# Patient Record
Sex: FEMALE | Race: WHITE | Employment: OTHER | URBAN - METROPOLITAN AREA
[De-identification: names, ages, dates, MRNs, and addresses within clinical notes are randomized per-mention and may not be internally consistent; named-entity substitution may affect disease eponyms.]

---

## 2019-02-12 ENCOUNTER — OFFICE VISIT (OUTPATIENT)
Dept: PRIMARY CARE CLINIC | Age: 49
End: 2019-02-12
Payer: OTHER GOVERNMENT

## 2019-02-12 VITALS
BODY MASS INDEX: 44.96 KG/M2 | WEIGHT: 223 LBS | TEMPERATURE: 97.1 F | OXYGEN SATURATION: 98 % | DIASTOLIC BLOOD PRESSURE: 60 MMHG | HEIGHT: 59 IN | HEART RATE: 68 BPM | SYSTOLIC BLOOD PRESSURE: 102 MMHG

## 2019-02-12 DIAGNOSIS — F41.9 ANXIETY: ICD-10-CM

## 2019-02-12 DIAGNOSIS — E66.01 CLASS 3 SEVERE OBESITY DUE TO EXCESS CALORIES WITH SERIOUS COMORBIDITY AND BODY MASS INDEX (BMI) OF 45.0 TO 49.9 IN ADULT (HCC): ICD-10-CM

## 2019-02-12 DIAGNOSIS — Z79.4 TYPE 2 DIABETES MELLITUS WITHOUT COMPLICATION, WITH LONG-TERM CURRENT USE OF INSULIN (HCC): ICD-10-CM

## 2019-02-12 DIAGNOSIS — F33.40 RECURRENT MAJOR DEPRESSIVE DISORDER, IN REMISSION (HCC): ICD-10-CM

## 2019-02-12 DIAGNOSIS — I10 ESSENTIAL HYPERTENSION: Primary | ICD-10-CM

## 2019-02-12 DIAGNOSIS — M51.26 HERNIATED LUMBAR INTERVERTEBRAL DISC: ICD-10-CM

## 2019-02-12 DIAGNOSIS — G60.9 IDIOPATHIC PERIPHERAL NEUROPATHY: ICD-10-CM

## 2019-02-12 DIAGNOSIS — E11.9 TYPE 2 DIABETES MELLITUS WITHOUT COMPLICATION, WITH LONG-TERM CURRENT USE OF INSULIN (HCC): ICD-10-CM

## 2019-02-12 DIAGNOSIS — G47.09 OTHER INSOMNIA: ICD-10-CM

## 2019-02-12 DIAGNOSIS — Z13.220 SCREENING CHOLESTEROL LEVEL: ICD-10-CM

## 2019-02-12 PROBLEM — E66.813 CLASS 3 SEVERE OBESITY DUE TO EXCESS CALORIES WITH SERIOUS COMORBIDITY AND BODY MASS INDEX (BMI) OF 45.0 TO 49.9 IN ADULT: Status: ACTIVE | Noted: 2019-02-12

## 2019-02-12 PROCEDURE — 99203 OFFICE O/P NEW LOW 30 MIN: CPT | Performed by: INTERNAL MEDICINE

## 2019-02-12 RX ORDER — GLIMEPIRIDE 4 MG/1
4 TABLET ORAL 2 TIMES DAILY
COMMUNITY
End: 2019-09-06 | Stop reason: ALTCHOICE

## 2019-02-12 RX ORDER — ALPRAZOLAM 0.5 MG/1
0.5 TABLET ORAL NIGHTLY
COMMUNITY

## 2019-02-12 RX ORDER — DULOXETIN HYDROCHLORIDE 60 MG/1
120 CAPSULE, DELAYED RELEASE ORAL DAILY
COMMUNITY

## 2019-02-12 RX ORDER — LORATADINE 10 MG/1
10 CAPSULE, LIQUID FILLED ORAL DAILY
COMMUNITY

## 2019-02-12 RX ORDER — CETIRIZINE HYDROCHLORIDE 10 MG/1
10 TABLET ORAL DAILY
COMMUNITY
End: 2022-04-04 | Stop reason: SDUPTHER

## 2019-02-12 RX ORDER — LISINOPRIL 10 MG/1
10 TABLET ORAL DAILY
COMMUNITY
End: 2019-09-06 | Stop reason: SDUPTHER

## 2019-02-12 RX ORDER — DICYCLOMINE HCL 20 MG
20 TABLET ORAL PRN
COMMUNITY

## 2019-02-12 RX ORDER — PANTOPRAZOLE SODIUM 40 MG/1
40 TABLET, DELAYED RELEASE ORAL 2 TIMES DAILY
COMMUNITY
End: 2019-09-06 | Stop reason: SDUPTHER

## 2019-02-12 RX ORDER — ATORVASTATIN CALCIUM 10 MG/1
10 TABLET, FILM COATED ORAL DAILY
COMMUNITY
End: 2019-09-06 | Stop reason: SDUPTHER

## 2019-02-12 RX ORDER — INSULIN GLARGINE 100 [IU]/ML
56 INJECTION, SOLUTION SUBCUTANEOUS NIGHTLY
COMMUNITY
End: 2019-09-06

## 2019-02-12 RX ORDER — METOPROLOL TARTRATE 100 MG/1
100 TABLET ORAL DAILY
COMMUNITY
End: 2019-02-12 | Stop reason: SDUPTHER

## 2019-02-12 RX ORDER — METOPROLOL TARTRATE 100 MG/1
100 TABLET ORAL DAILY
Qty: 60 TABLET | Refills: 0 | Status: SHIPPED | OUTPATIENT
Start: 2019-02-12 | End: 2019-04-16 | Stop reason: ALTCHOICE

## 2019-02-12 RX ORDER — TEMAZEPAM 30 MG/1
15 CAPSULE ORAL NIGHTLY PRN
COMMUNITY
End: 2019-11-13

## 2019-02-12 RX ORDER — BUSPIRONE HYDROCHLORIDE 15 MG/1
30 TABLET ORAL 2 TIMES DAILY
COMMUNITY
End: 2022-05-05

## 2019-02-12 RX ORDER — PREGABALIN 100 MG/1
200 CAPSULE ORAL 2 TIMES DAILY
COMMUNITY

## 2019-02-12 RX ORDER — BREXPIPRAZOLE 1 MG/1
1 TABLET ORAL DAILY
COMMUNITY

## 2019-02-12 ASSESSMENT — PATIENT HEALTH QUESTIONNAIRE - PHQ9
SUM OF ALL RESPONSES TO PHQ9 QUESTIONS 1 & 2: 0
SUM OF ALL RESPONSES TO PHQ QUESTIONS 1-9: 0
SUM OF ALL RESPONSES TO PHQ QUESTIONS 1-9: 0
2. FEELING DOWN, DEPRESSED OR HOPELESS: 0
1. LITTLE INTEREST OR PLEASURE IN DOING THINGS: 0

## 2019-02-15 RX ORDER — METOPROLOL SUCCINATE 100 MG/1
100 TABLET, EXTENDED RELEASE ORAL DAILY
Qty: 90 TABLET | Refills: 1 | Status: SHIPPED | OUTPATIENT
Start: 2019-02-15 | End: 2019-09-06 | Stop reason: SDUPTHER

## 2019-03-21 RX ORDER — ONDANSETRON 4 MG/1
4 TABLET, FILM COATED ORAL EVERY 8 HOURS PRN
Qty: 15 TABLET | Refills: 0 | Status: SHIPPED | OUTPATIENT
Start: 2019-03-21 | End: 2019-04-16 | Stop reason: ALTCHOICE

## 2019-04-01 ENCOUNTER — TELEPHONE (OUTPATIENT)
Dept: PRIMARY CARE CLINIC | Age: 49
End: 2019-04-01

## 2019-04-01 DIAGNOSIS — Z79.4 TYPE 2 DIABETES MELLITUS WITHOUT COMPLICATION, WITH LONG-TERM CURRENT USE OF INSULIN (HCC): ICD-10-CM

## 2019-04-01 DIAGNOSIS — J45.909 ASTHMA, UNSPECIFIED ASTHMA SEVERITY, UNSPECIFIED WHETHER COMPLICATED, UNSPECIFIED WHETHER PERSISTENT: Primary | ICD-10-CM

## 2019-04-01 DIAGNOSIS — E11.9 TYPE 2 DIABETES MELLITUS WITHOUT COMPLICATION, WITH LONG-TERM CURRENT USE OF INSULIN (HCC): ICD-10-CM

## 2019-04-01 LAB
ALBUMIN SERPL-MCNC: 3.4 G/DL
ALP BLD-CCNC: 88 U/L
ALT SERPL-CCNC: 17 U/L
ANION GAP SERPL CALCULATED.3IONS-SCNC: ABNORMAL MMOL/L
AST SERPL-CCNC: 26 U/L
BASOPHILS ABSOLUTE: 0 /ΜL
BASOPHILS RELATIVE PERCENT: 0 %
BILIRUB SERPL-MCNC: 1.1 MG/DL (ref 0.1–1.4)
BUN BLDV-MCNC: 8 MG/DL
CALCIUM SERPL-MCNC: 8.6 MG/DL
CHLORIDE BLD-SCNC: 102 MMOL/L
CO2: 28 MMOL/L
CREAT SERPL-MCNC: 0.4 MG/DL
EOSINOPHILS ABSOLUTE: 0 /ΜL
EOSINOPHILS RELATIVE PERCENT: 0.8 %
GFR CALCULATED: 169
GLUCOSE BLD-MCNC: 138 MG/DL
HCT VFR BLD CALC: 34.1 % (ref 36–46)
HEMOGLOBIN: 11.5 G/DL (ref 12–16)
LYMPHOCYTES ABSOLUTE: 2.1 /ΜL
LYMPHOCYTES RELATIVE PERCENT: 34.5 %
MCH RBC QN AUTO: 27.3 PG
MCHC RBC AUTO-ENTMCNC: 33.6 G/DL
MCV RBC AUTO: 81.2 FL
MONOCYTES ABSOLUTE: 0.4 /ΜL
MONOCYTES RELATIVE PERCENT: 6.8 %
NEUTROPHILS ABSOLUTE: 3.5 /ΜL
NEUTROPHILS RELATIVE PERCENT: 57.9 %
PLATELET # BLD: 230 K/ΜL
PMV BLD AUTO: 7.2 FL
POTASSIUM SERPL-SCNC: 3.7 MMOL/L
RBC # BLD: 4.2 10^6/ΜL
SODIUM BLD-SCNC: 138 MMOL/L
TOTAL PROTEIN: 7.1
WBC # BLD: 6.1 10^3/ML

## 2019-04-01 RX ORDER — ALBUTEROL SULFATE 90 UG/1
2 AEROSOL, METERED RESPIRATORY (INHALATION) EVERY 6 HOURS PRN
Qty: 1 INHALER | Refills: 3 | Status: SHIPPED | OUTPATIENT
Start: 2019-04-01 | End: 2019-04-16 | Stop reason: ALTCHOICE

## 2019-04-01 NOTE — TELEPHONE ENCOUNTER
Patient was cleaning yesterday and says the dust triggered her asthma. Says her allergy pills are not helping. She is requesting a rescue inhaler be sent to [facility]

## 2019-04-09 ENCOUNTER — TELEPHONE (OUTPATIENT)
Dept: ADMINISTRATIVE | Age: 49
End: 2019-04-09

## 2019-04-09 NOTE — TELEPHONE ENCOUNTER
Pt just restarted Lyrica, and it is making her dizzy and drousy. She does not want to drive today, rescheduled appt for 4/16/19.

## 2019-04-16 ENCOUNTER — OFFICE VISIT (OUTPATIENT)
Dept: PRIMARY CARE CLINIC | Age: 49
End: 2019-04-16
Payer: OTHER GOVERNMENT

## 2019-04-16 VITALS
SYSTOLIC BLOOD PRESSURE: 138 MMHG | HEART RATE: 72 BPM | TEMPERATURE: 98 F | WEIGHT: 217 LBS | HEIGHT: 59 IN | OXYGEN SATURATION: 98 % | BODY MASS INDEX: 43.75 KG/M2 | DIASTOLIC BLOOD PRESSURE: 86 MMHG

## 2019-04-16 DIAGNOSIS — I10 ESSENTIAL HYPERTENSION: ICD-10-CM

## 2019-04-16 DIAGNOSIS — F41.9 ANXIETY: Primary | ICD-10-CM

## 2019-04-16 DIAGNOSIS — J45.909 ASTHMA, UNSPECIFIED ASTHMA SEVERITY, UNSPECIFIED WHETHER COMPLICATED, UNSPECIFIED WHETHER PERSISTENT: ICD-10-CM

## 2019-04-16 DIAGNOSIS — D64.9 NORMOCYTIC ANEMIA: ICD-10-CM

## 2019-04-16 PROCEDURE — 99214 OFFICE O/P EST MOD 30 MIN: CPT | Performed by: INTERNAL MEDICINE

## 2019-04-16 RX ORDER — CYANOCOBALAMIN 1000 UG/ML
1000 INJECTION INTRAMUSCULAR; SUBCUTANEOUS ONCE
COMMUNITY
End: 2022-05-05

## 2019-04-16 RX ORDER — HYDROXYZINE PAMOATE 25 MG/1
25 CAPSULE ORAL 3 TIMES DAILY PRN
Qty: 90 CAPSULE | Refills: 0 | Status: SHIPPED | OUTPATIENT
Start: 2019-04-16 | End: 2019-05-16

## 2019-04-16 NOTE — PATIENT INSTRUCTIONS
Patient Education        Advance Directives: Care Instructions  Your Care Instructions  An advance directive is a legal way to state your wishes at the end of your life. It tells your family and your doctor what to do if you can no longer say what you want. There are two main types of advance directives. You can change them any time that your wishes change. · A living will tells your family and your doctor your wishes about life support and other treatment. · A durable power of  for health care lets you name a person to make treatment decisions for you when you can't speak for yourself. This person is called a health care agent. If you do not have an advance directive, decisions about your medical care may be made by a doctor or a  who doesn't know you. It may help to think of an advance directive as a gift to the people who care for you. If you have one, they won't have to make tough decisions by themselves. Follow-up care is a key part of your treatment and safety. Be sure to make and go to all appointments, and call your doctor if you are having problems. It's also a good idea to know your test results and keep a list of the medicines you take. How can you care for yourself at home? · Discuss your wishes with your loved ones and your doctor. This way, there are no surprises. · Many states have a unique form. Or you might use a universal form that has been approved by many states. This kind of form can sometimes be completed and stored online. Your electronic copy will then be available wherever you have a connection to the Internet. In most cases, doctors will respect your wishes even if you have a form from a different state. · You don't need a  to do an advance directive. But you may want to get legal advice. · Think about these questions when you prepare an advance directive:  ? Who do you want to make decisions about your medical care if you are not able to?  Many people choose a family member or close friend. ? Do you know enough about life support methods that might be used? If not, talk to your doctor so you understand. ? What are you most afraid of that might happen? You might be afraid of having pain, losing your independence, or being kept alive by machines. ? Where would you prefer to die? Choices include your home, a hospital, or a nursing home. ? Would you like to have information about hospice care to support you and your family? ? Do you want to donate organs when you die? ? Do you want certain Tenriism practices performed before you die? If so, put your wishes in the advance directive. · Read your advance directive every year, and make changes as needed. When should you call for help? Be sure to contact your doctor if you have any questions. Where can you learn more? Go to [email or web address] and sign in to your [email or web address] account. Enter R264 in the Whatâ€™s More Alive Than You box to learn more about \"Advance Directives: Care Instructions. \"     If you do not have an account, please click on the \"Sign Up Now\" link. Current as of: April 18, 2018  Content Version: 11.9  © 6595-9798 [employer]. Care instructions adapted under license by [facility] ([facility]). If you have questions about a medical condition or this instruction, always ask your healthcare professional. [place] [address] any warranty or liability for your use of this information. Patient Education        Advance Directives: Care Instructions  Your Care Instructions  An advance directive is a legal way to state your wishes at the end of your life. It tells your family and your doctor what to do if you can no longer say what you want. There are two main types of advance directives. You can change them any time that your wishes change. · A living will tells your family and your doctor your wishes about life support and other treatment.   · A durable power of  for health care lets you name a person to make treatment decisions for you when you can't speak for yourself. This person is called a health care agent. If you do not have an advance directive, decisions about your medical care may be made by a doctor or a  who doesn't know you. It may help to think of an advance directive as a gift to the people who care for you. If you have one, they won't have to make tough decisions by themselves. Follow-up care is a key part of your treatment and safety. Be sure to make and go to all appointments, and call your doctor if you are having problems. It's also a good idea to know your test results and keep a list of the medicines you take. How can you care for yourself at home? · Discuss your wishes with your loved ones and your doctor. This way, there are no surprises. · Many states have a unique form. Or you might use a universal form that has been approved by many states. This kind of form can sometimes be completed and stored online. Your electronic copy will then be available wherever you have a connection to the Internet. In most cases, doctors will respect your wishes even if you have a form from a different state. · You don't need a  to do an advance directive. But you may want to get legal advice. · Think about these questions when you prepare an advance directive:  ? Who do you want to make decisions about your medical care if you are not able to? Many people choose a family member or close friend. ? Do you know enough about life support methods that might be used? If not, talk to your doctor so you understand. ? What are you most afraid of that might happen? You might be afraid of having pain, losing your independence, or being kept alive by machines. ? Where would you prefer to die? Choices include your home, a hospital, or a nursing home. ? Would you like to have information about hospice care to support you and your family?   ? Do you your treatment and safety. Be sure to make and go to all appointments, and call your doctor if you are having problems. It's also a good idea to know your test results and keep a list of the medicines you take. How can you care for yourself at home? · Discuss your wishes with your loved ones and your doctor. This way, there are no surprises. · Many states have a unique form. Or you might use a universal form that has been approved by many states. This kind of form can sometimes be completed and stored online. Your electronic copy will then be available wherever you have a connection to the Internet. In most cases, doctors will respect your wishes even if you have a form from a different state. · You don't need a  to do an advance directive. But you may want to get legal advice. · Think about these questions when you prepare an advance directive:  ? Who do you want to make decisions about your medical care if you are not able to? Many people choose a family member or close friend. ? Do you know enough about life support methods that might be used? If not, talk to your doctor so you understand. ? What are you most afraid of that might happen? You might be afraid of having pain, losing your independence, or being kept alive by machines. ? Where would you prefer to die? Choices include your home, a hospital, or a nursing home. ? Would you like to have information about hospice care to support you and your family? ? Do you want to donate organs when you die? ? Do you want certain Yazidi practices performed before you die? If so, put your wishes in the advance directive. · Read your advance directive every year, and make changes as needed. When should you call for help? Be sure to contact your doctor if you have any questions. Where can you learn more? Go to [email or web address] and sign in to your SplashCast account.  Enter R264 in the [employer] box to learn more about \"Advance Directives: Care Instructions. \"     If you do not have an account, please click on the \"Sign Up Now\" link. Current as of: April 18, 2018  Content Version: 11.9  © 0459-9907 [employer]. Care instructions adapted under license by [facility] ([facility]). If you have questions about a medical condition or this instruction, always ask your healthcare professional. [address] any warranty or liability for your use of this information.

## 2019-04-16 NOTE — PROGRESS NOTES
4/16/19    Gil Baum, a female of 46 y.o. came to the office     Gil Baum presents today for 8 week follow-up. She has a history of diabetes, peripheral neuropathy, anxiety, depression, or needed lumbar disc, asthma, autoimmune atrophic gastritis, insomnia and gastric tumor. She feels that her anxiety is getting worse. She does follow with psychiatry. She had some breathing problems - she initially thought that it was due to her asthma. She later thought that it was due to anxiety. She was admitted to [facility].  She takes cymbalta, rexulti, buspar and xanax as needed. She has not been taking xanax because it makes her sleepy because she has to take care of a young child. She would like to go to counseling but has been unable to meet with a provider. She saw her psychiatrist a few weeks ago - he started back on rexulti. She is following at [facility].  Her asthma is exacerbated by dust and other environmental exposures. She has a history of gastric cancer and follows with a surgeon at [facility]. She denies any GI bleeding or dark and tarry stools. She last had a colonoscopy 2 years ago.        Patient Active Problem List   Diagnosis    Essential hypertension    Type 2 diabetes mellitus without complication, with long-term current use of insulin ([facility])    Class 3 severe obesity due to excess calories with serious comorbidity and body mass index (BMI) of 45.0 to 49.9 in adult [facility])    Other insomnia    Anxiety    Recurrent major depressive disorder, in remission ([facility])    Idiopathic peripheral neuropathy    Herniated lumbar intervertebral disc      Allergies   Allergen Reactions    Neurontin [Gabapentin]     Theophyllines     Vicodin [Hydrocodone-Acetaminophen]     Wellbutrin [Bupropion]        Current Outpatient Medications on File Prior to Visit   Medication Sig Dispense Refill    cyanocobalamin 1000 MCG/ML injection Inject 1,000 mcg into the muscle once      Omega-3 Fatty Acids (OMEGA-3 PO) Take by mouth      metoprolol succinate (TOPROL XL) 100 MG extended release tablet Take 1 tablet by mouth daily 90 tablet 1    pantoprazole (PROTONIX) 40 MG tablet Take 40 mg by mouth 2 times daily      temazepam (RESTORIL) 30 MG capsule Take 30 mg by mouth nightly as needed for Sleep. .      glimepiride (AMARYL) 4 MG tablet Take 4 mg by mouth 2 times daily      lisinopril (PRINIVIL;ZESTRIL) 10 MG tablet Take 10 mg by mouth daily      loratadine (CLARITIN) 10 MG capsule Take 10 mg by mouth daily      cetirizine (ZYRTEC) 10 MG tablet Take 10 mg by mouth daily      atorvastatin (LIPITOR) 10 MG tablet Take 10 mg by mouth daily      pregabalin (LYRICA) 100 MG capsule Take 100 mg by mouth 3 times daily. Myra Monas insulin glargine (LANTUS) 100 UNIT/ML injection vial Inject 56 Units into the skin nightly      dicyclomine (BENTYL) 20 MG tablet Take 20 mg by mouth as needed (stomach pain)      ALPRAZolam (XANAX) 0.5 MG tablet Take 0.5 mg by mouth nightly as needed for Anxiety. Myra Monas busPIRone (BUSPAR) 15 MG tablet Take 30 mg by mouth 2 times daily      DULoxetine (CYMBALTA) 60 MG extended release capsule Take 120 mg by mouth daily      aspirin 81 MG tablet Take 81 mg by mouth daily      brexpiprazole (REXULTI) 0.5 MG TABS tablet Take 0.5 mg by mouth daily       No current facility-administered medications on file prior to visit. Review of Systems  Constitutional:Negative for activity change, appetite change, chills, fatigue and fever. Respiratory: Negative for choking, chest tightness, shortness of breath and wheezing. Cardiovascular: Negative for chest pain, palpitations and leg swelling. Gastrointestinal: Negative for abdominal distention, constipation, diarrhea, nausea and vomiting. Musculoskeletal: Negative for arthralgias, back pain, gait problem and joint swelling. Neurological: Negative for dizziness, weakness,numbness and headaches.      OBJECTIVE:  /86   Pulse 72 Temp 98 °F (36.7 °C)   Ht 4' 11\" (1.499 m)   Wt 217 lb (98.4 kg)   SpO2 98%   BMI 43.83 kg/m²      Physical Exam   Constitutional:  oriented to person, place, and time. appears well-developed and well-nourished. No distress. HENT: No sinustenderness or lymphadenopathy  Head: Normocephalic and atraumatic. Eyes: Left eye exhibits no discharge. No scleral icterus. Neck: No tracheal deviation present. No thyromegalypresent. Cardiovascular: Normal rate, regular rhythm, normal heart sounds and intact distal pulses. Exam reveals no gallop and no friction rub. No murmur heard. Pulmonary/Chest: Effort normal and breath sounds normal. No respiratory distress. She has no wheezes. no rales. no tenderness. Musculoskeletal:  no tenderness. Neurological:alert and oriented to person, place, and time. Skin: No diaphoresis. Psychiatric: Normal mood and affect. Behavior isnormal.     ASSESSMENT AND PLAN:    Dimple was seen today for diabetes, hypertension and follow-up. Diagnoses and all orders for this visit:    Anxiety  -     hydrOXYzine (VISTARIL) 25 MG capsule; Take 1 capsule by mouth 3 times daily as needed for Anxiety  -     [address], [place], PhD, Psychology,  [place] [address]    Asthma, unspecified asthma severity, unspecified whether complicated, unspecified whether persistent    Essential hypertension    Normocytic anemia        Saúl Agee presents today for two-month follow-up. Today she states that she has had multiple episodes of panic attacks. She has an extensive psychiatric history consisting of both anxiety and depression. She does follow up with a psychiatrist. She most recently saw them 3 weeks ago. Despite their changes, she continues to have acute symptoms. 2 weeks ago she was hospitalized for what she suspected was asthma. She has come to the realization that it was likely her anxiety. Today in addition to her current medications I will add on Vistaril.  At her request, I will also refer her to a psychologist for further management. We did discuss her asthma today and both her lung fields are clear. I will defer any changes to her inhaler therapy. Her blood pressure is well-controlled. We also reviewed her blood work which shows normocytic anemia. She states that previously she was diagnosed with an autoimmune gastritis and subsequent vitamin B12 deficiency. She does receive B12 injections from her neurologist. I will continue to monitor this phenomenon with blood work    Please note that the above documentation was prepared using voice recognition software. Every attempt was made to ensure accuracy but there may be spelling, grammatical, and contextual errors. Return in about 6 weeks (around 5/28/2019).     Good Crowe, DO

## 2019-04-29 ENCOUNTER — TELEPHONE (OUTPATIENT)
Dept: PRIMARY CARE CLINIC | Age: 49
End: 2019-04-29

## 2019-04-29 DIAGNOSIS — F41.9 ANXIETY: Primary | ICD-10-CM

## 2019-04-29 RX ORDER — HYDROXYZINE HYDROCHLORIDE 25 MG/1
25 TABLET, FILM COATED ORAL EVERY 6 HOURS PRN
Qty: 90 TABLET | Refills: 0 | Status: SHIPPED
Start: 2019-04-29 | End: 2020-02-17

## 2019-04-29 NOTE — TELEPHONE ENCOUNTER
Dimple has tried to  Get the vistaril 25 mg filled at a bunch of different pharmacies they are all on back order.  Do you want to call something else in

## 2019-09-06 ENCOUNTER — OFFICE VISIT (OUTPATIENT)
Dept: PRIMARY CARE CLINIC | Age: 49
End: 2019-09-06
Payer: OTHER GOVERNMENT

## 2019-09-06 VITALS
HEIGHT: 60 IN | TEMPERATURE: 98 F | HEART RATE: 87 BPM | OXYGEN SATURATION: 98 % | BODY MASS INDEX: 43.78 KG/M2 | DIASTOLIC BLOOD PRESSURE: 78 MMHG | WEIGHT: 223 LBS | SYSTOLIC BLOOD PRESSURE: 120 MMHG

## 2019-09-06 DIAGNOSIS — M54.31 BILATERAL SCIATICA: Primary | ICD-10-CM

## 2019-09-06 DIAGNOSIS — M54.32 BILATERAL SCIATICA: Primary | ICD-10-CM

## 2019-09-06 DIAGNOSIS — F33.40 RECURRENT MAJOR DEPRESSIVE DISORDER, IN REMISSION (HCC): ICD-10-CM

## 2019-09-06 DIAGNOSIS — F41.9 ANXIETY: ICD-10-CM

## 2019-09-06 DIAGNOSIS — K29.70 GASTRITIS WITHOUT BLEEDING, UNSPECIFIED CHRONICITY, UNSPECIFIED GASTRITIS TYPE: ICD-10-CM

## 2019-09-06 DIAGNOSIS — I10 ESSENTIAL HYPERTENSION: ICD-10-CM

## 2019-09-06 PROCEDURE — 99214 OFFICE O/P EST MOD 30 MIN: CPT | Performed by: INTERNAL MEDICINE

## 2019-09-06 PROCEDURE — 96372 THER/PROPH/DIAG INJ SC/IM: CPT | Performed by: INTERNAL MEDICINE

## 2019-09-06 RX ORDER — ATORVASTATIN CALCIUM 10 MG/1
10 TABLET, FILM COATED ORAL DAILY
Qty: 90 TABLET | Refills: 1 | Status: SHIPPED
Start: 2019-09-06 | End: 2020-04-29

## 2019-09-06 RX ORDER — METOPROLOL SUCCINATE 100 MG/1
100 TABLET, EXTENDED RELEASE ORAL DAILY
Qty: 90 TABLET | Refills: 1 | Status: SHIPPED
Start: 2019-09-06 | End: 2020-04-29

## 2019-09-06 RX ORDER — PANTOPRAZOLE SODIUM 40 MG/1
40 TABLET, DELAYED RELEASE ORAL 2 TIMES DAILY
Qty: 30 TABLET | Refills: 1 | Status: SHIPPED
Start: 2019-09-06 | End: 2022-04-04 | Stop reason: SDUPTHER

## 2019-09-06 RX ORDER — KETOROLAC TROMETHAMINE 30 MG/ML
30 INJECTION, SOLUTION INTRAMUSCULAR; INTRAVENOUS ONCE
Status: COMPLETED | OUTPATIENT
Start: 2019-09-06 | End: 2019-09-06

## 2019-09-06 RX ORDER — LISINOPRIL 10 MG/1
10 TABLET ORAL DAILY
Qty: 30 TABLET | Refills: 3 | Status: SHIPPED | OUTPATIENT
Start: 2019-09-06 | End: 2019-09-09 | Stop reason: SDUPTHER

## 2019-09-06 RX ORDER — NAPROXEN 500 MG/1
500 TABLET ORAL 2 TIMES DAILY WITH MEALS
Qty: 14 TABLET | Refills: 1 | Status: SHIPPED
Start: 2019-09-06 | End: 2020-03-04 | Stop reason: ALTCHOICE

## 2019-09-06 RX ADMIN — KETOROLAC TROMETHAMINE 30 MG: 30 INJECTION, SOLUTION INTRAMUSCULAR; INTRAVENOUS at 12:00

## 2019-09-06 NOTE — PROGRESS NOTES
Neurological:alert and oriented to person, place, and time. Skin: No diaphoresis. Psychiatric: Normal mood and affect. Behavior isnormal.     ASSESSMENT AND PLAN:    Dimple was seen today for lower back pain and depression. Diagnoses and all orders for this visit:    Bilateral sciatica  -     naproxen (NAPROSYN) 500 MG tablet; Take 1 tablet by mouth 2 times daily (with meals) for 14 doses  -     ketorolac (TORADOL) injection 30 mg  -     MRI LUMBAR SPINE WO CONTRAST; Future  -     Cancel: External Referral To Pain [address], Pain Medicine, [place]    Anxiety    Essential hypertension    Recurrent major depressive disorder, in remission ([facility] [address]    Gastritis without bleeding, unspecified chronicity, unspecified gastritis type    Other orders  -     metoprolol succinate (TOPROL XL) 100 MG extended release tablet; Take 1 tablet by mouth daily  -     atorvastatin (LIPITOR) 10 MG tablet; Take 1 tablet by mouth daily  -     pantoprazole (PROTONIX) 40 MG tablet; Take 1 tablet by mouth 2 times daily  -     lisinopril (PRINIVIL;ZESTRIL) 10 MG tablet; Take 1 tablet by mouth daily        Alexa Valencia presents today for routine follow-up and evaluation of her back pain. Today I will obtain an MRI. I will also give her a short course of anti-inflammatories along with a short of steroid Toradol. This is not ideal considering her history of gastritis but I am limited in therapeutic capacity given her history of diabetes as well. I will also refer her to pain management for further evaluation and more permanent treatment. Her other chronic issues are stable with the exception of her anxiety. I do feel that this is worsened given her pain symptoms.   She does follow-up with psychiatrist regularly but may benefit from titration of medication if treatment of her pain fails to improve her depression and anxiety    Please note that the above documentation was prepared using voice recognition

## 2019-09-09 RX ORDER — LISINOPRIL 10 MG/1
10 TABLET ORAL DAILY
Qty: 90 TABLET | Refills: 1 | Status: SHIPPED
Start: 2019-09-09 | End: 2022-04-05 | Stop reason: SDUPTHER

## 2019-09-12 ENCOUNTER — HOSPITAL ENCOUNTER (OUTPATIENT)
Dept: MRI IMAGING | Age: 49
Discharge: HOME | End: 2019-09-14
Payer: OTHER GOVERNMENT

## 2019-09-12 DIAGNOSIS — M54.32 BILATERAL SCIATICA: ICD-10-CM

## 2019-09-12 DIAGNOSIS — M54.31 BILATERAL SCIATICA: ICD-10-CM

## 2019-09-12 PROCEDURE — 72148 MRI LUMBAR SPINE W/O DYE: CPT

## 2019-09-16 ENCOUNTER — PREP FOR PROCEDURE (OUTPATIENT)
Dept: PAIN MANAGEMENT | Age: 49
End: 2019-09-16

## 2019-09-16 ENCOUNTER — OFFICE VISIT (OUTPATIENT)
Dept: PAIN MANAGEMENT | Age: 49
End: 2019-09-16
Payer: OTHER GOVERNMENT

## 2019-09-16 VITALS
HEART RATE: 77 BPM | TEMPERATURE: 98.1 F | SYSTOLIC BLOOD PRESSURE: 110 MMHG | HEIGHT: 60 IN | RESPIRATION RATE: 18 BRPM | BODY MASS INDEX: 43.39 KG/M2 | OXYGEN SATURATION: 97 % | DIASTOLIC BLOOD PRESSURE: 60 MMHG | WEIGHT: 221 LBS

## 2019-09-16 DIAGNOSIS — M51.9 LUMBAR DISC DISORDER: ICD-10-CM

## 2019-09-16 DIAGNOSIS — G89.4 CHRONIC PAIN SYNDROME: ICD-10-CM

## 2019-09-16 DIAGNOSIS — M16.12 PRIMARY OSTEOARTHRITIS OF LEFT HIP: ICD-10-CM

## 2019-09-16 DIAGNOSIS — M47.816 LUMBAR SPONDYLOSIS: ICD-10-CM

## 2019-09-16 DIAGNOSIS — M48.061 SPINAL STENOSIS OF LUMBAR REGION WITHOUT NEUROGENIC CLAUDICATION: ICD-10-CM

## 2019-09-16 DIAGNOSIS — M54.16 LUMBAR RADICULOPATHY: Primary | ICD-10-CM

## 2019-09-16 DIAGNOSIS — M51.26 HERNIATED LUMBAR INTERVERTEBRAL DISC: Primary | ICD-10-CM

## 2019-09-16 PROCEDURE — 99204 OFFICE O/P NEW MOD 45 MIN: CPT | Performed by: PAIN MEDICINE

## 2019-09-16 NOTE — PROGRESS NOTES
Dina Kinsey presents to the [address] [place] [address] on 9/16/2019. Dimple is complaining of pain in her low back and into her buttocks and down her legs. The pain is constant. The pain is described as aching, shooting, dull, sharp, burning, numb and unbearable. Pain is rated on her best day at a 3, on her worst day at a 10, and on average at a 5 on the VAS scale. She took her last dose of Lyrica for the neuropathy and Naproxen this morning for back pain. Any procedures since your last visit: No, with  % relief. She has been on anticoagulation medications to include ASA and is managed by PCP. Pacemaker or defibrilator: No Physician managing device is . /60   Pulse 77   Temp 98.1 °F (36.7 °C)   Resp 18   Ht 5' (1.524 m)   Wt 221 lb (100.2 kg)   LMP 01/16/2019   SpO2 97%   BMI 43.16 kg/m²      Patient's last menstrual period was 01/16/2019.
benzodiazepines, alcohol, illicit drugs or sleep aids increases the risk of respiratory depression including death. We discussed that these medications may cause drowsiness, sedation or dizziness and have counseled the patient not to drive or operate machinery. We have discussed that these medications will be prescribed only by one provider. We have discussed with the patient about age related risk factors and have thoroughly discussed the importance of taking these medications as prescribed. The patient verbalizes understanding. nikki Reina M.D.

## 2019-09-18 ENCOUNTER — APPOINTMENT (OUTPATIENT)
Dept: GENERAL RADIOLOGY | Age: 49
End: 2019-09-18
Payer: OTHER GOVERNMENT

## 2019-09-18 ENCOUNTER — HOSPITAL ENCOUNTER (EMERGENCY)
Age: 49
Discharge: HOME | End: 2019-09-18
Attending: EMERGENCY MEDICINE
Payer: OTHER GOVERNMENT

## 2019-09-18 ENCOUNTER — TELEPHONE (OUTPATIENT)
Dept: PAIN MANAGEMENT | Age: 49
End: 2019-09-18

## 2019-09-18 ENCOUNTER — APPOINTMENT (OUTPATIENT)
Dept: CT IMAGING | Age: 49
End: 2019-09-18
Payer: OTHER GOVERNMENT

## 2019-09-18 VITALS
HEIGHT: 60 IN | HEART RATE: 93 BPM | TEMPERATURE: 97.8 F | SYSTOLIC BLOOD PRESSURE: 125 MMHG | OXYGEN SATURATION: 92 % | BODY MASS INDEX: 42.21 KG/M2 | RESPIRATION RATE: 17 BRPM | WEIGHT: 215 LBS | DIASTOLIC BLOOD PRESSURE: 58 MMHG

## 2019-09-18 DIAGNOSIS — R06.00 DYSPNEA, UNSPECIFIED TYPE: Primary | ICD-10-CM

## 2019-09-18 LAB
ALBUMIN SERPL-MCNC: 3.7 G/DL (ref 3.5–5.2)
ALP BLD-CCNC: 133 U/L (ref 35–104)
ALT SERPL-CCNC: 16 U/L (ref 0–32)
ANION GAP SERPL CALCULATED.3IONS-SCNC: 11 MMOL/L (ref 7–16)
AST SERPL-CCNC: 18 U/L (ref 0–31)
BILIRUB SERPL-MCNC: 0.6 MG/DL (ref 0–1.2)
BILIRUBIN DIRECT: <0.2 MG/DL (ref 0–0.3)
BILIRUBIN, INDIRECT: ABNORMAL MG/DL (ref 0–1)
BUN BLDV-MCNC: 8 MG/DL (ref 6–20)
CALCIUM SERPL-MCNC: 8.8 MG/DL (ref 8.6–10.2)
CHLORIDE BLD-SCNC: 101 MMOL/L (ref 98–107)
CO2: 26 MMOL/L (ref 22–29)
CREAT SERPL-MCNC: 0.5 MG/DL (ref 0.5–1)
D DIMER: 368 NG/ML DDU
EKG ATRIAL RATE: 75 BPM
EKG P AXIS: 56 DEGREES
EKG P-R INTERVAL: 166 MS
EKG Q-T INTERVAL: 408 MS
EKG QRS DURATION: 80 MS
EKG QTC CALCULATION (BAZETT): 455 MS
EKG R AXIS: 17 DEGREES
EKG T AXIS: 35 DEGREES
EKG VENTRICULAR RATE: 75 BPM
GFR AFRICAN AMERICAN: >60
GFR NON-AFRICAN AMERICAN: >60 ML/MIN/1.73
GLUCOSE BLD-MCNC: 391 MG/DL (ref 74–99)
LIPASE: 22 U/L (ref 13–60)
POTASSIUM REFLEX MAGNESIUM: 4 MMOL/L (ref 3.5–5)
PRO-BNP: 310 PG/ML (ref 0–125)
SODIUM BLD-SCNC: 138 MMOL/L (ref 132–146)
TOTAL PROTEIN: 6.8 G/DL (ref 6.4–8.3)
TROPONIN: <0.01 NG/ML (ref 0–0.03)

## 2019-09-18 PROCEDURE — 94664 DEMO&/EVAL PT USE INHALER: CPT

## 2019-09-18 PROCEDURE — 71275 CT ANGIOGRAPHY CHEST: CPT

## 2019-09-18 PROCEDURE — 83690 ASSAY OF LIPASE: CPT

## 2019-09-18 PROCEDURE — 83880 ASSAY OF NATRIURETIC PEPTIDE: CPT

## 2019-09-18 PROCEDURE — 93010 ELECTROCARDIOGRAM REPORT: CPT | Performed by: INTERNAL MEDICINE

## 2019-09-18 PROCEDURE — 85378 FIBRIN DEGRADE SEMIQUANT: CPT

## 2019-09-18 PROCEDURE — 71045 X-RAY EXAM CHEST 1 VIEW: CPT

## 2019-09-18 PROCEDURE — 96375 TX/PRO/DX INJ NEW DRUG ADDON: CPT

## 2019-09-18 PROCEDURE — 99285 EMERGENCY DEPT VISIT HI MDM: CPT

## 2019-09-18 PROCEDURE — 80048 BASIC METABOLIC PNL TOTAL CA: CPT

## 2019-09-18 PROCEDURE — 6360000002 HC RX W HCPCS: Performed by: STUDENT IN AN ORGANIZED HEALTH CARE EDUCATION/TRAINING PROGRAM

## 2019-09-18 PROCEDURE — 80076 HEPATIC FUNCTION PANEL: CPT

## 2019-09-18 PROCEDURE — 2580000003 HC RX 258: Performed by: RADIOLOGY

## 2019-09-18 PROCEDURE — 73030 X-RAY EXAM OF SHOULDER: CPT

## 2019-09-18 PROCEDURE — 96374 THER/PROPH/DIAG INJ IV PUSH: CPT

## 2019-09-18 PROCEDURE — 94640 AIRWAY INHALATION TREATMENT: CPT

## 2019-09-18 PROCEDURE — 6370000000 HC RX 637 (ALT 250 FOR IP): Performed by: EMERGENCY MEDICINE

## 2019-09-18 PROCEDURE — 84484 ASSAY OF TROPONIN QUANT: CPT

## 2019-09-18 PROCEDURE — 93005 ELECTROCARDIOGRAM TRACING: CPT | Performed by: STUDENT IN AN ORGANIZED HEALTH CARE EDUCATION/TRAINING PROGRAM

## 2019-09-18 PROCEDURE — 6360000004 HC RX CONTRAST MEDICATION: Performed by: RADIOLOGY

## 2019-09-18 PROCEDURE — 36415 COLL VENOUS BLD VENIPUNCTURE: CPT

## 2019-09-18 RX ORDER — SODIUM CHLORIDE 0.9 % (FLUSH) 0.9 %
10 SYRINGE (ML) INJECTION PRN
Status: COMPLETED | OUTPATIENT
Start: 2019-09-18 | End: 2019-09-18

## 2019-09-18 RX ORDER — FENTANYL CITRATE 50 UG/ML
50 INJECTION, SOLUTION INTRAMUSCULAR; INTRAVENOUS ONCE
Status: COMPLETED | OUTPATIENT
Start: 2019-09-18 | End: 2019-09-18

## 2019-09-18 RX ORDER — KETOROLAC TROMETHAMINE 30 MG/ML
15 INJECTION, SOLUTION INTRAMUSCULAR; INTRAVENOUS ONCE
Status: COMPLETED | OUTPATIENT
Start: 2019-09-18 | End: 2019-09-18

## 2019-09-18 RX ORDER — IPRATROPIUM BROMIDE AND ALBUTEROL SULFATE 2.5; .5 MG/3ML; MG/3ML
1 SOLUTION RESPIRATORY (INHALATION)
Status: COMPLETED | OUTPATIENT
Start: 2019-09-18 | End: 2019-09-18

## 2019-09-18 RX ORDER — ACETAMINOPHEN 325 MG/1
650 TABLET ORAL EVERY 6 HOURS PRN
COMMUNITY
End: 2020-03-04 | Stop reason: ALTCHOICE

## 2019-09-18 RX ADMIN — IPRATROPIUM BROMIDE AND ALBUTEROL SULFATE 1 AMPULE: .5; 3 SOLUTION RESPIRATORY (INHALATION) at 14:51

## 2019-09-18 RX ADMIN — KETOROLAC TROMETHAMINE 15 MG: 30 INJECTION, SOLUTION INTRAMUSCULAR; INTRAVENOUS at 12:48

## 2019-09-18 RX ADMIN — Medication 10 ML: at 15:57

## 2019-09-18 RX ADMIN — FENTANYL CITRATE 50 MCG: 50 INJECTION INTRAMUSCULAR; INTRAVENOUS at 14:43

## 2019-09-18 RX ADMIN — IPRATROPIUM BROMIDE AND ALBUTEROL SULFATE 1 AMPULE: .5; 3 SOLUTION RESPIRATORY (INHALATION) at 14:55

## 2019-09-18 RX ADMIN — IOPAMIDOL 80 ML: 755 INJECTION, SOLUTION INTRAVENOUS at 16:00

## 2019-09-18 RX ADMIN — IPRATROPIUM BROMIDE AND ALBUTEROL SULFATE 1 AMPULE: .5; 3 SOLUTION RESPIRATORY (INHALATION) at 14:50

## 2019-09-18 ASSESSMENT — ENCOUNTER SYMPTOMS
GASTROINTESTINAL NEGATIVE: 1
SORE THROAT: 0
CHEST TIGHTNESS: 1
WHEEZING: 0
EYES NEGATIVE: 1
STRIDOR: 1
SHORTNESS OF BREATH: 1
COUGH: 0
HEMOPTYSIS: 0

## 2019-09-18 ASSESSMENT — PAIN SCALES - GENERAL
PAINLEVEL_OUTOF10: 8
PAINLEVEL_OUTOF10: 8
PAINLEVEL_OUTOF10: 10

## 2019-09-18 NOTE — ED PROVIDER NOTES
Normal range of motion. Neck supple. Cardiovascular: Normal rate, regular rhythm and normal heart sounds. No murmurs rubs gallops appreciated. Chest pain is reproducible with palpation. Pulmonary/Chest: Effort normal and breath sounds normal.   No rales rhonchi's or wheezes appreciated. No rashes. Abdominal: Soft. Bowel sounds are normal.   Musculoskeletal: Normal range of motion. Neurological: She is alert and oriented to person, place, and time. Skin: Skin is warm and dry. Procedures         --------------------------------------------- PAST HISTORY ---------------------------------------------  Past Medical History:  has a past medical history of Asthma, [facility] mellitus[facility] GERD (gastroesophageal reflux disease), Hypertension, IBS (irritable bowel syndrome), OCD (obsessive compulsive disorder), Sleep apnea, and Stomach cancer ([facility] Past Surgical History:  has a past surgical history that includes Upper gastrointestinal endoscopy and Colonoscopy. Social History:  reports that she has never smoked. She has never used smokeless tobacco. She reports that she does not drink alcohol or use drugs. Family History: family history includes Cancer in an other family member; Depression in an other family member; Diabetes in an other family member; Heart Disease in an other family member; Mental Illness in an other family member; Stroke in an other family member. The patients home medications have been reviewed. Allergies: Neurontin [gabapentin];  Theophyllines; Vicodin [hydrocodone-acetaminophen]; and Wellbutrin [bupropion]    -------------------------------------------------- RESULTS -------------------------------------------------    Lab  Results for orders placed or performed during the hospital encounter of 84/77/63   Basic Metabolic Panel w/ Reflex to MG   Result Value Ref Range    Sodium 138 132 - 146 mmol/L    Potassium reflex Magnesium 4.0 3.5 - 5.0 mmol/L    Chloride family member; Depression in an other family member; Diabetes in an other family member; Heart Disease in an other family member; Mental Illness in an other family member; Stroke in an other family member. The patients home medications have been reviewed. Allergies: Neurontin [gabapentin];  Theophyllines; Vicodin [hydrocodone-acetaminophen]; and Wellbutrin [bupropion]    -------------------------------------------------- RESULTS -------------------------------------------------  Labs:  Results for orders placed or performed during the hospital encounter of 32/77/16   Basic Metabolic Panel w/ Reflex to MG   Result Value Ref Range    Sodium 138 132 - 146 mmol/L    Potassium reflex Magnesium 4.0 3.5 - 5.0 mmol/L    Chloride 101 98 - 107 mmol/L    CO2 26 22 - 29 mmol/L    Anion Gap 11 7 - 16 mmol/L    Glucose 391 (H) 74 - 99 mg/dL    BUN 8 6 - 20 mg/dL    CREATININE 0.5 0.5 - 1.0 mg/dL    GFR Non-African American >60 >=60 mL/min/1.73    GFR African American >60     Calcium 8.8 8.6 - 10.2 mg/dL   Brain Natriuretic Peptide   Result Value Ref Range    Pro- (H) 0 - 125 pg/mL   Troponin   Result Value Ref Range    Troponin <0.01 0.00 - 0.03 ng/mL   D-Dimer, Quantitative   Result Value Ref Range    D-Dimer, Quant 368 ng/mL DDU   Hepatic Function Panel   Result Value Ref Range    Total Protein 6.8 6.4 - 8.3 g/dL    Alb 3.7 3.5 - 5.2 g/dL    Alkaline Phosphatase 133 (H) 35 - 104 U/L    ALT 16 0 - 32 U/L    AST 18 0 - 31 U/L    Total Bilirubin 0.6 0.0 - 1.2 mg/dL    Bilirubin, Direct <0.2 0.0 - 0.3 mg/dL    Bilirubin, Indirect see below 0.0 - 1.0 mg/dL   Lipase   Result Value Ref Range    Lipase 22 13 - 60 U/L   EKG 12 Lead   Result Value Ref Range    Ventricular Rate 75 BPM    Atrial Rate 75 BPM    P-R Interval 166 ms    QRS Duration 80 ms    Q-T Interval 408 ms    QTc Calculation (Bazett) 455 ms    P Axis 56 degrees    R Axis 17 degrees    T Axis 35 degrees       Radiology:  CTA CHEST W CONTRAST   Final Result Diagnosis:  1. Dyspnea, unspecified type        Disposition:  Patient's disposition: Discharge to home  Patient's condition is stable.        Gracie Esparza MD  Resident  09/18/19 [id number]

## 2019-09-20 ENCOUNTER — OFFICE VISIT (OUTPATIENT)
Dept: PRIMARY CARE CLINIC | Age: 49
End: 2019-09-20
Payer: OTHER GOVERNMENT

## 2019-09-20 VITALS
SYSTOLIC BLOOD PRESSURE: 98 MMHG | OXYGEN SATURATION: 96 % | BODY MASS INDEX: 46.43 KG/M2 | HEIGHT: 60 IN | TEMPERATURE: 97.8 F | HEART RATE: 74 BPM | WEIGHT: 236.5 LBS | DIASTOLIC BLOOD PRESSURE: 64 MMHG

## 2019-09-20 DIAGNOSIS — I10 ESSENTIAL HYPERTENSION: ICD-10-CM

## 2019-09-20 DIAGNOSIS — F41.9 ANXIETY: Primary | ICD-10-CM

## 2019-09-20 DIAGNOSIS — K76.0 FATTY LIVER DISEASE, NONALCOHOLIC: ICD-10-CM

## 2019-09-20 DIAGNOSIS — R06.09 DYSPNEA ON EXERTION: ICD-10-CM

## 2019-09-20 PROCEDURE — 99213 OFFICE O/P EST LOW 20 MIN: CPT | Performed by: INTERNAL MEDICINE

## 2019-09-20 NOTE — PROGRESS NOTES
9/20/19    Agustin Stafford, a female of 44 y.o. came to the office     Agustin Stafford presents today for ER follow up. She has chest pressure and shortness of breath. She was told that she has an enlarged heart, spleen and liver. Her chest issues has gotten better. She has been taking her anxiety medication for the past 2 days. This has been helping her breathing. She also has been using her CPAP that has been helping. She has been to pain management--- they recommended epidural injections. They discussed the possibility of doing surgery if the injections don't help.      Patient Active Problem List   Diagnosis    Essential hypertension    Type 2 diabetes mellitus without complication, with long-term current use of insulin ([facility]    Class 3 severe obesity due to excess calories with serious comorbidity and body mass index (BMI) of 45.0 to 49.9 in [facility])    Other insomnia    Anxiety    Recurrent major depressive disorder, in remission ([facility]    Idiopathic peripheral neuropathy    Herniated lumbar intervertebral disc    Lumbar spondylosis    Lumbar disc disorder    Lumbar radiculopathy    Chronic pain syndrome    Spinal stenosis of lumbar region without neurogenic claudication    Primary osteoarthritis of left hip      Allergies   Allergen Reactions    Neurontin [Gabapentin]     Theophyllines     Vicodin [Hydrocodone-Acetaminophen]     Wellbutrin [Bupropion]      Current Outpatient Medications on File Prior to Visit   Medication Sig Dispense Refill    acetaminophen (TYLENOL) 325 MG tablet Take 650 mg by mouth every 6 hours as needed for Pain      lisinopril (PRINIVIL;ZESTRIL) 10 MG tablet Take 1 tablet by mouth daily 90 tablet 1    insulin regular human (HUMULIN R U-500 KWIKPEN) 500 UNIT/ML SOPN concentrated injection pen Inject into the skin      metoprolol succinate (TOPROL XL) 100 MG extended release tablet Take 1 tablet by mouth daily 90 tablet 1    atorvastatin (LIPITOR) 10 MG tablet and well-nourished. No distress. HENT: No sinustenderness or lymphadenopathy  Head: Normocephalic and atraumatic. Eyes: Left eye exhibits no discharge. No scleral icterus. Neck: No tracheal deviation present. No thyromegalypresent. Cardiovascular: Normal rate, regular rhythm, normal heart sounds and intact distal pulses. Exam reveals no gallop and no friction rub. No murmur heard./Chest: Effort normal and breath sounds normal. No respiratory distress. She has no wheezes. no rales. no tenderness. Musculoskeletal:  no tenderness. Neurological:alert and oriented to person, place, and time. Skin: No diaphoresis. Psychiatric: Normal mood and affect. Behavior isnormal.     ASSESSMENT AND PLAN:    Dimple was seen today for follow-up from hospital.    Diagnoses and all orders for this visit:    Anxiety    Essential hypertension    Dyspnea on exertion  -     ECHO Complete 2D W Doppler W Color; Future    Fatty liver disease, nonalcoholic  -     US LIVER; Future        Deana Kidd presents today for ER follow up    Echocardiogram to evaluate for cardiomegaly, swelling and dyspnea on exertion   CTA of the chest revealed cirrhosis  Will obtain US to characterize the liver better  Hx of alcohol use and fatty liver   Following with pain management   Going to have epidural  Consider referral to neurosurgery   Anxiety well controlled     Please note that the above documentation was prepared using voice recognition software. Every attempt was made to ensure accuracy but there may be spelling, grammatical, and contextual errors. Electronically signed by Katie Huizar DO on 9/20/2019 at 10:44 AM        No follow-ups on file.     Katie Huizar DO

## 2019-09-30 ENCOUNTER — TELEPHONE (OUTPATIENT)
Dept: PRIMARY CARE CLINIC | Age: 49
End: 2019-09-30

## 2019-09-30 DIAGNOSIS — M54.31 BILATERAL SCIATICA: Primary | ICD-10-CM

## 2019-09-30 DIAGNOSIS — M54.32 BILATERAL SCIATICA: Primary | ICD-10-CM

## 2019-10-02 ENCOUNTER — HOSPITAL ENCOUNTER (OUTPATIENT)
Dept: ULTRASOUND IMAGING | Age: 49
Discharge: HOME | End: 2019-10-04
Payer: OTHER GOVERNMENT

## 2019-10-02 ENCOUNTER — TELEPHONE (OUTPATIENT)
Dept: PRIMARY CARE CLINIC | Age: 49
End: 2019-10-02

## 2019-10-02 ENCOUNTER — ANESTHESIA EVENT (OUTPATIENT)
Dept: OPERATING ROOM | Age: 49
End: 2019-10-02
Payer: OTHER GOVERNMENT

## 2019-10-02 DIAGNOSIS — K76.0 FATTY LIVER DISEASE, NONALCOHOLIC: ICD-10-CM

## 2019-10-02 PROCEDURE — 76705 ECHO EXAM OF ABDOMEN: CPT

## 2019-10-03 ENCOUNTER — HOSPITAL ENCOUNTER (OUTPATIENT)
Age: 49
Setting detail: OUTPATIENT SURGERY
Discharge: HOME | End: 2019-10-03
Attending: PAIN MEDICINE | Admitting: PAIN MEDICINE
Payer: OTHER GOVERNMENT

## 2019-10-03 ENCOUNTER — ANESTHESIA (OUTPATIENT)
Dept: OPERATING ROOM | Age: 49
End: 2019-10-03
Payer: OTHER GOVERNMENT

## 2019-10-03 VITALS
DIASTOLIC BLOOD PRESSURE: 89 MMHG | OXYGEN SATURATION: 99 % | SYSTOLIC BLOOD PRESSURE: 129 MMHG | RESPIRATION RATE: 16 BRPM

## 2019-10-03 VITALS
RESPIRATION RATE: 14 BRPM | HEART RATE: 89 BPM | HEIGHT: 60 IN | SYSTOLIC BLOOD PRESSURE: 173 MMHG | TEMPERATURE: 98 F | OXYGEN SATURATION: 95 % | BODY MASS INDEX: 42.8 KG/M2 | DIASTOLIC BLOOD PRESSURE: 91 MMHG | WEIGHT: 218 LBS

## 2019-10-03 DIAGNOSIS — M51.9 LUMBAR DISC DISEASE: ICD-10-CM

## 2019-10-03 DIAGNOSIS — M54.32 BILATERAL SCIATICA: Primary | ICD-10-CM

## 2019-10-03 DIAGNOSIS — M54.31 BILATERAL SCIATICA: Primary | ICD-10-CM

## 2019-10-03 LAB — METER GLUCOSE: 161 MG/DL (ref 74–99)

## 2019-10-03 PROCEDURE — 3700000001 HC ADD 15 MINUTES (ANESTHESIA): Performed by: PAIN MEDICINE

## 2019-10-03 PROCEDURE — 82962 GLUCOSE BLOOD TEST: CPT

## 2019-10-03 PROCEDURE — 6360000004 HC RX CONTRAST MEDICATION: Performed by: PAIN MEDICINE

## 2019-10-03 PROCEDURE — 2500000003 HC RX 250 WO HCPCS: Performed by: PAIN MEDICINE

## 2019-10-03 PROCEDURE — 3600000005 HC SURGERY LEVEL 5 BASE: Performed by: PAIN MEDICINE

## 2019-10-03 PROCEDURE — 2580000003 HC RX 258: Performed by: ANESTHESIOLOGY

## 2019-10-03 PROCEDURE — 2500000003 HC RX 250 WO HCPCS: Performed by: NURSE ANESTHETIST, CERTIFIED REGISTERED

## 2019-10-03 PROCEDURE — 3700000000 HC ANESTHESIA ATTENDED CARE: Performed by: PAIN MEDICINE

## 2019-10-03 PROCEDURE — 6360000002 HC RX W HCPCS: Performed by: PAIN MEDICINE

## 2019-10-03 PROCEDURE — 7100000011 HC PHASE II RECOVERY - ADDTL 15 MIN: Performed by: PAIN MEDICINE

## 2019-10-03 PROCEDURE — 3600000015 HC SURGERY LEVEL 5 ADDTL 15MIN: Performed by: PAIN MEDICINE

## 2019-10-03 PROCEDURE — 7100000010 HC PHASE II RECOVERY - FIRST 15 MIN: Performed by: PAIN MEDICINE

## 2019-10-03 PROCEDURE — 6360000002 HC RX W HCPCS: Performed by: NURSE ANESTHETIST, CERTIFIED REGISTERED

## 2019-10-03 PROCEDURE — 2709999900 HC NON-CHARGEABLE SUPPLY: Performed by: PAIN MEDICINE

## 2019-10-03 PROCEDURE — 64483 NJX AA&/STRD TFRM EPI L/S 1: CPT | Performed by: PAIN MEDICINE

## 2019-10-03 RX ORDER — LIDOCAINE HYDROCHLORIDE 5 MG/ML
INJECTION, SOLUTION INFILTRATION; INTRAVENOUS PRN
Status: DISCONTINUED | OUTPATIENT
Start: 2019-10-03 | End: 2019-10-03 | Stop reason: ALTCHOICE

## 2019-10-03 RX ORDER — MIDAZOLAM HYDROCHLORIDE 1 MG/ML
INJECTION INTRAMUSCULAR; INTRAVENOUS PRN
Status: DISCONTINUED | OUTPATIENT
Start: 2019-10-03 | End: 2019-10-03 | Stop reason: SDUPTHER

## 2019-10-03 RX ORDER — PROPOFOL 10 MG/ML
INJECTION, EMULSION INTRAVENOUS PRN
Status: DISCONTINUED | OUTPATIENT
Start: 2019-10-03 | End: 2019-10-03 | Stop reason: SDUPTHER

## 2019-10-03 RX ORDER — HYDRALAZINE HYDROCHLORIDE 20 MG/ML
5 INJECTION INTRAMUSCULAR; INTRAVENOUS EVERY 10 MIN PRN
Status: DISCONTINUED | OUTPATIENT
Start: 2019-10-03 | End: 2019-10-03 | Stop reason: HOSPADM

## 2019-10-03 RX ORDER — MEPERIDINE HYDROCHLORIDE 50 MG/ML
12.5 INJECTION INTRAMUSCULAR; INTRAVENOUS; SUBCUTANEOUS EVERY 5 MIN PRN
Status: DISCONTINUED | OUTPATIENT
Start: 2019-10-03 | End: 2019-10-03 | Stop reason: HOSPADM

## 2019-10-03 RX ORDER — PROMETHAZINE HYDROCHLORIDE 25 MG/ML
25 INJECTION, SOLUTION INTRAMUSCULAR; INTRAVENOUS
Status: DISCONTINUED | OUTPATIENT
Start: 2019-10-03 | End: 2019-10-03 | Stop reason: HOSPADM

## 2019-10-03 RX ORDER — LABETALOL 20 MG/4 ML (5 MG/ML) INTRAVENOUS SYRINGE
5 EVERY 10 MIN PRN
Status: DISCONTINUED | OUTPATIENT
Start: 2019-10-03 | End: 2019-10-03 | Stop reason: HOSPADM

## 2019-10-03 RX ORDER — DIPHENHYDRAMINE HYDROCHLORIDE 50 MG/ML
12.5 INJECTION INTRAMUSCULAR; INTRAVENOUS
Status: DISCONTINUED | OUTPATIENT
Start: 2019-10-03 | End: 2019-10-03 | Stop reason: HOSPADM

## 2019-10-03 RX ORDER — SODIUM CHLORIDE, SODIUM LACTATE, POTASSIUM CHLORIDE, CALCIUM CHLORIDE 600; 310; 30; 20 MG/100ML; MG/100ML; MG/100ML; MG/100ML
INJECTION, SOLUTION INTRAVENOUS CONTINUOUS
Status: DISCONTINUED | OUTPATIENT
Start: 2019-10-03 | End: 2019-10-03 | Stop reason: HOSPADM

## 2019-10-03 RX ORDER — ALFENTANIL HYDROCHLORIDE 500 UG/ML
INJECTION INTRAVENOUS PRN
Status: DISCONTINUED | OUTPATIENT
Start: 2019-10-03 | End: 2019-10-03 | Stop reason: SDUPTHER

## 2019-10-03 RX ADMIN — ALFENTANIL HYDROCHLORIDE 250 MCG: 500 INJECTION INTRAVENOUS at 14:27

## 2019-10-03 RX ADMIN — PROPOFOL 30 MG: 10 INJECTION, EMULSION INTRAVENOUS at 14:20

## 2019-10-03 RX ADMIN — ALFENTANIL HYDROCHLORIDE 250 MCG: 500 INJECTION INTRAVENOUS at 14:22

## 2019-10-03 RX ADMIN — SODIUM CHLORIDE, POTASSIUM CHLORIDE, SODIUM LACTATE AND CALCIUM CHLORIDE: 600; 310; 30; 20 INJECTION, SOLUTION INTRAVENOUS at 13:11

## 2019-10-03 RX ADMIN — ALFENTANIL HYDROCHLORIDE 250 MCG: 500 INJECTION INTRAVENOUS at 14:20

## 2019-10-03 RX ADMIN — MIDAZOLAM 2 MG: 1 INJECTION INTRAMUSCULAR; INTRAVENOUS at 14:18

## 2019-10-03 RX ADMIN — PROPOFOL 10 MG: 10 INJECTION, EMULSION INTRAVENOUS at 14:22

## 2019-10-03 RX ADMIN — ALFENTANIL HYDROCHLORIDE 250 MCG: 500 INJECTION INTRAVENOUS at 14:31

## 2019-10-03 ASSESSMENT — PAIN - FUNCTIONAL ASSESSMENT: PAIN_FUNCTIONAL_ASSESSMENT: 0-10

## 2019-10-03 ASSESSMENT — PAIN SCALES - GENERAL
PAINLEVEL_OUTOF10: 0

## 2019-10-03 ASSESSMENT — PAIN DESCRIPTION - DESCRIPTORS: DESCRIPTORS: ACHING;DISCOMFORT

## 2019-10-11 ENCOUNTER — TELEPHONE (OUTPATIENT)
Dept: CARDIOLOGY | Age: 49
End: 2019-10-11

## 2019-10-16 ENCOUNTER — TELEPHONE (OUTPATIENT)
Dept: ADMINISTRATIVE | Age: 49
End: 2019-10-16

## 2019-10-18 ENCOUNTER — OFFICE VISIT (OUTPATIENT)
Dept: PAIN MANAGEMENT | Age: 49
End: 2019-10-18
Payer: OTHER GOVERNMENT

## 2019-10-18 ENCOUNTER — PREP FOR PROCEDURE (OUTPATIENT)
Dept: PAIN MANAGEMENT | Age: 49
End: 2019-10-18

## 2019-10-18 VITALS
WEIGHT: 215 LBS | HEIGHT: 60 IN | RESPIRATION RATE: 16 BRPM | TEMPERATURE: 98.7 F | OXYGEN SATURATION: 95 % | DIASTOLIC BLOOD PRESSURE: 72 MMHG | BODY MASS INDEX: 42.21 KG/M2 | SYSTOLIC BLOOD PRESSURE: 118 MMHG | HEART RATE: 85 BPM

## 2019-10-18 DIAGNOSIS — M51.9 LUMBAR DISC DISORDER: ICD-10-CM

## 2019-10-18 DIAGNOSIS — M48.061 SPINAL STENOSIS OF LUMBAR REGION WITHOUT NEUROGENIC CLAUDICATION: ICD-10-CM

## 2019-10-18 DIAGNOSIS — M47.816 LUMBAR SPONDYLOSIS: ICD-10-CM

## 2019-10-18 DIAGNOSIS — M51.26 HERNIATED LUMBAR INTERVERTEBRAL DISC: Primary | ICD-10-CM

## 2019-10-18 DIAGNOSIS — M54.16 LUMBAR RADICULOPATHY: Primary | ICD-10-CM

## 2019-10-18 DIAGNOSIS — G89.4 CHRONIC PAIN SYNDROME: ICD-10-CM

## 2019-10-18 DIAGNOSIS — M54.16 LUMBAR RADICULOPATHY: ICD-10-CM

## 2019-10-18 PROCEDURE — 99214 OFFICE O/P EST MOD 30 MIN: CPT | Performed by: PAIN MEDICINE

## 2019-10-18 PROCEDURE — 99213 OFFICE O/P EST LOW 20 MIN: CPT | Performed by: PAIN MEDICINE

## 2019-11-01 ENCOUNTER — TELEPHONE (OUTPATIENT)
Dept: CARDIOLOGY | Age: 49
End: 2019-11-01

## 2019-11-06 ENCOUNTER — HOSPITAL ENCOUNTER (OUTPATIENT)
Dept: CARDIOLOGY | Age: 49
Discharge: HOME | End: 2019-11-06
Payer: OTHER GOVERNMENT

## 2019-11-06 DIAGNOSIS — R06.09 DYSPNEA ON EXERTION: ICD-10-CM

## 2019-11-06 LAB
LV EF: 55 %
LVEF MODALITY: NORMAL

## 2019-11-06 PROCEDURE — 2580000003 HC RX 258: Performed by: INTERNAL MEDICINE

## 2019-11-06 PROCEDURE — 93306 TTE W/DOPPLER COMPLETE: CPT

## 2019-11-06 PROCEDURE — 6360000004 HC RX CONTRAST MEDICATION: Performed by: INTERNAL MEDICINE

## 2019-11-06 RX ORDER — SODIUM CHLORIDE 0.9 % (FLUSH) 0.9 %
10 SYRINGE (ML) INJECTION PRN
Status: DISCONTINUED | OUTPATIENT
Start: 2019-11-06 | End: 2019-11-07 | Stop reason: HOSPADM

## 2019-11-06 RX ADMIN — Medication 10 ML: at 14:05

## 2019-11-06 RX ADMIN — Medication 10 ML: at 14:10

## 2019-11-06 RX ADMIN — PERFLUTREN 1.1 MG: 6.52 INJECTION, SUSPENSION INTRAVENOUS at 14:05

## 2019-11-13 ENCOUNTER — OFFICE VISIT (OUTPATIENT)
Dept: PRIMARY CARE CLINIC | Age: 49
End: 2019-11-13
Payer: OTHER GOVERNMENT

## 2019-11-13 VITALS
SYSTOLIC BLOOD PRESSURE: 100 MMHG | TEMPERATURE: 97.7 F | HEIGHT: 60 IN | DIASTOLIC BLOOD PRESSURE: 66 MMHG | OXYGEN SATURATION: 97 % | WEIGHT: 227 LBS | HEART RATE: 81 BPM | BODY MASS INDEX: 44.57 KG/M2

## 2019-11-13 DIAGNOSIS — I10 ESSENTIAL HYPERTENSION: ICD-10-CM

## 2019-11-13 DIAGNOSIS — R07.89 CHEST PAIN, ATYPICAL: ICD-10-CM

## 2019-11-13 DIAGNOSIS — M54.31 BILATERAL SCIATICA: ICD-10-CM

## 2019-11-13 DIAGNOSIS — M54.32 BILATERAL SCIATICA: ICD-10-CM

## 2019-11-13 DIAGNOSIS — F41.9 ANXIETY: Primary | ICD-10-CM

## 2019-11-13 DIAGNOSIS — E66.01 CLASS 3 SEVERE OBESITY DUE TO EXCESS CALORIES WITH SERIOUS COMORBIDITY AND BODY MASS INDEX (BMI) OF 45.0 TO 49.9 IN ADULT (HCC): ICD-10-CM

## 2019-11-13 PROCEDURE — 99213 OFFICE O/P EST LOW 20 MIN: CPT | Performed by: INTERNAL MEDICINE

## 2019-11-13 RX ORDER — TEMAZEPAM 15 MG/1
CAPSULE ORAL
Refills: 0 | COMMUNITY
Start: 2019-11-11 | End: 2022-05-05

## 2019-12-11 ENCOUNTER — OFFICE VISIT (OUTPATIENT)
Dept: PRIMARY CARE CLINIC | Age: 49
End: 2019-12-11
Payer: OTHER GOVERNMENT

## 2019-12-11 VITALS
WEIGHT: 225 LBS | HEART RATE: 83 BPM | SYSTOLIC BLOOD PRESSURE: 100 MMHG | DIASTOLIC BLOOD PRESSURE: 62 MMHG | OXYGEN SATURATION: 97 % | BODY MASS INDEX: 44.17 KG/M2 | TEMPERATURE: 97.1 F | HEIGHT: 60 IN

## 2019-12-11 DIAGNOSIS — Z01.810 PREOPERATIVE CARDIOVASCULAR EXAMINATION: Primary | ICD-10-CM

## 2019-12-11 DIAGNOSIS — M54.32 BILATERAL SCIATICA: ICD-10-CM

## 2019-12-11 DIAGNOSIS — M54.31 BILATERAL SCIATICA: ICD-10-CM

## 2019-12-11 DIAGNOSIS — D68.51 FACTOR 5 LEIDEN MUTATION, HETEROZYGOUS (HCC): ICD-10-CM

## 2019-12-11 PROCEDURE — 99213 OFFICE O/P EST LOW 20 MIN: CPT | Performed by: INTERNAL MEDICINE

## 2019-12-16 ENCOUNTER — TELEPHONE (OUTPATIENT)
Dept: ADMINISTRATIVE | Age: 49
End: 2019-12-16

## 2019-12-26 ENCOUNTER — HOSPITAL ENCOUNTER (OUTPATIENT)
Dept: GENERAL RADIOLOGY | Age: 49
Discharge: HOME | End: 2019-12-28
Payer: OTHER GOVERNMENT

## 2019-12-26 ENCOUNTER — HOSPITAL ENCOUNTER (OUTPATIENT)
Age: 49
Discharge: HOME | End: 2019-12-28
Payer: OTHER GOVERNMENT

## 2019-12-26 ENCOUNTER — INITIAL CONSULT (OUTPATIENT)
Dept: NEUROSURGERY | Age: 49
End: 2019-12-26
Payer: OTHER GOVERNMENT

## 2019-12-26 VITALS
HEART RATE: 97 BPM | BODY MASS INDEX: 43.39 KG/M2 | SYSTOLIC BLOOD PRESSURE: 113 MMHG | WEIGHT: 221 LBS | DIASTOLIC BLOOD PRESSURE: 70 MMHG | HEIGHT: 60 IN

## 2019-12-26 DIAGNOSIS — M51.26 LUMBAR DISC HERNIATION: ICD-10-CM

## 2019-12-26 DIAGNOSIS — M51.26 LUMBAR DISC HERNIATION: Primary | ICD-10-CM

## 2019-12-26 PROCEDURE — 99203 OFFICE O/P NEW LOW 30 MIN: CPT | Performed by: PHYSICIAN ASSISTANT

## 2019-12-26 PROCEDURE — 72120 X-RAY BEND ONLY L-S SPINE: CPT

## 2019-12-26 ASSESSMENT — ENCOUNTER SYMPTOMS
EYES NEGATIVE: 1
GASTROINTESTINAL NEGATIVE: 1
BACK PAIN: 1
ALLERGIC/IMMUNOLOGIC NEGATIVE: 1
RESPIRATORY NEGATIVE: 1

## 2020-02-17 RX ORDER — HYDROXYZINE HYDROCHLORIDE 25 MG/1
TABLET, FILM COATED ORAL
Qty: 90 TABLET | Refills: 1 | Status: SHIPPED
Start: 2020-02-17 | End: 2022-05-05

## 2020-02-18 ENCOUNTER — TELEPHONE (OUTPATIENT)
Dept: PRIMARY CARE CLINIC | Age: 50
End: 2020-02-18

## 2020-02-18 RX ORDER — FAMOTIDINE 20 MG/1
20 TABLET, FILM COATED ORAL 2 TIMES DAILY
Qty: 180 TABLET | Refills: 1 | Status: SHIPPED | OUTPATIENT
Start: 2020-02-18

## 2020-02-21 ENCOUNTER — APPOINTMENT (OUTPATIENT)
Dept: GENERAL RADIOLOGY | Age: 50
End: 2020-02-21
Payer: OTHER GOVERNMENT

## 2020-02-21 ENCOUNTER — HOSPITAL ENCOUNTER (EMERGENCY)
Age: 50
Discharge: HOME | End: 2020-02-22
Payer: OTHER GOVERNMENT

## 2020-02-21 LAB
CHP ED QC CHECK: NORMAL
GLUCOSE BLD-MCNC: 199 MG/DL
HCT VFR BLD CALC: 40.2 % (ref 34–48)
HEMOGLOBIN: 13.2 G/DL (ref 11.5–15.5)
INFLUENZA A BY PCR: DETECTED
INFLUENZA B BY PCR: NOT DETECTED
MCH RBC QN AUTO: 26.1 PG (ref 26–35)
MCHC RBC AUTO-ENTMCNC: 32.8 % (ref 32–34.5)
MCV RBC AUTO: 79.6 FL (ref 80–99.9)
METER GLUCOSE: 199 MG/DL (ref 74–99)
PDW BLD-RTO: 12.8 FL (ref 11.5–15)
PLATELET # BLD: 212 E9/L (ref 130–450)
PMV BLD AUTO: 10.1 FL (ref 7–12)
RBC # BLD: 5.05 E12/L (ref 3.5–5.5)
REASON FOR REJECTION: NORMAL
REJECTED TEST: NORMAL
WBC # BLD: 6.8 E9/L (ref 4.5–11.5)

## 2020-02-21 PROCEDURE — 2580000003 HC RX 258: Performed by: PHYSICIAN ASSISTANT

## 2020-02-21 PROCEDURE — 82962 GLUCOSE BLOOD TEST: CPT

## 2020-02-21 PROCEDURE — 87502 INFLUENZA DNA AMP PROBE: CPT

## 2020-02-21 PROCEDURE — 6360000002 HC RX W HCPCS: Performed by: PHYSICIAN ASSISTANT

## 2020-02-21 PROCEDURE — 6370000000 HC RX 637 (ALT 250 FOR IP): Performed by: PHYSICIAN ASSISTANT

## 2020-02-21 PROCEDURE — 96374 THER/PROPH/DIAG INJ IV PUSH: CPT

## 2020-02-21 PROCEDURE — 71046 X-RAY EXAM CHEST 2 VIEWS: CPT

## 2020-02-21 PROCEDURE — 36415 COLL VENOUS BLD VENIPUNCTURE: CPT

## 2020-02-21 PROCEDURE — 85027 COMPLETE CBC AUTOMATED: CPT

## 2020-02-21 PROCEDURE — 99284 EMERGENCY DEPT VISIT MOD MDM: CPT

## 2020-02-21 PROCEDURE — 94664 DEMO&/EVAL PT USE INHALER: CPT

## 2020-02-21 PROCEDURE — 93005 ELECTROCARDIOGRAM TRACING: CPT | Performed by: PHYSICIAN ASSISTANT

## 2020-02-21 PROCEDURE — 96361 HYDRATE IV INFUSION ADD-ON: CPT

## 2020-02-21 RX ORDER — KETOROLAC TROMETHAMINE 30 MG/ML
30 INJECTION, SOLUTION INTRAMUSCULAR; INTRAVENOUS ONCE
Status: COMPLETED | OUTPATIENT
Start: 2020-02-21 | End: 2020-02-21

## 2020-02-21 RX ORDER — CEFDINIR 300 MG/1
300 CAPSULE ORAL 2 TIMES DAILY
Qty: 20 CAPSULE | Refills: 0 | Status: SHIPPED | OUTPATIENT
Start: 2020-02-21 | End: 2020-02-22

## 2020-02-21 RX ORDER — ALBUTEROL SULFATE 90 UG/1
2 AEROSOL, METERED RESPIRATORY (INHALATION) EVERY 4 HOURS PRN
Qty: 1 INHALER | Refills: 1 | Status: SHIPPED | OUTPATIENT
Start: 2020-02-21 | End: 2022-04-04 | Stop reason: SDUPTHER

## 2020-02-21 RX ORDER — IPRATROPIUM BROMIDE AND ALBUTEROL SULFATE 2.5; .5 MG/3ML; MG/3ML
1 SOLUTION RESPIRATORY (INHALATION) ONCE
Status: COMPLETED | OUTPATIENT
Start: 2020-02-21 | End: 2020-02-21

## 2020-02-21 RX ORDER — SODIUM CHLORIDE 9 MG/ML
INJECTION, SOLUTION INTRAVENOUS ONCE
Status: COMPLETED | OUTPATIENT
Start: 2020-02-21 | End: 2020-02-22

## 2020-02-21 RX ORDER — 0.9 % SODIUM CHLORIDE 0.9 %
1000 INTRAVENOUS SOLUTION INTRAVENOUS ONCE
Status: COMPLETED | OUTPATIENT
Start: 2020-02-21 | End: 2020-02-21

## 2020-02-21 RX ORDER — CEFDINIR 300 MG/1
300 CAPSULE ORAL ONCE
Status: COMPLETED | OUTPATIENT
Start: 2020-02-21 | End: 2020-02-21

## 2020-02-21 RX ORDER — OXYCODONE HYDROCHLORIDE AND ACETAMINOPHEN 5; 325 MG/1; MG/1
1 TABLET ORAL ONCE
Status: COMPLETED | OUTPATIENT
Start: 2020-02-21 | End: 2020-02-21

## 2020-02-21 RX ADMIN — OXYCODONE HYDROCHLORIDE AND ACETAMINOPHEN 1 TABLET: 5; 325 TABLET ORAL at 23:35

## 2020-02-21 RX ADMIN — SODIUM CHLORIDE: 9 INJECTION, SOLUTION INTRAVENOUS at 22:56

## 2020-02-21 RX ADMIN — SODIUM CHLORIDE 1000 ML: 9 INJECTION, SOLUTION INTRAVENOUS at 21:04

## 2020-02-21 RX ADMIN — KETOROLAC TROMETHAMINE 30 MG: 30 INJECTION, SOLUTION INTRAMUSCULAR; INTRAVENOUS at 22:55

## 2020-02-21 RX ADMIN — IPRATROPIUM BROMIDE AND ALBUTEROL SULFATE 1 AMPULE: .5; 3 SOLUTION RESPIRATORY (INHALATION) at 22:37

## 2020-02-21 RX ADMIN — CEFDINIR 300 MG: 300 CAPSULE ORAL at 23:35

## 2020-02-21 ASSESSMENT — PAIN DESCRIPTION - PAIN TYPE
TYPE: ACUTE PAIN
TYPE: ACUTE PAIN

## 2020-02-21 ASSESSMENT — PAIN SCALES - GENERAL
PAINLEVEL_OUTOF10: 10
PAINLEVEL_OUTOF10: 8
PAINLEVEL_OUTOF10: 10

## 2020-02-21 ASSESSMENT — PAIN DESCRIPTION - LOCATION
LOCATION: EAR
LOCATION: GENERALIZED

## 2020-02-21 ASSESSMENT — PAIN DESCRIPTION - ORIENTATION: ORIENTATION: LEFT

## 2020-02-22 VITALS
DIASTOLIC BLOOD PRESSURE: 58 MMHG | SYSTOLIC BLOOD PRESSURE: 127 MMHG | WEIGHT: 220 LBS | TEMPERATURE: 98.2 F | OXYGEN SATURATION: 96 % | BODY MASS INDEX: 43.19 KG/M2 | HEIGHT: 60 IN | RESPIRATION RATE: 16 BRPM | HEART RATE: 103 BPM

## 2020-02-22 LAB
EKG ATRIAL RATE: 111 BPM
EKG P AXIS: 63 DEGREES
EKG P-R INTERVAL: 158 MS
EKG Q-T INTERVAL: 356 MS
EKG QRS DURATION: 82 MS
EKG QTC CALCULATION (BAZETT): 484 MS
EKG R AXIS: -13 DEGREES
EKG T AXIS: 9 DEGREES
EKG VENTRICULAR RATE: 111 BPM

## 2020-02-22 PROCEDURE — 2500000003 HC RX 250 WO HCPCS: Performed by: PHYSICIAN ASSISTANT

## 2020-02-22 PROCEDURE — 6360000002 HC RX W HCPCS: Performed by: PHYSICIAN ASSISTANT

## 2020-02-22 PROCEDURE — 93010 ELECTROCARDIOGRAM REPORT: CPT | Performed by: INTERNAL MEDICINE

## 2020-02-22 PROCEDURE — 96361 HYDRATE IV INFUSION ADD-ON: CPT

## 2020-02-22 PROCEDURE — 96375 TX/PRO/DX INJ NEW DRUG ADDON: CPT

## 2020-02-22 RX ORDER — LORAZEPAM 2 MG/ML
1 INJECTION INTRAMUSCULAR ONCE
Status: DISCONTINUED | OUTPATIENT
Start: 2020-02-22 | End: 2020-02-22

## 2020-02-22 RX ORDER — DOXYCYCLINE HYCLATE 100 MG
100 TABLET ORAL 2 TIMES DAILY
Qty: 20 TABLET | Refills: 0 | Status: SHIPPED | OUTPATIENT
Start: 2020-02-22 | End: 2020-03-03

## 2020-02-22 RX ORDER — METHYLPREDNISOLONE SODIUM SUCCINATE 125 MG/2ML
125 INJECTION, POWDER, LYOPHILIZED, FOR SOLUTION INTRAMUSCULAR; INTRAVENOUS ONCE
Status: COMPLETED | OUTPATIENT
Start: 2020-02-22 | End: 2020-02-22

## 2020-02-22 RX ORDER — AZITHROMYCIN 250 MG/1
TABLET, FILM COATED ORAL
Qty: 1 PACKET | Refills: 0 | Status: SHIPPED | OUTPATIENT
Start: 2020-02-22 | End: 2020-03-03

## 2020-02-22 RX ADMIN — FAMOTIDINE 20 MG: 10 INJECTION INTRAVENOUS at 00:49

## 2020-02-22 RX ADMIN — METHYLPREDNISOLONE SODIUM SUCCINATE 125 MG: 125 INJECTION, POWDER, FOR SOLUTION INTRAMUSCULAR; INTRAVENOUS at 00:49

## 2020-02-22 ASSESSMENT — PAIN SCALES - GENERAL: PAINLEVEL_OUTOF10: 2

## 2020-02-22 ASSESSMENT — PAIN DESCRIPTION - PAIN TYPE: TYPE: ACUTE PAIN

## 2020-02-22 NOTE — ED PROVIDER NOTES
Care taken over at 2200.    2230 patient complaining of pain of the left ear. Toradol ordered. 2330 patient crying writhing in pain complaining of persistent left ear pain patient appears very anxious. Exam of the ears shows some erythema of the canal TM she was given erythromycin and Percocet    00 25 nurse notifies me that patient has erythema of the face. She denies any shortness of breath no difficulty swallowing no pruritus. Solu-Medrol and Pepcid ordered at this time patient has allergy associated with the Benadryl. 0100 patient resting comfortably erythema of the face improving after Solu-Medrol and Pepcid. Patient discharged to home on doxycycline for otitis media. The reaction possibly  was due to the Omnicef Toradol or Percocet.        Rickey Sharma  02/22/20 1824

## 2020-02-25 ENCOUNTER — TELEPHONE (OUTPATIENT)
Dept: CARDIOLOGY CLINIC | Age: 50
End: 2020-02-25

## 2020-02-25 NOTE — TELEPHONE ENCOUNTER
Pt called to schedule her referral to cardio from Dr Leyla Rosa for  Pre op clearance for spinal surgery, not yet scheduled. Pt is scheduled with Dr Taylor Martel on 03-11-20, cardiac hx is already in media.

## 2020-02-25 NOTE — TELEPHONE ENCOUNTER
New patient packet mailed along with a release of records in order to obtain old cardiology notes from [facility]. LM to patient regarding this. Dr. Theodora Antunez notes are in [address] [place] [address] Rd.

## 2020-03-04 ENCOUNTER — OFFICE VISIT (OUTPATIENT)
Dept: PRIMARY CARE CLINIC | Age: 50
End: 2020-03-04
Payer: OTHER GOVERNMENT

## 2020-03-04 VITALS
SYSTOLIC BLOOD PRESSURE: 130 MMHG | BODY MASS INDEX: 43.36 KG/M2 | DIASTOLIC BLOOD PRESSURE: 80 MMHG | TEMPERATURE: 98.3 F | OXYGEN SATURATION: 99 % | HEART RATE: 84 BPM | WEIGHT: 222 LBS

## 2020-03-04 LAB — HBA1C MFR BLD: 10.6 %

## 2020-03-04 PROCEDURE — 99213 OFFICE O/P EST LOW 20 MIN: CPT | Performed by: INTERNAL MEDICINE

## 2020-03-04 PROCEDURE — 83036 HEMOGLOBIN GLYCOSYLATED A1C: CPT | Performed by: INTERNAL MEDICINE

## 2020-03-04 RX ORDER — METHYLPREDNISOLONE 4 MG/1
TABLET ORAL
Qty: 1 KIT | Refills: 0 | Status: SHIPPED | OUTPATIENT
Start: 2020-03-04 | End: 2020-03-10

## 2020-03-04 RX ORDER — BROMPHENIRAMINE MALEATE, PSEUDOEPHEDRINE HYDROCHLORIDE, AND DEXTROMETHORPHAN HYDROBROMIDE 2; 30; 10 MG/5ML; MG/5ML; MG/5ML
5 SYRUP ORAL 4 TIMES DAILY PRN
Qty: 1 BOTTLE | Refills: 0 | Status: SHIPPED
Start: 2020-03-04 | End: 2022-05-05

## 2020-03-04 NOTE — PROGRESS NOTES
was made to ensure accuracy but there may be spelling, grammatical, and contextual errors. Electronically signed by Dusty Vences DO on 3/4/2020 at 11:51 AM        No follow-ups on file.     Dusty Vences DO

## 2020-04-29 RX ORDER — METOPROLOL SUCCINATE 100 MG
TABLET, EXTENDED RELEASE 24 HR ORAL
Qty: 90 TABLET | Refills: 3 | Status: SHIPPED
Start: 2020-04-29 | End: 2022-04-04 | Stop reason: SDUPTHER

## 2020-04-29 RX ORDER — ATORVASTATIN CALCIUM 10 MG/1
TABLET, FILM COATED ORAL
Qty: 90 TABLET | Refills: 3 | Status: SHIPPED
Start: 2020-04-29 | End: 2021-06-11 | Stop reason: SDUPTHER

## 2020-08-18 ENCOUNTER — TELEPHONE (OUTPATIENT)
Dept: PRIMARY CARE CLINIC | Age: 50
End: 2020-08-18

## 2020-08-18 NOTE — TELEPHONE ENCOUNTER
Patient needs a new referral for Pain Management with Dr. Beti Lau at [facility]. Her previous order was canceled due to patient wanting to wait until after her hip surgery. Please order if you agree.     Fax [phone]

## 2020-09-29 ENCOUNTER — TELEPHONE (OUTPATIENT)
Dept: PRIMARY CARE CLINIC | Age: 50
End: 2020-09-29

## 2020-09-29 NOTE — TELEPHONE ENCOUNTER
Dimple albertse referral back to Dr Sonia Muñoz at [facility] for her chronic back pain

## 2020-10-21 ENCOUNTER — TELEPHONE (OUTPATIENT)
Dept: FAMILY MEDICINE CLINIC | Age: 50
End: 2020-10-21

## 2020-10-21 NOTE — TELEPHONE ENCOUNTER
LEFT MESSAGE FOR PATIENT TO RETURN CALL CONCERNING OVERDUE A1C.  PER PROVIDER PATIENT CAN BE SCHEDULED FOR NURSE VISIT ON Wednesday OR Thursday FOR POCT A1C

## 2021-05-27 ENCOUNTER — HOSPITAL ENCOUNTER (OUTPATIENT)
Dept: MRI IMAGING | Age: 51
Discharge: HOME | End: 2021-05-29
Payer: COMMERCIAL

## 2021-05-27 DIAGNOSIS — M54.16 LUMBAR RADICULOPATHY: ICD-10-CM

## 2021-05-27 DIAGNOSIS — M51.36 DEGENERATION OF LUMBAR INTERVERTEBRAL DISC: ICD-10-CM

## 2021-05-27 PROCEDURE — 72148 MRI LUMBAR SPINE W/O DYE: CPT

## 2021-06-11 RX ORDER — METOPROLOL SUCCINATE 100 MG/1
100 TABLET, EXTENDED RELEASE ORAL DAILY
Qty: 30 TABLET | Refills: 1 | Status: SHIPPED
Start: 2021-06-11 | End: 2021-08-19 | Stop reason: SDUPTHER

## 2021-06-11 RX ORDER — ATORVASTATIN CALCIUM 10 MG/1
TABLET, FILM COATED ORAL
Qty: 30 TABLET | Refills: 1 | Status: SHIPPED
Start: 2021-06-11 | End: 2021-07-06 | Stop reason: SDUPTHER

## 2021-07-06 RX ORDER — ATORVASTATIN CALCIUM 10 MG/1
TABLET, FILM COATED ORAL
Qty: 90 TABLET | Refills: 1 | Status: SHIPPED
Start: 2021-07-06 | End: 2022-04-04 | Stop reason: SDUPTHER

## 2021-08-19 RX ORDER — METOPROLOL SUCCINATE 100 MG/1
100 TABLET, EXTENDED RELEASE ORAL DAILY
Qty: 30 TABLET | Refills: 2 | Status: SHIPPED
Start: 2021-08-19 | End: 2021-08-20 | Stop reason: SDUPTHER

## 2021-08-19 NOTE — TELEPHONE ENCOUNTER
----- Message from Ronel Barboza sent at 8/18/2021  1:55 PM EDT -----  Subject: Refill Request    QUESTIONS  Name of Medication? metoprolol succinate (TOPROL XL) 100 MG extended   release tablet  Patient-reported dosage and instructions? needs refill   How many days do you have left? 0  Preferred Pharmacy? [facility]  Pharmacy phone number (if available)? [phone]  Additional Information for Provider? needs refill, home Phone   [phone]), [facility]   ---------------------------------------------------------------------------  --------------  [address]  What is the best way for the office to contact you? OK to leave message on   voicemail, OK to respond with electronic message via [employer] portal (only   for patients who have registered [employer] account)  Preferred Call Back Phone Number?  [phone]

## 2021-08-20 RX ORDER — METOPROLOL SUCCINATE 100 MG/1
100 TABLET, EXTENDED RELEASE ORAL DAILY
Qty: 30 TABLET | Refills: 2 | Status: SHIPPED
Start: 2021-08-20 | End: 2021-12-23 | Stop reason: SDUPTHER

## 2021-12-23 RX ORDER — METOPROLOL SUCCINATE 100 MG/1
100 TABLET, EXTENDED RELEASE ORAL DAILY
Qty: 30 TABLET | Refills: 2 | Status: SHIPPED
Start: 2021-12-23 | End: 2022-05-05 | Stop reason: SDUPTHER

## 2022-04-04 ENCOUNTER — TELEMEDICINE (OUTPATIENT)
Dept: PRIMARY CARE CLINIC | Age: 52
End: 2022-04-04
Payer: MEDICARE

## 2022-04-04 DIAGNOSIS — J30.2 SEASONAL ALLERGIES: ICD-10-CM

## 2022-04-04 DIAGNOSIS — E11.9 TYPE 2 DIABETES MELLITUS WITHOUT COMPLICATION, WITH LONG-TERM CURRENT USE OF INSULIN (HCC): ICD-10-CM

## 2022-04-04 DIAGNOSIS — I10 ESSENTIAL HYPERTENSION: ICD-10-CM

## 2022-04-04 DIAGNOSIS — Z79.4 TYPE 2 DIABETES MELLITUS WITHOUT COMPLICATION, WITH LONG-TERM CURRENT USE OF INSULIN (HCC): ICD-10-CM

## 2022-04-04 DIAGNOSIS — F41.9 ANXIETY: ICD-10-CM

## 2022-04-04 DIAGNOSIS — K21.00 GASTROESOPHAGEAL REFLUX DISEASE WITH ESOPHAGITIS WITHOUT HEMORRHAGE: ICD-10-CM

## 2022-04-04 DIAGNOSIS — E78.00 ELEVATED CHOLESTEROL: ICD-10-CM

## 2022-04-04 DIAGNOSIS — J45.909 ACUTE ASTHMA: Primary | ICD-10-CM

## 2022-04-04 PROCEDURE — G8427 DOCREV CUR MEDS BY ELIG CLIN: HCPCS | Performed by: INTERNAL MEDICINE

## 2022-04-04 PROCEDURE — 99214 OFFICE O/P EST MOD 30 MIN: CPT | Performed by: INTERNAL MEDICINE

## 2022-04-04 PROCEDURE — 3046F HEMOGLOBIN A1C LEVEL >9.0%: CPT | Performed by: INTERNAL MEDICINE

## 2022-04-04 PROCEDURE — G8421 BMI NOT CALCULATED: HCPCS | Performed by: INTERNAL MEDICINE

## 2022-04-04 PROCEDURE — 4004F PT TOBACCO SCREEN RCVD TLK: CPT | Performed by: INTERNAL MEDICINE

## 2022-04-04 PROCEDURE — 3017F COLORECTAL CA SCREEN DOC REV: CPT | Performed by: INTERNAL MEDICINE

## 2022-04-04 PROCEDURE — 2022F DILAT RTA XM EVC RTNOPTHY: CPT | Performed by: INTERNAL MEDICINE

## 2022-04-04 RX ORDER — PANTOPRAZOLE SODIUM 40 MG/1
40 TABLET, DELAYED RELEASE ORAL 2 TIMES DAILY
Qty: 30 TABLET | Refills: 1 | Status: SHIPPED
Start: 2022-04-04 | End: 2022-04-05 | Stop reason: SDUPTHER

## 2022-04-04 RX ORDER — INSULIN GLARGINE 100 [IU]/ML
30 INJECTION, SOLUTION SUBCUTANEOUS NIGHTLY
Qty: 15 PEN | Refills: 1 | Status: SHIPPED | OUTPATIENT
Start: 2022-04-04

## 2022-04-04 RX ORDER — BUSPIRONE HYDROCHLORIDE 30 MG/1
TABLET ORAL
COMMUNITY
Start: 2022-03-03

## 2022-04-04 RX ORDER — METOPROLOL SUCCINATE 100 MG/1
TABLET, EXTENDED RELEASE ORAL
Qty: 90 TABLET | Refills: 3 | Status: SHIPPED
Start: 2022-04-04 | End: 2022-04-05 | Stop reason: SDUPTHER

## 2022-04-04 RX ORDER — FLASH GLUCOSE SENSOR
1 KIT MISCELLANEOUS DAILY
Qty: 1 EACH | Refills: 0 | Status: SHIPPED | OUTPATIENT
Start: 2022-04-04

## 2022-04-04 RX ORDER — CETIRIZINE HYDROCHLORIDE 10 MG/1
10 TABLET ORAL DAILY
Qty: 90 TABLET | Refills: 1 | Status: SHIPPED
Start: 2022-04-04 | End: 2022-04-05 | Stop reason: SDUPTHER

## 2022-04-04 RX ORDER — INSULIN LISPRO 100 [IU]/ML
INJECTION, SOLUTION INTRAVENOUS; SUBCUTANEOUS
Qty: 4 PEN | Refills: 1 | Status: SHIPPED | OUTPATIENT
Start: 2022-04-04 | End: 2022-04-13

## 2022-04-04 RX ORDER — ATORVASTATIN CALCIUM 10 MG/1
TABLET, FILM COATED ORAL
Qty: 90 TABLET | Refills: 1 | Status: SHIPPED
Start: 2022-04-04 | End: 2022-04-05 | Stop reason: SDUPTHER

## 2022-04-04 RX ORDER — BUPROPION HYDROCHLORIDE 300 MG/1
TABLET ORAL
COMMUNITY

## 2022-04-04 RX ORDER — ALBUTEROL SULFATE 90 UG/1
2 AEROSOL, METERED RESPIRATORY (INHALATION) EVERY 4 HOURS PRN
Qty: 1 EACH | Refills: 1 | Status: SHIPPED
Start: 2022-04-04 | End: 2022-05-05 | Stop reason: SDUPTHER

## 2022-04-04 SDOH — ECONOMIC STABILITY: FOOD INSECURITY: WITHIN THE PAST 12 MONTHS, YOU WORRIED THAT YOUR FOOD WOULD RUN OUT BEFORE YOU GOT MONEY TO BUY MORE.: NEVER TRUE

## 2022-04-04 SDOH — ECONOMIC STABILITY: FOOD INSECURITY: WITHIN THE PAST 12 MONTHS, THE FOOD YOU BOUGHT JUST DIDN'T LAST AND YOU DIDN'T HAVE MONEY TO GET MORE.: NEVER TRUE

## 2022-04-04 ASSESSMENT — PATIENT HEALTH QUESTIONNAIRE - PHQ9
7. TROUBLE CONCENTRATING ON THINGS, SUCH AS READING THE NEWSPAPER OR WATCHING TELEVISION: 3
5. POOR APPETITE OR OVEREATING: 3
3. TROUBLE FALLING OR STAYING ASLEEP: 3
1. LITTLE INTEREST OR PLEASURE IN DOING THINGS: 3
SUM OF ALL RESPONSES TO PHQ QUESTIONS 1-9: 27
2. FEELING DOWN, DEPRESSED OR HOPELESS: 3
10. IF YOU CHECKED OFF ANY PROBLEMS, HOW DIFFICULT HAVE THESE PROBLEMS MADE IT FOR YOU TO DO YOUR WORK, TAKE CARE OF THINGS AT HOME, OR GET ALONG WITH OTHER PEOPLE: 3
6. FEELING BAD ABOUT YOURSELF - OR THAT YOU ARE A FAILURE OR HAVE LET YOURSELF OR YOUR FAMILY DOWN: 3
8. MOVING OR SPEAKING SO SLOWLY THAT OTHER PEOPLE COULD HAVE NOTICED. OR THE OPPOSITE, BEING SO FIGETY OR RESTLESS THAT YOU HAVE BEEN MOVING AROUND A LOT MORE THAN USUAL: 3
SUM OF ALL RESPONSES TO PHQ QUESTIONS 1-9: 24
SUM OF ALL RESPONSES TO PHQ QUESTIONS 1-9: 27
SUM OF ALL RESPONSES TO PHQ9 QUESTIONS 1 & 2: 6
9. THOUGHTS THAT YOU WOULD BE BETTER OFF DEAD, OR OF HURTING YOURSELF: 3
SUM OF ALL RESPONSES TO PHQ QUESTIONS 1-9: 27
4. FEELING TIRED OR HAVING LITTLE ENERGY: 3

## 2022-04-04 ASSESSMENT — SOCIAL DETERMINANTS OF HEALTH (SDOH): HOW HARD IS IT FOR YOU TO PAY FOR THE VERY BASICS LIKE FOOD, HOUSING, MEDICAL CARE, AND HEATING?: NOT HARD AT ALL

## 2022-04-04 ASSESSMENT — COLUMBIA-SUICIDE SEVERITY RATING SCALE - C-SSRS
2. HAVE YOU ACTUALLY HAD ANY THOUGHTS OF KILLING YOURSELF?: NO
6. HAVE YOU EVER DONE ANYTHING, STARTED TO DO ANYTHING, OR PREPARED TO DO ANYTHING TO END YOUR LIFE?: NO
1. WITHIN THE PAST MONTH, HAVE YOU WISHED YOU WERE DEAD OR WISHED YOU COULD GO TO SLEEP AND NOT WAKE UP?: NO

## 2022-04-04 NOTE — PROGRESS NOTES
4/4/22    Josh Mandujano, a female of 46 y.o. came to the office     Josh Mandujano presents today for virtual visit for evaluation of her chronic medical issues. She has been without her insulin for several months due to insurance reasons/cost.  During this timeframe her blood glucose has been between 240 and 300. She does notice worsening of her vision as well as neuropathy. She previously was taking Humalog R/U5 100. She took 180 units in the morning 160 at lunch and 100 units at bedtime. She noted that at that time she did have problems with hypoglycemia.       Patient Active Problem List   Diagnosis    Essential hypertension    Type 2 diabetes mellitus without complication, with long-term current use of insulin ([facility]    Class 3 severe obesity due to excess calories with serious comorbidity and body mass index (BMI) of 45.0 to 49.9 in [facility])    Other insomnia    Anxiety    Recurrent major depressive disorder, in remission ([facility] [address] [facility]    Idiopathic peripheral neuropathy    Herniated lumbar intervertebral disc    Lumbar spondylosis    Lumbar disc disorder    Lumbar radiculopathy    Chronic pain syndrome    Spinal stenosis of lumbar region without neurogenic claudication    Primary osteoarthritis of left hip      Allergies   Allergen Reactions    Cefdinir      Facial erythema     Clemastine      Other reaction(s): Loose voice    Neurontin [Gabapentin]     Percocet [Oxycodone-Acetaminophen]     Theophyllines     Toradol [Ketorolac Tromethamine]      Face erythema     Vicodin [Hydrocodone-Acetaminophen]     Wellbutrin [Bupropion]     Imodium [Loperamide] Nausea And Vomiting     Current Outpatient Medications on File Prior to Visit   Medication Sig Dispense Refill    buPROPion (WELLBUTRIN XL) 300 MG extended release tablet bupropion HCl  mg 24 hr tablet, extended release   TAKE ONE TABLET BY MOUTH EVERY MORNING      busPIRone (BUSPAR) 30 MG tablet TAKE ONE TABLET BY MOUTH TWO TIMES A DAY      famotidine (PEPCID) 20 MG tablet Take 1 tablet by mouth 2 times daily 180 tablet 1    diphenhydrAMINE HCl (BENADRYL ALLERGY PO) Take by mouth As needed      Handicap Placard MISC by Does not apply route 1 each 0    lisinopril (PRINIVIL;ZESTRIL) 10 MG tablet Take 1 tablet by mouth daily 90 tablet 1    loratadine (CLARITIN) 10 MG capsule Take 10 mg by mouth daily      pregabalin (LYRICA) 100 MG capsule Take 100 mg by mouth 3 times daily. Louanna Maria Luisa dicyclomine (BENTYL) 20 MG tablet Take 20 mg by mouth as needed (stomach pain)      ALPRAZolam (XANAX) 0.5 MG tablet Take 0.5 mg by mouth nightly. Take one tab as needed daily and one tab nightly.  busPIRone (BUSPAR) 15 MG tablet Take 30 mg by mouth 2 times daily      DULoxetine (CYMBALTA) 60 MG extended release capsule Take 120 mg by mouth daily      aspirin 81 MG tablet Take 81 mg by mouth daily      brexpiprazole (REXULTI) 1 MG TABS tablet Take 1 mg by mouth daily       metoprolol succinate (TOPROL XL) 100 MG extended release tablet Take 1 tablet by mouth daily 30 tablet 2    brompheniramine-pseudoephedrine-DM 2-30-10 MG/5ML syrup Take 5 mLs by mouth 4 times daily as needed for Congestion 250 mL 1 Bottle 0    hydrOXYzine (ATARAX) 25 MG tablet TAKE 1 TABLET EVERY 6 HOURS AS NEEDED FOR ANXIETY 90 tablet 1    temazepam (RESTORIL) 15 MG capsule take 1 capsule by mouth at bedtime  0    insulin regular human (HUMULIN R U-500 KWIKPEN) 500 UNIT/ML SOPN concentrated injection pen Inject into the skin 3 times daily (before meals) Sliding scale      cyanocobalamin 1000 MCG/ML injection Inject 1,000 mcg into the muscle once       No current facility-administered medications on file prior to visit. Review of Systems  Constitutional:Negative for activity change, appetite change, chills, fatigue and fever. Respiratory: Negative for choking, chest tightness, shortness of breath and wheezing.   Cardiovascular: Negative for chest pain, palpitations and leg swelling. Gastrointestinal: Negative for abdominal distention, constipation, diarrhea, nausea and vomiting. Musculoskeletal: Negative for arthralgias, back pain, gait problem and joint swelling. Neurological: Negative for dizziness, weakness,numbness and headaches. There were no vitals taken for this visit. Physical Exam   Constitutional:  Oriented to person, place, and time. Appears well-developed and well-nourished. No acute distress. Neurological:Alert and oriented to person, place, and time. Skin: No diaphoresis. Psychiatric: Normal mood and affect. Behavior is Normal.     ASSESSMENT AND PLAN:    Dimple was seen today for diabetes and medication refill. Diagnoses and all orders for this visit:    Acute asthma  -     albuterol sulfate HFA (PROVENTIL HFA) 108 (90 Base) MCG/ACT inhaler; Inhale 2 puffs into the lungs every 4 hours as needed for Wheezing    Essential hypertension  -     metoprolol succinate (TOPROL XL) 100 MG extended release tablet; TAKE 1 TABLET DAILY    Seasonal allergies  -     cetirizine (ZYRTEC) 10 MG tablet; Take 1 tablet by mouth daily    Elevated cholesterol  -     atorvastatin (LIPITOR) 10 MG tablet; TAKE 1 TABLET DAILY    Gastroesophageal reflux disease with esophagitis without hemorrhage  -     pantoprazole (PROTONIX) 40 MG tablet; Take 1 tablet by mouth 2 times daily    Anxiety    Type 2 diabetes mellitus without complication, with long-term current use of insulin (HCC)  -     Alayna Prajapati MD, Endocrinology, [place]  -     insulin glargine (LANTUS SOLOSTAR) 100 UNIT/ML injection pen; Inject 30 Units into the skin nightly  -     insulin lispro, 1 Unit Dial, (HUMALOG KWIKPEN) 100 UNIT/ML SOPN; Take 3 times a day.  Take 0 units for glucose between 70 to 130, 8 units for glucose between 131 to 180.  12 units for glucose between 181 to 240.  16 units for glucose between 241 to 300.  20 units for glucose between 301 to 350.  24 units for glucose between 351 to 400. Call physician if over 400.  -     Continuous Blood Gluc Sensor ([facility]) MISC; 1 Device by Does not apply route daily        She unfortunately has been without her medications due to insurance reasons/cost.  I will start her back on 30 units of long-acting insulin as well as high-dose sliding scale with meals. I also placed a referral to a new endocrinologist and give her prescription for a shiraz continuous glucose monitoring device. I advised her to call back in in the next several days with her blood glucose as we can aggressively increase her insulin if needed. Of note, she was on over 400 units of insulin previously. No follow-ups on file. Electronically signed by Vandana Lizarraga DO on 4/4/2022 at 11:34 AM      TeleMedicine Patient Consent    This visit was performed as a virtual video visit using a synchronous, two-way, audio-video telehealth technology platform. Patient identification was verified at the start of the visit, including the patient's telephone number and physical location. I discussed with the patient the nature of our telehealth visits, that:     1. Due to the nature of an audio- video modality, the only components of a physical exam that could be done are the elements supported by direct observation. 2. I would evaluate the patient and recommend diagnostics and treatments based on my assessment. 3. If it was felt that the patient should be evaluated in clinic or an emergency room setting, then they would be directed there. 4. Our sessions are not being recorded and that personal health information is protected. 5. Our team would provide follow up care in person if/when the patient needs it. Patient Does agree to proceed with telemedicine consultation.     Patient's location: home address in [place] [address]  location home address in [place]  Other people involved in call - None      Time spent: Not billed by time    This visit was completed virtually using D[employer]. me    Due to this being a TeleHealth encounter, evaluation of the following organ systems is limited: Vitals/Constitutional/EENT/Resp/CV/GI//MS/Neuro/Skin/Heme-Lymph-Imm. Pursuant to the emergency declaration under the [address] [place] [address] waiver authority and the [employer] Act, this Virtual  Visit was conducted, with patient's consent, to reduce the patient's risk of exposure to COVID-19 and provide continuity of care for an established patient. Services were provided through a video synchronous discussion virtually to substitute for in-person clinic visit. Please note that the above documentation was prepared using voice recognition software. Every attempt was made to ensure accuracy but there may be spelling, grammatical, and contextual errors. Electronically signed by Gabby Dixon DO on 4/4/2022 at 11:34 AM    Sheryl Cox, was evaluated through a synchronous (real-time (A/V encounter. The patient (or guardian if applicable (is aware that this is a billable service, which includes applicable co-pays. The virtual visit was conducted with patient's 9 and/or legal guardians (consent. The visit was conducted pursuant to the emergency declaration under the [place] act and the [address] waiver authority and the coronavirus preparedness and response supplemental appropriations act. Patient identification was verified, and a caregiver was present when appropriate. The patient was located in a state where the provider was licensed to provide care.

## 2022-04-05 ENCOUNTER — TELEPHONE (OUTPATIENT)
Dept: PRIMARY CARE CLINIC | Age: 52
End: 2022-04-05

## 2022-04-05 DIAGNOSIS — I10 ESSENTIAL HYPERTENSION: ICD-10-CM

## 2022-04-05 DIAGNOSIS — J30.2 SEASONAL ALLERGIES: ICD-10-CM

## 2022-04-05 DIAGNOSIS — E78.00 ELEVATED CHOLESTEROL: ICD-10-CM

## 2022-04-05 DIAGNOSIS — K21.00 GASTROESOPHAGEAL REFLUX DISEASE WITH ESOPHAGITIS WITHOUT HEMORRHAGE: ICD-10-CM

## 2022-04-05 RX ORDER — ATORVASTATIN CALCIUM 10 MG/1
TABLET, FILM COATED ORAL
Qty: 90 TABLET | Refills: 1 | Status: SHIPPED
Start: 2022-04-05 | End: 2022-11-01

## 2022-04-05 RX ORDER — METOPROLOL SUCCINATE 100 MG/1
TABLET, EXTENDED RELEASE ORAL
Qty: 90 TABLET | Refills: 1 | Status: SHIPPED
Start: 2022-04-05 | End: 2022-05-05

## 2022-04-05 RX ORDER — LISINOPRIL 10 MG/1
10 TABLET ORAL DAILY
Qty: 90 TABLET | Refills: 1 | Status: SHIPPED
Start: 2022-04-05 | End: 2022-09-06

## 2022-04-05 RX ORDER — CETIRIZINE HYDROCHLORIDE 10 MG/1
10 TABLET ORAL DAILY
Qty: 90 TABLET | Refills: 1 | Status: SHIPPED
Start: 2022-04-05 | End: 2022-06-22

## 2022-04-05 RX ORDER — PANTOPRAZOLE SODIUM 40 MG/1
40 TABLET, DELAYED RELEASE ORAL 2 TIMES DAILY
Qty: 180 TABLET | Refills: 1 | Status: SHIPPED
Start: 2022-04-05 | End: 2022-07-26 | Stop reason: SDUPTHER

## 2022-04-05 RX ORDER — INSULIN HUMAN 100 [IU]/ML
25 INJECTION, SUSPENSION SUBCUTANEOUS 2 TIMES DAILY
Qty: 5 PEN | Refills: 3 | Status: SHIPPED | OUTPATIENT
Start: 2022-04-05 | End: 2022-04-13 | Stop reason: ALTCHOICE

## 2022-04-05 NOTE — TELEPHONE ENCOUNTER
Patient called in said the rxfor insulin you sent over at yesterday appt. Is $500 and is unable to afford that. She would like to know if she can just go back to taking metformin or shee is a rx for Humulin is cheaper as told by her insurance . she is good with either one and can be sent over to local ,thanks

## 2022-04-07 ENCOUNTER — TELEPHONE (OUTPATIENT)
Dept: PRIMARY CARE CLINIC | Age: 52
End: 2022-04-07

## 2022-04-07 NOTE — TELEPHONE ENCOUNTER
----- Message from Kenny Sheridan sent at 4/6/2022  1:06 PM EDT -----  Subject: Medication Problem    QUESTIONS  Name of Medication? insulin regular human (HUMULIN R U-500 KWIKPEN) 500   UNIT/ML SOPN concentrated injection pen  Patient-reported dosage and instructions? 25 units 2 times daily  What question or problem do you have with the medication? The insurance   either wants Novolin or Novolog. Please call [facility] at [phone] per   Raheel Vizcarra. Preferred Pharmacy? [address] [address] phone number (if available)? [phone]  Additional Information for Provider? Pt just transferred to [facility] from a   different pharmacy.  ---------------------------------------------------------------------------  --------------  [address]  What is the best way for the office to contact you? OK to leave message on   voicemail  Preferred Call Back Phone Number? [phone]  ---------------------------------------------------------------------------  --------------  SCRIPT ANSWERS  Relationship to Patient? Third Party  Third Party Type? Pharmacy? Representative Name?  Raheel Vizcarra

## 2022-04-08 ENCOUNTER — TELEPHONE (OUTPATIENT)
Dept: PRIMARY CARE CLINIC | Age: 52
End: 2022-04-08

## 2022-04-08 NOTE — TELEPHONE ENCOUNTER
[facility] pharmacist called regarding switching some insulin over to ones covered by her insurance, just wanted to clarify if that's okay to proceed. Call [phone] press 0 for pharmacy.

## 2022-05-05 ENCOUNTER — TELEMEDICINE (OUTPATIENT)
Dept: PRIMARY CARE CLINIC | Age: 52
End: 2022-05-05
Payer: MEDICARE

## 2022-05-05 DIAGNOSIS — F33.40 RECURRENT MAJOR DEPRESSIVE DISORDER, IN REMISSION (HCC): ICD-10-CM

## 2022-05-05 DIAGNOSIS — E11.9 TYPE 2 DIABETES MELLITUS WITHOUT COMPLICATION, WITH LONG-TERM CURRENT USE OF INSULIN (HCC): Primary | ICD-10-CM

## 2022-05-05 DIAGNOSIS — J45.909 ACUTE ASTHMA: ICD-10-CM

## 2022-05-05 DIAGNOSIS — Z79.4 TYPE 2 DIABETES MELLITUS WITHOUT COMPLICATION, WITH LONG-TERM CURRENT USE OF INSULIN (HCC): Primary | ICD-10-CM

## 2022-05-05 DIAGNOSIS — D68.51 FACTOR 5 LEIDEN MUTATION, HETEROZYGOUS (HCC): ICD-10-CM

## 2022-05-05 PROCEDURE — 4004F PT TOBACCO SCREEN RCVD TLK: CPT | Performed by: INTERNAL MEDICINE

## 2022-05-05 PROCEDURE — 2022F DILAT RTA XM EVC RTNOPTHY: CPT | Performed by: INTERNAL MEDICINE

## 2022-05-05 PROCEDURE — G8427 DOCREV CUR MEDS BY ELIG CLIN: HCPCS | Performed by: INTERNAL MEDICINE

## 2022-05-05 PROCEDURE — 3017F COLORECTAL CA SCREEN DOC REV: CPT | Performed by: INTERNAL MEDICINE

## 2022-05-05 PROCEDURE — 3046F HEMOGLOBIN A1C LEVEL >9.0%: CPT | Performed by: INTERNAL MEDICINE

## 2022-05-05 PROCEDURE — G8421 BMI NOT CALCULATED: HCPCS | Performed by: INTERNAL MEDICINE

## 2022-05-05 PROCEDURE — 99214 OFFICE O/P EST MOD 30 MIN: CPT | Performed by: INTERNAL MEDICINE

## 2022-05-05 RX ORDER — INSULIN LISPRO 100 [IU]/ML
INJECTION, SOLUTION INTRAVENOUS; SUBCUTANEOUS
Qty: 4 PEN | Refills: 1 | Status: SHIPPED | OUTPATIENT
Start: 2022-05-05

## 2022-05-05 RX ORDER — ALBUTEROL SULFATE 90 UG/1
2 AEROSOL, METERED RESPIRATORY (INHALATION) EVERY 4 HOURS PRN
Qty: 3 EACH | Refills: 1 | Status: SHIPPED | OUTPATIENT
Start: 2022-05-05 | End: 2023-05-05

## 2022-05-05 RX ORDER — METOPROLOL SUCCINATE 100 MG/1
100 TABLET, EXTENDED RELEASE ORAL DAILY
Qty: 90 TABLET | Refills: 1 | Status: SHIPPED
Start: 2022-05-05 | End: 2022-11-01

## 2022-05-05 NOTE — PROGRESS NOTES
5/5/22    Larry Rashid, a female of 46 y.o. came to the office     Larry Rashid presents today for virtual visit for routine follow-up. She is concerned about her diabetes and her weight. She states that her glucose has been between 140 and 200. She takes 70/30 insulin 25 units twice a day.   She has not been using sliding scale      Patient Active Problem List   Diagnosis    Essential hypertension    Type 2 diabetes mellitus without complication, with long-term current use of insulin ([facility])    Class 3 severe obesity due to excess calories with serious comorbidity and body mass index (BMI) of 45.0 to 49.9 in adult [facility])    Other insomnia    Anxiety    Recurrent major depressive disorder, in remission ([facility] [address]    Idiopathic peripheral neuropathy    Herniated lumbar intervertebral disc    Lumbar spondylosis    Lumbar disc disorder    Lumbar radiculopathy    Chronic pain syndrome    Spinal stenosis of lumbar region without neurogenic claudication    Primary osteoarthritis of left hip    Factor 5 Leiden mutation, heterozygous ([facility])      Allergies   Allergen Reactions    Cefdinir      Facial erythema     Clemastine      Other reaction(s): Loose voice    Neurontin [Gabapentin]     Theophyllines     Toradol [Ketorolac Tromethamine]      Face erythema     Vicodin Hp [Hydrocodone-Acetaminophen]     Wellbutrin [Bupropion]     Imodium [Loperamide] Nausea And Vomiting     Current Outpatient Medications on File Prior to Visit   Medication Sig Dispense Refill    Insulin Aspart (NOVOLOG FLEXPEN SC) Inject into the skin 3 times daily 0units , 8units 131-180,12units 181-240 16units 241-300, 20units 301-350, 24units 351-400      atorvastatin (LIPITOR) 10 MG tablet TAKE 1 TABLET DAILY 90 tablet 1    lisinopril (PRINIVIL;ZESTRIL) 10 MG tablet Take 1 tablet by mouth daily 90 tablet 1    cetirizine (ZYRTEC) 10 MG tablet Take 1 tablet by mouth daily 90 tablet 1    buPROPion (WELLBUTRIN XL) 300 MG extended release tablet bupropion HCl  mg 24 hr tablet, extended release   TAKE ONE TABLET BY MOUTH EVERY MORNING      busPIRone (BUSPAR) 30 MG tablet TAKE ONE TABLET BY MOUTH TWO TIMES A DAY      Continuous Blood Gluc Sensor (FREESTYLE SHELLEY SENSOR SYSTEM) MISC 1 Device by Does not apply route daily 1 each 0    famotidine (PEPCID) 20 MG tablet Take 1 tablet by mouth 2 times daily 180 tablet 1    diphenhydrAMINE HCl (BENADRYL ALLERGY PO) Take by mouth As needed      Handicap Placard MISC by Does not apply route 1 each 0    loratadine (CLARITIN) 10 MG capsule Take 10 mg by mouth daily      pregabalin (LYRICA) 100 MG capsule Take 200 mg by mouth 2 times daily.  dicyclomine (BENTYL) 20 MG tablet Take 20 mg by mouth as needed (stomach pain)      ALPRAZolam (XANAX) 0.5 MG tablet Take 0.5 mg by mouth nightly. Take one tab as needed daily and one tab nightly.  DULoxetine (CYMBALTA) 60 MG extended release capsule Take 120 mg by mouth daily      aspirin 81 MG tablet Take 81 mg by mouth daily      pantoprazole (PROTONIX) 40 MG tablet Take 1 tablet by mouth 2 times daily (Patient not taking: Reported on 5/5/2022) 180 tablet 1    insulin glargine (LANTUS SOLOSTAR) 100 UNIT/ML injection pen Inject 30 Units into the skin nightly (Patient not taking: Reported on 5/5/2022) 15 pen 1    brexpiprazole (REXULTI) 1 MG TABS tablet Take 1 mg by mouth daily  (Patient not taking: Reported on 5/5/2022)       No current facility-administered medications on file prior to visit. Review of Systems  Constitutional:Negative for activity change, appetite change, chills, fatigue and fever. Respiratory: Negative for choking, chest tightness, shortness of breath and wheezing. Cardiovascular: Negative for chest pain, palpitations and leg swelling. Gastrointestinal: Negative for abdominal distention, constipation, diarrhea, nausea and vomiting.    Musculoskeletal: Negative for arthralgias, back pain, gait problem and joint swelling. Neurological: Negative for dizziness, weakness,numbness and headaches. There were no vitals taken for this visit. Physical Exam   Constitutional:  Oriented to person, place, and time. Appears well-developed and well-nourished. No acute distress. Neurological:Alert and oriented to person, place, and time. Skin: No diaphoresis. Psychiatric: Normal mood and affect. Behavior is Normal.     ASSESSMENT AND PLAN:    Dimple was seen today for 1 month follow-up. Diagnoses and all orders for this visit:    Type 2 diabetes mellitus without complication, with long-term current use of insulin (HCC)  -     Hemoglobin A1C; Future  -     CBC; Future  -     Comprehensive Metabolic Panel; Future  -     Lipid Panel; Future    Acute asthma  -     albuterol sulfate HFA (PROVENTIL HFA) 108 (90 Base) MCG/ACT inhaler; Inhale 2 puffs into the lungs every 4 hours as needed for Wheezing    Factor 5 Leiden mutation, heterozygous ([facility] [address]    Recurrent major depressive disorder, in remission ([facility] [address]    Other orders  -     insulin 70-30 (NOVOLIN 70/30) (70-30) 100 UNIT per ML injection vial; Inject 25 Units into the skin 2 times daily  -     metoprolol succinate (TOPROL XL) 100 MG extended release tablet; Take 1 tablet by mouth daily  -     insulin lispro, 1 Unit Dial, (HUMALOG KWIKPEN) 100 UNIT/ML SOPN; Take 3 times a day. Take 0 units for glucose between 70 to 130, 4 units for glucose between 131 to 180.  8 units for glucose between 181 to 240.  10 units for glucose between 241 to 300.  12 units for glucose between 301 to 350.  16 units for glucose between 351 to 400. Call physician if over 400. I will start her on medium dose sliding scale today. I will also obtain hemoglobin A1c testing. If her A1c is grossly uncontrolled I will augment her insulin regimen further and also refer her to bariatric surgery.   She should continue albuterol for asthma      Return in about 3 months (around 8/5/2022). Electronically signed by Elio Salazar DO on 5/5/2022 at 12:08 PM      TeleMedicine Patient Consent    This visit was performed as a virtual video visit using a synchronous, two-way, audio-video telehealth technology platform. Patient identification was verified at the start of the visit, including the patient's telephone number and physical location. I discussed with the patient the nature of our telehealth visits, that:     1. Due to the nature of an audio- video modality, the only components of a physical exam that could be done are the elements supported by direct observation. 2. I would evaluate the patient and recommend diagnostics and treatments based on my assessment. 3. If it was felt that the patient should be evaluated in clinic or an emergency room setting, then they would be directed there. 4. Our sessions are not being recorded and that personal health information is protected. 5. Our team would provide follow up care in person if/when the patient needs it. Patient Does agree to proceed with telemedicine consultation. Patient's location: home address in [place] [address]  location home address in [place]  Other people involved in call - None      Time spent: Not billed by time    This visit was completed virtually using D[employer]. me    Due to this being a TeleHealth encounter, evaluation of the following organ systems is limited: Vitals/Constitutional/EENT/Resp/CV/GI//MS/Neuro/Skin/Heme-Lymph-Imm. Pursuant to the emergency declaration under the [facility] waiver authority and the [employer] Act, this Virtual  Visit was conducted, with patient's consent, to reduce the patient's risk of exposure to COVID-19 and provide continuity of care for an established patient.     Services were provided through a video synchronous discussion virtually to substitute for in-person clinic visit. Please note that the above documentation was prepared using voice recognition software. Every attempt was made to ensure accuracy but there may be spelling, grammatical, and contextual errors. Electronically signed by Zamzam Gusman DO on 5/5/2022 at 12:08 PM    Aldo Altamirano, was evaluated through a synchronous (real-time (A/V encounter. The patient (or guardian if applicable (is aware that this is a billable service, which includes applicable co-pays. The virtual visit was conducted with patient's 9 and/or legal guardians (consent. The visit was conducted pursuant to the emergency declaration under the [employer] act and the [employer], [address] [place] [address] waiver authority and the coronavirus preparedness and response supplemental appropriations act. Patient identification was verified, and a caregiver was present when appropriate. The patient was located in a state where the provider was licensed to provide care.

## 2022-05-11 DIAGNOSIS — J45.909 ACUTE ASTHMA: Primary | ICD-10-CM

## 2022-05-12 RX ORDER — ALBUTEROL SULFATE 90 UG/1
2 AEROSOL, METERED RESPIRATORY (INHALATION) EVERY 6 HOURS PRN
Qty: 3 EACH | Refills: 3 | Status: SHIPPED | OUTPATIENT
Start: 2022-05-12

## 2022-06-22 DIAGNOSIS — J30.2 SEASONAL ALLERGIES: ICD-10-CM

## 2022-06-22 RX ORDER — CETIRIZINE HYDROCHLORIDE 10 MG/1
TABLET ORAL
Qty: 90 TABLET | Refills: 1 | Status: SHIPPED | OUTPATIENT
Start: 2022-06-22

## 2022-07-26 DIAGNOSIS — K21.00 GASTROESOPHAGEAL REFLUX DISEASE WITH ESOPHAGITIS WITHOUT HEMORRHAGE: ICD-10-CM

## 2022-07-26 RX ORDER — PANTOPRAZOLE SODIUM 40 MG/1
40 TABLET, DELAYED RELEASE ORAL 2 TIMES DAILY
Qty: 180 TABLET | Refills: 1 | Status: SHIPPED | OUTPATIENT
Start: 2022-07-26

## 2022-09-06 RX ORDER — LISINOPRIL 10 MG/1
TABLET ORAL
Qty: 90 TABLET | Refills: 1 | Status: SHIPPED | OUTPATIENT
Start: 2022-09-06

## 2022-09-13 ENCOUNTER — TELEPHONE (OUTPATIENT)
Dept: PRIMARY CARE CLINIC | Age: 52
End: 2022-09-13

## 2022-09-13 NOTE — TELEPHONE ENCOUNTER
----- Message from Karina Haynes DO sent at 9/13/2022  2:33 PM EDT -----  Can we make sure that she gets her blood work done?

## 2022-09-20 ENCOUNTER — OFFICE VISIT (OUTPATIENT)
Dept: PRIMARY CARE CLINIC | Age: 52
End: 2022-09-20
Payer: MEDICARE

## 2022-09-20 VITALS
HEART RATE: 84 BPM | OXYGEN SATURATION: 97 % | BODY MASS INDEX: 42.01 KG/M2 | DIASTOLIC BLOOD PRESSURE: 70 MMHG | HEIGHT: 60 IN | WEIGHT: 214 LBS | SYSTOLIC BLOOD PRESSURE: 110 MMHG | TEMPERATURE: 97.2 F

## 2022-09-20 DIAGNOSIS — E11.9 TYPE 2 DIABETES MELLITUS WITHOUT COMPLICATION, WITH LONG-TERM CURRENT USE OF INSULIN (HCC): Primary | ICD-10-CM

## 2022-09-20 DIAGNOSIS — Z00.00 INITIAL MEDICARE ANNUAL WELLNESS VISIT: ICD-10-CM

## 2022-09-20 DIAGNOSIS — Z79.4 TYPE 2 DIABETES MELLITUS WITHOUT COMPLICATION, WITH LONG-TERM CURRENT USE OF INSULIN (HCC): Primary | ICD-10-CM

## 2022-09-20 DIAGNOSIS — E66.01 OBESITY, CLASS III, BMI 40-49.9 (MORBID OBESITY) (HCC): ICD-10-CM

## 2022-09-20 DIAGNOSIS — Z12.31 BREAST CANCER SCREENING BY MAMMOGRAM: ICD-10-CM

## 2022-09-20 LAB — HBA1C MFR BLD: 10.3 %

## 2022-09-20 PROCEDURE — 3017F COLORECTAL CA SCREEN DOC REV: CPT | Performed by: INTERNAL MEDICINE

## 2022-09-20 PROCEDURE — G0438 PPPS, INITIAL VISIT: HCPCS | Performed by: INTERNAL MEDICINE

## 2022-09-20 PROCEDURE — 83036 HEMOGLOBIN GLYCOSYLATED A1C: CPT | Performed by: INTERNAL MEDICINE

## 2022-09-20 PROCEDURE — 3046F HEMOGLOBIN A1C LEVEL >9.0%: CPT | Performed by: INTERNAL MEDICINE

## 2022-09-20 ASSESSMENT — LIFESTYLE VARIABLES
HOW MANY STANDARD DRINKS CONTAINING ALCOHOL DO YOU HAVE ON A TYPICAL DAY: PATIENT DOES NOT DRINK
HOW OFTEN DO YOU HAVE A DRINK CONTAINING ALCOHOL: NEVER

## 2022-09-20 ASSESSMENT — PATIENT HEALTH QUESTIONNAIRE - PHQ9
2. FEELING DOWN, DEPRESSED OR HOPELESS: 1
8. MOVING OR SPEAKING SO SLOWLY THAT OTHER PEOPLE COULD HAVE NOTICED. OR THE OPPOSITE, BEING SO FIGETY OR RESTLESS THAT YOU HAVE BEEN MOVING AROUND A LOT MORE THAN USUAL: 0
3. TROUBLE FALLING OR STAYING ASLEEP: 0
SUM OF ALL RESPONSES TO PHQ QUESTIONS 1-9: 1
SUM OF ALL RESPONSES TO PHQ9 QUESTIONS 1 & 2: 1
7. TROUBLE CONCENTRATING ON THINGS, SUCH AS READING THE NEWSPAPER OR WATCHING TELEVISION: 0
6. FEELING BAD ABOUT YOURSELF - OR THAT YOU ARE A FAILURE OR HAVE LET YOURSELF OR YOUR FAMILY DOWN: 0
10. IF YOU CHECKED OFF ANY PROBLEMS, HOW DIFFICULT HAVE THESE PROBLEMS MADE IT FOR YOU TO DO YOUR WORK, TAKE CARE OF THINGS AT HOME, OR GET ALONG WITH OTHER PEOPLE: 0
1. LITTLE INTEREST OR PLEASURE IN DOING THINGS: 0
SUM OF ALL RESPONSES TO PHQ QUESTIONS 1-9: 1
9. THOUGHTS THAT YOU WOULD BE BETTER OFF DEAD, OR OF HURTING YOURSELF: 0
SUM OF ALL RESPONSES TO PHQ QUESTIONS 1-9: 1
5. POOR APPETITE OR OVEREATING: 0
SUM OF ALL RESPONSES TO PHQ QUESTIONS 1-9: 1
4. FEELING TIRED OR HAVING LITTLE ENERGY: 0

## 2022-09-20 NOTE — PROGRESS NOTES
Medicare Annual Wellness Visit    Kaveh Cabrales is here for Medicare AWV    Assessment & Plan   Type 2 diabetes mellitus without complication, with long-term current use of insulin (HCC)  -     POCT glycosylated hemoglobin (Hb A1C)  Initial Medicare annual wellness visit  Breast cancer screening by mammogram  -     [facility] DIGITAL SCREEN W OR WO CAD BILATERAL; Future  Obesity, Class III, BMI 40-49.9 (morbid obesity) ([address]    Recommendations for Preventive Services Due: see orders and patient instructions/AVS.  Recommended screening schedule for the next 5-10 years is provided to the patient in written form: see Patient Instructions/AVS.     Return in 6 weeks (on 11/1/2022) for Medicare Annual Wellness Visit in 1 year, Colonoscopy - please obtain outside colonoscopy. Subjective   The following acute and/or chronic problems were also addressed today:  See below      Patient's complete Health Risk Assessment and screening values have been reviewed and are found in Flowsheets. The following problems were reviewed today and where indicated follow up appointments were made and/or referrals ordered.     Positive Risk Factor Screenings with Interventions:    Fall Risk:  Do you feel unsteady or are you worried about falling? : (!) yes  2 or more falls in past year?: no  Fall with injury in past year?: no   Fall Risk Interventions:    Home safety tips provided            General Health and ACP:  General  In general, how would you say your health is?: Fair  In the past 7 days, have you experienced any of the following: New or Increased Pain, New or Increased Fatigue, Loneliness, Social Isolation, Stress or Anger?: No  Do you get the social and emotional support that you need?: Yes  Do you have a Living Will?: (!) No    Advance Directives       Power of  Living Will ACP-Advance Directive ACP-Power of     Not on File Not on File Not on File Not on File        General Health Risk Interventions:  No Living Will: Advance Care Planning addressed with patient today    Health Habits/Nutrition:  Physical Activity: Inactive    Days of Exercise per Week: 0 days    Minutes of Exercise per Session: 0 min     Have you lost any weight without trying in the past 3 months?: No  Body mass index: (!) 41.79  Have you seen the dentist within the past year?: (!) No  Health Habits/Nutrition Interventions:  Nutritional issues:  educational materials for healthy, well-balanced diet provided  Dental exam overdue:  patient encouraged to make appointment with his/her dentist             Objective   Vitals:    09/20/22 1352   BP: 110/70   Pulse: 84   Temp: 97.2 °F (36.2 °C)   SpO2: 97%   Weight: 214 lb (97.1 kg)   Height: 5' (1.524 m)      Body mass index is 41.79 kg/m².       General Appearance: alert and oriented to person, place and time, well developed and well- nourished, in no acute distress  Skin: warm and dry, no rash or erythema  Head: normocephalic and atraumatic  Eyes: pupils equal, round, and reactive to light, extraocular eye movements intact, conjunctivae normal  ENT: tympanic membrane, external ear and ear canal normal bilaterally, nose without deformity, nasal mucosa and turbinates normal without polyps  Neck: supple and non-tender without mass, no thyromegaly or thyroid nodules, no cervical lymphadenopathy  Pulmonary/Chest: clear to auscultation bilaterally- no wheezes, rales or rhonchi, normal air movement, no respiratory distress  Cardiovascular: normal rate, regular rhythm, normal S1 and S2, no murmurs, rubs, clicks, or gallops, distal pulses intact, no carotid bruits  Abdomen: soft, non-tender, non-distended, normal bowel sounds, no masses or organomegaly  Extremities: no cyanosis, clubbing or edema  Musculoskeletal: normal range of motion, no joint swelling, deformity or tenderness  Neurologic: reflexes normal and symmetric, no cranial nerve deficit, gait, coordination and speech normal       Allergies   Allergen DO Alejandro   buPROPion (WELLBUTRIN XL) 300 MG extended release tablet bupropion HCl  mg 24 hr tablet, extended release   TAKE ONE TABLET BY MOUTH EVERY MORNING Yes Historical Provider, MD   busPIRone (BUSPAR) 30 MG tablet TAKE ONE TABLET BY MOUTH TWO TIMES A DAY Yes Historical Provider, MD   insulin glargine (LANTUS SOLOSTAR) 100 UNIT/ML injection pen Inject 30 Units into the skin nightly Yes Lani Allen DO   Continuous Blood Gluc Sensor (FREESTYLE SHELLEY SENSOR SYSTEM) MISC 1 Device by Does not apply route daily Yes Lani Allen DO   famotidine (PEPCID) 20 MG tablet Take 1 tablet by mouth 2 times daily Yes Lani Allen DO   diphenhydrAMINE HCl (BENADRYL ALLERGY PO) Take by mouth As needed Yes Historical Provider, MD   Handicap Placard MISC by Does not apply route Yes Lani Allen DO   loratadine (CLARITIN) 10 MG capsule Take 10 mg by mouth daily Yes Historical Provider, MD   pregabalin (LYRICA) 100 MG capsule Take 200 mg by mouth 2 times daily. Yes Historical Provider, MD   dicyclomine (BENTYL) 20 MG tablet Take 20 mg by mouth as needed (stomach pain) Yes Historical Provider, MD   ALPRAZolam (XANAX) 0.5 MG tablet Take 0.5 mg by mouth nightly. Take one tab as needed daily and one tab nightly.  Yes Historical Provider, MD   DULoxetine (CYMBALTA) 60 MG extended release capsule Take 120 mg by mouth daily Yes Historical Provider, MD   aspirin 81 MG tablet Take 81 mg by mouth daily Yes Historical Provider, MD   brexpiprazole (REXULTI) 1 MG TABS tablet Take 1 mg by mouth daily Yes Historical Provider, MD Venegas (Including outside providers/suppliers regularly involved in providing care):   Patient Care Team:  Jessika Pierre DO as PCP - General (Internal Medicine)  Jessika Pierre DO as PCP - [facility] Empaneled Provider     Reviewed and updated this visit:  Tobacco  Allergies  Meds  Med Hx  Surg Hx  Soc Hx  Fam Hx          At last appointment she was started on medium dose sliding scale. She follows with counseling at [facility] Dr. Sebastian Morton (Endo)    She was unable to get the short acting insulin. Her glucose is between 200-450. She is taking 25 U of long acting insulin.        Plan  Increase long acting insulin to 35 BID  Following with endocrinology   Unable to afford short acting insulin  Request outside colonoscopy records  Obtain mammogram  Advised to obtain a dentist

## 2022-09-20 NOTE — PATIENT INSTRUCTIONS
Patient Education        Learning About Low-Carbohydrate Diets  What is a low-carbohydrate diet? A low-carbohydrate (or \"low-carb\") diet limits foods and drinks that have carbohydrates. This includes grains, fruits, milk and yogurt, and starchy vegetables like potatoes, beans, and corn. It also avoids foods and drinks that have added sugar. Instead, low-carb diets include foods that are high in protein and fat. Why might you follow a low-carb diet? Low-carb diets may be used for a variety of reasons, such as for weight loss. People who have diabetes may use a low-carb diet to help manage their blood sugar levels. What should you do before you start the diet? Talk to your doctor before you try any diet. This is even more important if you have health problems like kidney disease, heart disease, or diabetes. Your doctor may suggest that you meet with a registered dietitian. A dietitian can help you make an eating plan that works for you. What foods do you eat on a low-carb diet? On a low-carb diet, you choose foods that are high in protein and fat. Examples of these are:  Meat, poultry, and fish. Eggs. Nuts, such as walnuts, pecans, almonds, and peanuts. Peanut butter and other nut butters. Tofu. Avocado. [place]. Non-starchy vegetables like broccoli, cauliflower, green beans, mushrooms, peppers, lettuce, and spinach. Unsweetened non-dairy milks like almond milk and coconut milk. Cheese, cottage cheese, and cream cheese. Current as of: December 17, 2020               Content Version: 12.8  © 2006-2021 [employer]. Care instructions adapted under license by [employer]. If you have questions about a medical condition or this instruction, always ask your healthcare professional. [place] [address] any warranty or liability for your use of this information.          Patient Education        Eating Healthy Foods: Care Instructions  Your Care Instructions     Eating healthy foods can help lower your risk for disease. Healthy food gives you energy and keeps your heart strong, your brain active, your muscles working, and your bones strong. A healthy diet includes a variety of foods from the basic food groups: grains, vegetables, fruits, milk and milk products, and meat and beans. Some people may eat more of their favorite foods from only one food group and, as a result, miss getting the nutrients they need. So, it is important to pay attention not only to what you eat but also to what you are missing from your diet. You can eat a healthy, balanced diet by making a few small changes. Follow-up care is a key part of your treatment and safety. Be sure to make and go to all appointments, and call your doctor if you are having problems. It's also a good idea to know your test results and keep a list of the medicines you take. How can you care for yourself at home? Look at what you eat  Keep a food diary for a week or two and record everything you eat or drink. Track the number of servings you eat from each food group. For a balanced diet every day, eat a variety of:  6 or more ounce-equivalents of grains, such as cereals, breads, crackers, rice, or pasta, every day. An ounce-equivalent is 1 slice of bread, 1 cup of ready-to-eat cereal, or ½ cup of cooked rice, cooked pasta, or cooked cereal.  2½ cups of vegetables, especially:  Dark-green vegetables such as broccoli and spinach. Orange vegetables such as carrots and sweet potatoes. Dry beans (such as aviles and kidney beans) and peas (such as lentils). 2 cups of fresh, frozen, or canned fruit. A small apple or 1 banana or orange equals 1 cup.  3 cups of nonfat or low-fat milk, yogurt, or other milk products. 5½ ounces of meat and beans, such as chicken, fish, lean meat, beans, nuts, and seeds. One egg, 1 tablespoon of peanut butter, ½ ounce nuts or seeds, or ¼ cup of cooked beans equals 1 ounce of meat.   Learn how to read food labels for serving sizes and ingredients. Fast-food and convenience-food meals often contain few or no fruits or vegetables. Make sure you eat some fruits and vegetables to make the meal more nutritious. Look at your food diary. For each food group, add up what you have eaten and then divide the total by the number of days. This will give you an idea of how much you are eating from each food group. See if you can find some ways to change your diet to make it more healthy. Start small  Do not try to make dramatic changes to your diet all at once. You might feel that you are missing out on your favorite foods and then be more likely to fail. Start slowly, and gradually change your habits. Try some of the following:  Use whole wheat bread instead of white bread. Use nonfat or low-fat milk instead of whole milk. Eat brown rice instead of white rice, and eat whole wheat pasta instead of white-flour pasta. Try low-fat cheeses and low-fat yogurt. Add more fruits and vegetables to meals and have them for snacks. Add lettuce, tomato, cucumber, and onion to sandwiches. Add fruit to yogurt and cereal.  Enjoy food  You can still eat your favorite foods. You just may need to eat less of them. If your favorite foods are high in fat, salt, and sugar, limit how often you eat them, but do not cut them out entirely. Eat a wide variety of foods. Make healthy choices when eating out  The type of restaurant you choose can help you make healthy choices. Even fast-food chains are now offering more low-fat or healthier choices on the menu. Choose smaller portions, or take half of your meal home. When eating out, try:  A veggie pizza with a whole wheat crust or grilled chicken (instead of sausage or pepperoni). Pasta with roasted vegetables, grilled chicken, or marinara sauce instead of cream sauce. A vegetable wrap or grilled chicken wrap. Broiled or poached food instead of fried or breaded items.   Make healthy choices easy  Buy packaged, prewashed, ready-to-eat fresh vegetables and fruits, such as baby carrots, salad mixes, and chopped or shredded broccoli and cauliflower. Buy packaged, presliced fruits, such as melon or pineapple. Choose 100% fruit or vegetable juice instead of soda. Limit juice intake to 4 to 6 oz (½ to ¾ cup) a day. Blend low-fat yogurt, fruit juice, and canned or frozen fruit to make a smoothie for breakfast or a snack. Where can you learn more? Go to https://[employer].pe[employer]rich [employer] ProductionsewCountercepts.Epy.io. org and sign in to your [employer] account. Enter V696 in the Bindo box to learn more about \"Eating Healthy Foods: Care Instructions. \"     If you do not have an account, please click on the \"Sign Up Now\" link. Current as of: December 17, 2020               Content Version: 12.8  © 2006-2021 [email or web address]. Care instructions adapted under license by [facility] ([facility]). If you have questions about a medical condition or this instruction, always ask your healthcare professional. [place] [address] any warranty or liability for your use of this information. Patient Education        Learning About Healthy Weight  What is a healthy weight? A healthy weight is the weight at which you feel good about yourself and have energy for work and play. It's also one that lowers your risk for health problems. What can you do to stay at a healthy weight? It can be hard to stay at a healthy weight, especially when fast food, vending-machine snacks, and processed foods are so easy to find. And with your busy lifestyle, activity may be low on your list of things to do. But staying at a healthy weight may be easier than you think. Here are some dos and don'ts for staying at a healthy weight. Do eat healthy foods  The kinds of foods you eat have a big impact on both your weight and your health. Reaching and staying at a healthy weight is not about going on a diet.  It's about making healthier food choices every day and changing your diet for good. Healthy eating means eating a variety of foods so that you get all the nutrients you need. Your body needs protein, carbohydrate, and fats for energy. They keep your heart beating, your brain active, and your muscles working. On most days, try to eat from each food group. This means eating a variety of: Whole grains, such as whole wheat breads and pastas. Fruits and vegetables. Dairy products, such as low-fat milk, yogurt, and cheese. Lean proteins, such as all types of fish, chicken without the skin, and beans. Don't have too much or too little of one thing. All foods, if eaten in moderation, can be part of healthy eating. Even sweets can be okay. If your favorite foods are high in fat, salt, sugar, or calories, limit how often you eat them. Eat smaller servings, or look for healthy substitutes. Do watch what you eat  Many people eat more than their bodies need. Part of staying at a healthy weight means learning how much food you really need from day to day and not eating more than that. Even with healthy foods, eating too much can make you gain weight. Having a well-balanced diet means that you eat enough, but not too much, and that your food gives you the nutrients you need to stay healthy. So listen to your body. Eat when you're hungry. Stop when you feel satisfied. It's a good idea to have healthy snacks ready for when you get hungry. Keep healthy snacks with you at work, in your car, and at home. If you have a healthy snack easily available, you'll be less likely to pick a candy bar or bag of chips from a vending machine instead. Some healthy snacks you might want to keep on hand are fruit, low-fat yogurt, string cheese, low-fat microwave popcorn, raisins and other dried fruit, nuts, whole wheat crackers, pretzels, carrots, celery sticks, and broccoli.   Do some physical activity  A big part of reaching and staying at a healthy weight is being active. When you're active, you burn calories. This makes it easier to reach and stay at a healthy weight. When you're active on a regular basis, your body burns more calories, even when you're at rest. Being active helps you lose fat and build lean muscle. Try to be active for at least 1 hour every day. This may sound like a lot, but it's okay to be active in smaller blocks of time that add up to 1 hour a day. Any activity that makes your heart beat faster and keeps it there for a while counts. A brisk walk, run, or swim will get your heart beating faster. So will climbing stairs, shooting baskets, or cycling. Even some household chores like vacuuming and mowing the lawn will get your heart rate up. Pick activities that you enjoy--ones that make your heart beat faster, your muscles stronger, and your muscles and joints more flexible. If you find more than one thing you like doing, do them all. You don't have to do the same thing every day. Don't diet  Diets don't work. Diets are temporary. Because you give up so much when you diet, you may be hungry and think about food all the time. And after you stop dieting, you also may overeat to make up for what you missed. Most people who diet end up gaining back the pounds they lost--and more. Remember that healthy bodies come in lots of shapes and sizes. Everyone can get healthier by eating better and being more active. Where can you learn more? Go to https://Credivalores-Crediserviciosbc.Flaconi. org and sign in to your [employer] account. Enter 667 2771 in the DigitalGlobe box to learn more about \"Learning About Healthy Weight. \"     If you do not have an account, please click on the \"Sign Up Now\" link. Current as of: September 23, 2020               Content Version: 12.8  © 3183-1736 Healthwise, Incorporated. Care instructions adapted under license by [facility] ([facility]).  If you have questions about a medical condition or this instruction, always ask your healthcare professional. [place] [address] any warranty or liability for your use of this information. Preventing Falls: Care Instructions  Your Care Instructions     Getting around your home safely can be a challenge if you have injuries or health problems that make it easy for you to fall. Loose rugs and furniture in walkways are among the dangers for many older people who have problems walking or who have poor eyesight. People who have conditions such as arthritis, osteoporosis, or dementia also have to be careful not to fall. You can make your home safer with a few simple measures. Follow-up care is a key part of your treatment and safety. Be sure to make and go to all appointments, and call your doctor if you are having problems. It's also a good idea to know your test results and keep a list of the medicines you take. How can you care for yourself at home? Taking care of yourself  You may get dizzy if you do not drink enough water. To prevent dehydration, drink plenty of fluids. Choose water and other caffeine-free clear liquids. If you have kidney, heart, or liver disease and have to limit fluids, talk with your doctor before you increase the amount of fluids you drink. Exercise regularly to improve your strength, muscle tone, and balance. Walk if you can. Swimming may be a good choice if you cannot walk easily. Have your vision and hearing checked each year or any time you notice a change. If you have trouble seeing and hearing, you might not be able to avoid objects and could lose your balance. Know the side effects of the medicines you take. Ask your doctor or pharmacist whether the medicines you take can affect your balance. Sleeping pills or sedatives can affect your balance. Limit the amount of alcohol you drink. Alcohol can impair your balance and other senses. Ask your doctor whether calluses or corns on your feet need to be removed.  If you wear loose-fitting shoes because of calluses or corns, you can lose your balance and fall. Talk to your doctor if you have numbness in your feet. Preventing falls at home  Remove raised doorway thresholds, throw rugs, and clutter. Repair loose carpet or raised areas in the floor. Move furniture and electrical cords to keep them out of walking paths. Use nonskid floor wax, and wipe up spills right away, especially on ceramic tile floors. If you use a walker or cane, put rubber tips on it. If you use crutches, clean the bottoms of them regularly with an abrasive pad, such as steel wool. Keep your house well lit, especially stairways, porches, and outside walkways. Use night-lights in areas such as hallways and bathrooms. Add extra light switches or use remote switches (such as switches that go on or off when you clap your hands) to make it easier to turn lights on if you have to get up during the night. Install sturdy handrails on stairways. Move items in your cabinets so that the things you use a lot are on the lower shelves (about waist level). Keep a cordless phone and a flashlight with new batteries by your bed. If possible, put a phone in each of the main rooms of your house, or carry a cell phone in case you fall and cannot reach a phone. Or, you can wear a device around your neck or wrist. You push a button that sends a signal for help. Wear low-heeled shoes that fit well and give your feet good support. Use footwear with nonskid soles. Check the heels and soles of your shoes for wear. Repair or replace worn heels or soles. Do not wear socks without shoes on wood floors. Walk on the grass when the sidewalks are slippery. If you live in an area that gets snow and ice in the winter, sprinkle salt on slippery steps and sidewalks. Preventing falls in the bath  Install grab bars and nonskid mats inside and outside your shower or tub and near the toilet and sinks. Use shower chairs and bath benches.   Use a hand-held shower head that will allow you to sit while showering. Get into a tub or shower by putting the weaker leg in first. Get out of a tub or shower with your strong side first.  Repair loose toilet seats and consider installing a raised toilet seat to make getting on and off the toilet easier. Keep your bathroom door unlocked while you are in the shower. Where can you learn more? Go to https://chpepiceweb.Oxtex. org and sign in to your [employer] account. Enter [id number] [id number] in the SnowBall box to learn more about \"Preventing Falls: Care Instructions. \"     If you do not have an account, please click on the \"Sign Up Now\" link. Current as of: December 7, 2020               Content Version: 12.8  © 3905-1645 Healthwise, Incorporated. Care instructions adapted under license by [facility] ([facility]). If you have questions about a medical condition or this instruction, always ask your healthcare professional. [place] [address] any warranty or liability for your use of this information. Personalized Preventive Plan for Lila Benjamin - 9/20/2022  Medicare offers a range of preventive health benefits. Some of the tests and screenings are paid in full while other may be subject to a deductible, co-insurance, and/or copay. Some of these benefits include a comprehensive review of your medical history including lifestyle, illnesses that may run in your family, and various assessments and screenings as appropriate. After reviewing your medical record and screening and assessments performed today your provider may have ordered immunizations, labs, imaging, and/or referrals for you. A list of these orders (if applicable) as well as your Preventive Care list are included within your After Visit Summary for your review.     Other Preventive Recommendations:    A preventive eye exam performed by an eye specialist is recommended every 1-2 years to screen for glaucoma; cataracts, macular degeneration, and other

## 2022-11-01 DIAGNOSIS — E78.00 ELEVATED CHOLESTEROL: ICD-10-CM

## 2022-11-01 RX ORDER — ATORVASTATIN CALCIUM 10 MG/1
TABLET, FILM COATED ORAL
Qty: 90 TABLET | Refills: 1 | Status: SHIPPED | OUTPATIENT
Start: 2022-11-01

## 2022-11-01 RX ORDER — METOPROLOL SUCCINATE 100 MG/1
TABLET, EXTENDED RELEASE ORAL
Qty: 90 TABLET | Refills: 1 | Status: SHIPPED | OUTPATIENT
Start: 2022-11-01

## 2022-11-09 LAB
BASOPHILS ABSOLUTE: 0.2 /ΜL
BASOPHILS RELATIVE PERCENT: 1.2 %
BUN BLDV-MCNC: 6 MG/DL
CALCIUM SERPL-MCNC: 9.7 MG/DL
CHLORIDE BLD-SCNC: 92 MMOL/L
CO2: 26 MMOL/L
CREAT SERPL-MCNC: 0.4 MG/DL
EOSINOPHILS ABSOLUTE: 0.6 /ΜL
EOSINOPHILS RELATIVE PERCENT: 1.8 %
GFR CALCULATED: >=60
GLUCOSE BLD-MCNC: 347 MG/DL
HCT VFR BLD CALC: 41.1 % (ref 36–46)
HEMOGLOBIN: 13.9 G/DL (ref 12–16)
LYMPHOCYTES ABSOLUTE: 4.5 /ΜL
LYMPHOCYTES RELATIVE PERCENT: 40.1 %
MCH RBC QN AUTO: 28.2 PG
MCHC RBC AUTO-ENTMCNC: 33.8 G/DL
MCV RBC AUTO: 83.5 FL
MONOCYTES ABSOLUTE: 3.5 /ΜL
MONOCYTES RELATIVE PERCENT: 6.5 %
NEUTROPHILS ABSOLUTE: 1.2 /ΜL
NEUTROPHILS RELATIVE PERCENT: 50.4 %
PDW BLD-RTO: 13.2 %
PLATELET # BLD: 253 K/ΜL
PMV BLD AUTO: 7.2 FL
POTASSIUM SERPL-SCNC: 4.5 MMOL/L
RBC # BLD: 4.93 10^6/ΜL
SODIUM BLD-SCNC: 129 MMOL/L
WBC # BLD: 8.8 10^3/ML

## 2022-11-23 ENCOUNTER — OFFICE VISIT (OUTPATIENT)
Dept: ENDOCRINOLOGY | Age: 52
End: 2022-11-23
Payer: MEDICARE

## 2022-11-23 VITALS
SYSTOLIC BLOOD PRESSURE: 104 MMHG | OXYGEN SATURATION: 99 % | WEIGHT: 220 LBS | HEIGHT: 60 IN | DIASTOLIC BLOOD PRESSURE: 70 MMHG | BODY MASS INDEX: 43.19 KG/M2 | HEART RATE: 78 BPM

## 2022-11-23 DIAGNOSIS — E78.2 MIXED HYPERLIPIDEMIA: ICD-10-CM

## 2022-11-23 DIAGNOSIS — E66.01 CLASS 3 SEVERE OBESITY DUE TO EXCESS CALORIES WITHOUT SERIOUS COMORBIDITY WITH BODY MASS INDEX (BMI) OF 40.0 TO 44.9 IN ADULT (HCC): ICD-10-CM

## 2022-11-23 DIAGNOSIS — E11.65 UNCONTROLLED TYPE 2 DIABETES MELLITUS WITH HYPERGLYCEMIA (HCC): Primary | ICD-10-CM

## 2022-11-23 DIAGNOSIS — E11.42 TYPE 2 DIABETES MELLITUS WITH POLYNEUROPATHY (HCC): ICD-10-CM

## 2022-11-23 PROCEDURE — G8484 FLU IMMUNIZE NO ADMIN: HCPCS | Performed by: CLINICAL NURSE SPECIALIST

## 2022-11-23 PROCEDURE — 3017F COLORECTAL CA SCREEN DOC REV: CPT | Performed by: CLINICAL NURSE SPECIALIST

## 2022-11-23 PROCEDURE — 99205 OFFICE O/P NEW HI 60 MIN: CPT | Performed by: CLINICAL NURSE SPECIALIST

## 2022-11-23 PROCEDURE — 95251 CONT GLUC MNTR ANALYSIS I&R: CPT | Performed by: CLINICAL NURSE SPECIALIST

## 2022-11-23 PROCEDURE — 2022F DILAT RTA XM EVC RTNOPTHY: CPT | Performed by: CLINICAL NURSE SPECIALIST

## 2022-11-23 PROCEDURE — 3078F DIAST BP <80 MM HG: CPT | Performed by: CLINICAL NURSE SPECIALIST

## 2022-11-23 PROCEDURE — 3074F SYST BP LT 130 MM HG: CPT | Performed by: CLINICAL NURSE SPECIALIST

## 2022-11-23 PROCEDURE — G8427 DOCREV CUR MEDS BY ELIG CLIN: HCPCS | Performed by: CLINICAL NURSE SPECIALIST

## 2022-11-23 PROCEDURE — 3046F HEMOGLOBIN A1C LEVEL >9.0%: CPT | Performed by: CLINICAL NURSE SPECIALIST

## 2022-11-23 PROCEDURE — 1036F TOBACCO NON-USER: CPT | Performed by: CLINICAL NURSE SPECIALIST

## 2022-11-23 PROCEDURE — G8417 CALC BMI ABV UP PARAM F/U: HCPCS | Performed by: CLINICAL NURSE SPECIALIST

## 2022-11-23 RX ORDER — BLOOD SUGAR DIAGNOSTIC
STRIP MISCELLANEOUS
Qty: 100 EACH | Refills: 3 | Status: SHIPPED | OUTPATIENT
Start: 2022-11-23

## 2022-11-23 RX ORDER — METFORMIN HYDROCHLORIDE 500 MG/1
500 TABLET, EXTENDED RELEASE ORAL
Qty: 30 TABLET | Refills: 5 | Status: SHIPPED | OUTPATIENT
Start: 2022-11-23

## 2022-11-23 RX ORDER — DULAGLUTIDE 1.5 MG/.5ML
1.5 INJECTION, SOLUTION SUBCUTANEOUS WEEKLY
Qty: 12 ADJUSTABLE DOSE PRE-FILLED PEN SYRINGE | Refills: 4 | Status: SHIPPED | OUTPATIENT
Start: 2022-11-23

## 2022-11-23 NOTE — PROGRESS NOTES
[address] [place] [address] Department of Endocrinology Diabetes and Metabolism   [address] [facility] [address]   Phone: [phone]  Fax: [phone]    Date of Service: 11/23/2022    Primary Care Physician: Marquez Aaron DO  Referring physician: Kellie Mittal DO  Provider: BREANNA Ren     Reason for the visit:  Type 2 diabetes      History of Present Illness: The history is provided by the patient. No  was used. Accuracy of the patient data is excellent. Gus Luu is a very pleasant 45 y.o. female seen today for diabetes management     Gus Luu was diagnosed with diabetes dx several years ago  and currently on Novolin 70/30 35 units BID  Metformin caused GI upset at higher doses   The patient has been checking blood sugar 2 times per day   Uses shiraz   Average    100% hyperglycemia  .     Most recent A1c results summarized below  Lab Results   Component Value Date/Time    LABA1C 10.3 09/20/2022 02:08 PM    LABA1C 10.6 03/04/2020 11:53 AM     Patient has had no hypoglycemic episodes   The patient hasn't been mindful of what has been eating and wasn't following diabetes diet    I reviewed current medications and the patient has no issues with diabetes medications  Gus Luu is up to date with eye exam and denied any history of diabetic retinopathy   also performs  own feet care  Microvascular complications:  No Retinopathy, Nephropathy or + Neuropathy   Macrovascular complications: no CAD, PVD, or Stroke  No HX of pancreatitis  No Hx of MTC  No HX of gastroparesis   No HX of UTI/Mycotic infection         PAST MEDICAL HISTORY   Past Medical History:   Diagnosis Date    Asthma     Cirrhosis of liver ([address]     DM (diabetes mellitus) ([address]     GERD (gastroesophageal reflux disease)     Hypertension     IBS (irritable bowel syndrome)     OCD (obsessive compulsive disorder)     Sleep apnea     Stomach cancer ([address]        PAST SURGICAL HISTORY   Past Surgical History:   Procedure Laterality Date    COLONOSCOPY      EPIDURAL STEROID INJECTION Bilateral 10/3/2019    BILATERAL L5 - S1 TRANSFORAMINAL EPIDURAL STEROID INJECTION performed by Fracisco Bird MD at [facility]  Bilateral 10/03/2019    BILATERAL L5-S1 TRANSFORAMINAL EPIDURAL STEROID INJECTION    UPPER GASTROINTESTINAL ENDOSCOPY      stomach cancer removed       SOCIAL HISTORY   Tobacco:   reports that she has never smoked. She has never used smokeless tobacco.  Alcohol:   reports no history of alcohol use. Drugs:   reports no history of drug use.     FAMILY HISTORY   Family History   Problem Relation Age of Onset    Cancer Other     Depression Other     Diabetes Other     Heart Disease Other     Mental Illness Other     Stroke Other        ALLERGIES AND DRUG REACTIONS   Allergies   Allergen Reactions    Cefdinir      Facial erythema     Clemastine      Other reaction(s): Loose voice    Neurontin [Gabapentin]     Theophyllines     Toradol [Ketorolac Tromethamine]      Face erythema     Vicodin Hp [Hydrocodone-Acetaminophen]     Wellbutrin [Bupropion]     Imodium [Loperamide] Nausea And Vomiting       CURRENT MEDICATIONS   Current Outpatient Medications   Medication Sig Dispense Refill    insulin 70-30 (NOVOLIN 70/30) (70-30) 100 UNIT per ML injection vial Inject 40 units before Bf and dinner 3 each 5    dulaglutide (TRULICITY) 1.5 PH/7.3XW SC injection Inject 0.5 mLs into the skin once a week 12 Adjustable Dose Pre-filled Pen Syringe 4    metFORMIN (GLUCOPHAGE-XR) 500 MG extended release tablet Take 1 tablet by mouth daily (with breakfast) 30 tablet 5    Insulin Syringe-Needle U-100 ([employer] INSULIN SYRINGE) 31G X 5/16\" 1 ML MISC 2 times per day 100 each 3    metoprolol succinate (TOPROL XL) 100 MG extended release tablet TAKE 1 TABLET EVERY DAY 90 tablet 1    atorvastatin (LIPITOR) 10 MG tablet TAKE 1 TABLET EVERY DAY 90 tablet 1    lisinopril (PRINIVIL;ZESTRIL) 10 MG tablet TAKE 1 TABLET EVERY DAY 90 tablet 1    pantoprazole (PROTONIX) 40 MG tablet Take 1 tablet by mouth in the morning and 1 tablet before bedtime. 180 tablet 1    cetirizine (ZYRTEC) 10 MG tablet TAKE 1 TABLET EVERY DAY 90 tablet 1    albuterol sulfate HFA (VENTOLIN HFA) 108 (90 Base) MCG/ACT inhaler Inhale 2 puffs into the lungs every 6 hours as needed for Wheezing 3 each 3    albuterol sulfate HFA (PROVENTIL HFA) 108 (90 Base) MCG/ACT inhaler Inhale 2 puffs into the lungs every 4 hours as needed for Wheezing 3 each 1    Insulin Aspart (NOVOLOG FLEXPEN SC) Inject into the skin 3 times daily 0units , 8units 131-180,12units 181-240 16units 241-300, 20units 301-350, 24units 351-400 (Patient not taking: Reported on 11/23/2022)      buPROPion (WELLBUTRIN XL) 300 MG extended release tablet bupropion HCl  mg 24 hr tablet, extended release   TAKE ONE TABLET BY MOUTH EVERY MORNING      busPIRone (BUSPAR) 30 MG tablet TAKE ONE TABLET BY MOUTH TWO TIMES A DAY      Continuous Blood Gluc Sensor ([place] SHELLEY SENSOR SYSTEM) MISC 1 Device by Does not apply route daily 1 each 0    famotidine (PEPCID) 20 MG tablet Take 1 tablet by mouth 2 times daily 180 tablet 1    diphenhydrAMINE HCl (BENADRYL ALLERGY PO) Take by mouth As needed      Handicap Placard MISC by Does not apply route 1 each 0    loratadine (CLARITIN) 10 MG capsule Take 10 mg by mouth daily      pregabalin (LYRICA) 100 MG capsule Take 200 mg by mouth 2 times daily. dicyclomine (BENTYL) 20 MG tablet Take 20 mg by mouth as needed (stomach pain)      ALPRAZolam (XANAX) 0.5 MG tablet Take 0.5 mg by mouth nightly. Take one tab as needed daily and one tab nightly. DULoxetine (CYMBALTA) 60 MG extended release capsule Take 120 mg by mouth daily      aspirin 81 MG tablet Take 81 mg by mouth daily      brexpiprazole (REXULTI) 1 MG TABS tablet Take 1 mg by mouth daily       No current facility-administered medications for this visit.        Review of Systems  Constitutional: No fever, no chills, no diaphoresis, no generalized weakness. HEENT: No blurred vision, No sore throat, no ear pain, no hair loss  Neck: denied any neck swelling, difficulty swallowing,   Cardio-pulmonary: No CP, SOB or palpitation, No orthopnea or PND. No cough or wheezing. GI: No N/V/D, no constipation, No abdominal pain, no melena or hematochezia   : Denied any dysuria, hematuria, flank pain, discharge, or incontinence. Skin: denied any rash, ulcer, Hirsute, or hyperpigmentation. MSK: denied any joint deformity, joint pain/swelling, muscle pain, or back pain. Neuro: no numbness, no tingling, no weakness, _    OBJECTIVE    /70   Pulse 78   Ht 5' (1.524 m)   Wt 220 lb (99.8 kg)   SpO2 99%   BMI 42.97 kg/m²   BP Readings from Last 4 Encounters:   11/23/22 104/70   09/20/22 110/70   03/04/20 130/80   02/22/20 (!) 127/58     Wt Readings from Last 6 Encounters:   11/23/22 220 lb (99.8 kg)   09/20/22 214 lb (97.1 kg)   03/04/20 222 lb (100.7 kg)   02/21/20 220 lb (99.8 kg)   12/26/19 221 lb (100.2 kg)   12/11/19 225 lb (102.1 kg)       Physical examination:  General: awake alert, oriented x3, no abnormal position or movements. HEENT: normocephalic non-traumatic, no exophthalmos   Neck: supple, no LN enlargement, no thyromegaly, no thyroid tenderness, no JVD. Pulm: Clear equal air entry no added sounds, no wheezing or rhonchi    CVS: S1 + S2, no murmur, no heave. Dorsalis pedis pulse palpable   Abd: soft lax, no tenderness, no organomegaly, audible bowel sounds. Skin: warm, no lesions, no rash.  No callus, no Ulcers, No acanthosis nigricans  Musculoskeletal: No back tenderness, no kyphosis/scoliosis    Neuro: CN intact, Monofilament sensation decreased bilateral , muscle power normal  Psych: normal mood, and affect      Review of Laboratory Data:  I personally reviewed the following lab:  Lab Results   Component Value Date/Time    WBC 8.8 11/09/2022 12:00 AM    RBC 4.93 11/09/2022 12:00 AM    HGB 13.9 11/09/2022 12:00 AM    HCT 41.1 11/09/2022 12:00 AM    MCV 83.5 11/09/2022 12:00 AM    MCH 28.2 11/09/2022 12:00 AM    MCHC 33.8 11/09/2022 12:00 AM    RDW 13.2 11/09/2022 12:00 AM     11/09/2022 12:00 AM    MPV 7.2 11/09/2022 12:00 AM      Lab Results   Component Value Date/Time     11/09/2022 12:00 AM    K 4.5 11/09/2022 12:00 AM    K 4.0 09/18/2019 12:47 PM    CO2 26 11/09/2022 12:00 AM    BUN 6 11/09/2022 12:00 AM    CREATININE 0.4 11/09/2022 12:00 AM    CALCIUM 9.7 11/09/2022 12:00 AM    LABGLOM >=60 11/09/2022 12:00 AM    LABGLOM >60 09/18/2019 12:47 PM    GFRAA >60 09/18/2019 12:47 PM      No results found for: TSH, T4FREE, F7BGKGQ, FT3, N4VGHOO, TSI, TPOABS, THGAB  Lab Results   Component Value Date/Time    LABA1C 10.3 09/20/2022 02:08 PM    GLUCOSE 347 11/09/2022 12:00 AM     Lab Results   Component Value Date/Time    LABA1C 10.3 09/20/2022 02:08 PM    LABA1C 10.6 03/04/2020 11:53 AM     No results found for: TRIG, HDL, LDLCALC, CHOL  No results found for: [address] [address], a 45 y.o.-old female seen in for the following issues       Assessment:      Diagnosis Orders   1. Uncontrolled type 2 diabetes mellitus with hyperglycemia (HCC)  DE CONTINUOUS GLUCOSE MONITORING ANALYSIS I&R      2. Class 3 severe obesity due to excess calories without serious comorbidity with body mass index (BMI) of 40.0 to 44.9 in adult ([facility]        3. Mixed hyperlipidemia        4. Type 2 diabetes mellitus with polyneuropathy ([facility]            Plan:     1.  Uncontrolled type 2 diabetes mellitus with hyperglycemia ([facility]   Patient's diabetes is uncontrolled  Haja CGM reviewed   Increase Novolin 70/30 to 40 units twice daily  Patient could not afford other brands of insulin  Start metformin  mg 1 tablet daily  Start Trulicity 2.25 mg once weekly for 2 weeks then increase to 1.5 mg thereafter  Advised to check blood sugars twice per day fasting and before dinner  Discussed with patient A1c and blood sugar goals   Optimal blood sugars: 100-140 pre-prandial, < 180 peak post-prandial  The patient counseled about the complications of uncontrolled diabetes   Patient was counselled about the importance of self-blood glucose monitoring and eating consistent carb diet to avoid blood sugar fluctuations   Patient will need routine diabetes maintenance and prevention  Discussed lifestyle changes including diet and exercise with patient; recommended 150 minutes of moderate intensity exercise per week. Counseled on optimal foot care   2. Class 3 severe obesity due to excess calories without serious comorbidity with body mass index (BMI) of 40.0 to 44.9 in adult [facility])   Discussed lifestyle changes including diet and exercise with patient in depth. Also discussed with patient cardiovascular risk associated with obesity  Possible bariatric surgery in the future   3. Mixed hyperlipidemia   Continue statin therapy  Encourage diet and exercise         I personally spent > 60 minutes reviewing  external notes from PCP and other patient's care team providers, and personally interpreted labs associated with the above diagnosis. I also ordered labs to further assess and manage the above addressed medical conditions. Return in about 3 months (around 2/23/2023). The above issues were reviewed with the patient who understood and agreed with the plan. Thank you for allowing us to participate in the care of this patient. Please do not hesitate to contact us with any additional questions. BREANNA Tian     [facility] Diabetes Care and Endocrinology   [address] [facility] [address]   Phone: [phone]  Fax: [phone]  --------------------------------------------  An electronic signature was used to authenticate this note.  BREANNA Tian on 11/23/2022 at 1:37 PM

## 2022-12-22 RX ORDER — METOPROLOL SUCCINATE 100 MG/1
TABLET, EXTENDED RELEASE ORAL
Qty: 90 TABLET | Refills: 2 | Status: SHIPPED | OUTPATIENT
Start: 2022-12-22

## 2023-02-17 ENCOUNTER — OFFICE VISIT (OUTPATIENT)
Dept: PRIMARY CARE CLINIC | Age: 53
End: 2023-02-17

## 2023-02-17 VITALS
HEIGHT: 60 IN | HEART RATE: 68 BPM | OXYGEN SATURATION: 96 % | DIASTOLIC BLOOD PRESSURE: 64 MMHG | SYSTOLIC BLOOD PRESSURE: 102 MMHG | WEIGHT: 215 LBS | BODY MASS INDEX: 42.21 KG/M2 | TEMPERATURE: 97.3 F | RESPIRATION RATE: 14 BRPM

## 2023-02-17 DIAGNOSIS — J01.90 ACUTE NON-RECURRENT SINUSITIS, UNSPECIFIED LOCATION: ICD-10-CM

## 2023-02-17 DIAGNOSIS — E66.01 OBESITY, CLASS III, BMI 40-49.9 (MORBID OBESITY) (HCC): ICD-10-CM

## 2023-02-17 DIAGNOSIS — F33.40 RECURRENT MAJOR DEPRESSIVE DISORDER, IN REMISSION (HCC): ICD-10-CM

## 2023-02-17 DIAGNOSIS — J32.9 CHRONIC SINUSITIS, UNSPECIFIED LOCATION: Primary | ICD-10-CM

## 2023-02-17 DIAGNOSIS — E11.42 TYPE 2 DIABETES MELLITUS WITH POLYNEUROPATHY (HCC): ICD-10-CM

## 2023-02-17 DIAGNOSIS — R63.5 WEIGHT GAIN: ICD-10-CM

## 2023-02-17 DIAGNOSIS — M79.89 LEG SWELLING: ICD-10-CM

## 2023-02-17 DIAGNOSIS — Z12.4 CERVICAL CANCER SCREENING: ICD-10-CM

## 2023-02-17 DIAGNOSIS — D68.51 FACTOR 5 LEIDEN MUTATION, HETEROZYGOUS (HCC): ICD-10-CM

## 2023-02-17 DIAGNOSIS — R63.5 WEIGHT GAIN: Primary | ICD-10-CM

## 2023-02-17 DIAGNOSIS — Z23 NEED FOR INFLUENZA VACCINATION: ICD-10-CM

## 2023-02-17 LAB
ALBUMIN SERPL-MCNC: 3.6 G/DL (ref 3.5–5.2)
ALP BLD-CCNC: 113 U/L (ref 35–104)
ALT SERPL-CCNC: 14 U/L (ref 0–32)
ANION GAP SERPL CALCULATED.3IONS-SCNC: 12 MMOL/L (ref 7–16)
AST SERPL-CCNC: 20 U/L (ref 0–31)
BILIRUB SERPL-MCNC: 1 MG/DL (ref 0–1.2)
BUN BLDV-MCNC: 11 MG/DL (ref 6–20)
C-REACTIVE PROTEIN: 8.8 MG/DL (ref 0–0.4)
CALCIUM SERPL-MCNC: 9.5 MG/DL (ref 8.6–10.2)
CHLORIDE BLD-SCNC: 96 MMOL/L (ref 98–107)
CHOLESTEROL, TOTAL: 160 MG/DL (ref 0–199)
CO2: 28 MMOL/L (ref 22–29)
CREAT SERPL-MCNC: 0.6 MG/DL (ref 0.5–1)
GFR SERPL CREATININE-BSD FRML MDRD: >60 ML/MIN/1.73
GLUCOSE BLD-MCNC: 119 MG/DL (ref 74–99)
HBA1C MFR BLD: 7.5 %
HCT VFR BLD CALC: 39 % (ref 34–48)
HDLC SERPL-MCNC: 49 MG/DL
HEMOGLOBIN: 13.3 G/DL (ref 11.5–15.5)
LDL CHOLESTEROL CALCULATED: 83 MG/DL (ref 0–99)
MCH RBC QN AUTO: 27.8 PG (ref 26–35)
MCHC RBC AUTO-ENTMCNC: 34.1 % (ref 32–34.5)
MCV RBC AUTO: 81.6 FL (ref 80–99.9)
PDW BLD-RTO: 14.3 FL (ref 11.5–15)
PLATELET # BLD: 283 E9/L (ref 130–450)
PMV BLD AUTO: 9.7 FL (ref 7–12)
POTASSIUM SERPL-SCNC: 4.7 MMOL/L (ref 3.5–5)
RBC # BLD: 4.78 E12/L (ref 3.5–5.5)
SEDIMENTATION RATE, ERYTHROCYTE: 69 MM/HR (ref 0–20)
SODIUM BLD-SCNC: 136 MMOL/L (ref 132–146)
TOTAL PROTEIN: 7.7 G/DL (ref 6.4–8.3)
TRIGL SERPL-MCNC: 140 MG/DL (ref 0–149)
TSH SERPL DL<=0.05 MIU/L-ACNC: 2.35 UIU/ML (ref 0.27–4.2)
VLDLC SERPL CALC-MCNC: 28 MG/DL
WBC # BLD: 11.7 E9/L (ref 4.5–11.5)

## 2023-02-17 RX ORDER — INSULIN ASPART 100 [IU]/ML
5 INJECTION, SOLUTION INTRAVENOUS; SUBCUTANEOUS
Qty: 5 ADJUSTABLE DOSE PRE-FILLED PEN SYRINGE | Refills: 3 | Status: SHIPPED | OUTPATIENT
Start: 2023-02-17

## 2023-02-17 RX ORDER — OXYCODONE HYDROCHLORIDE AND ACETAMINOPHEN 5; 325 MG/1; MG/1
TABLET ORAL
COMMUNITY
Start: 2023-01-06

## 2023-02-17 RX ORDER — DOXYCYCLINE HYCLATE 100 MG
100 TABLET ORAL 2 TIMES DAILY
Qty: 14 TABLET | Refills: 0 | Status: SHIPPED | OUTPATIENT
Start: 2023-02-17 | End: 2023-02-24

## 2023-02-17 SDOH — ECONOMIC STABILITY: HOUSING INSECURITY
IN THE LAST 12 MONTHS, WAS THERE A TIME WHEN YOU DID NOT HAVE A STEADY PLACE TO SLEEP OR SLEPT IN A SHELTER (INCLUDING NOW)?: NO

## 2023-02-17 SDOH — ECONOMIC STABILITY: FOOD INSECURITY: WITHIN THE PAST 12 MONTHS, THE FOOD YOU BOUGHT JUST DIDN'T LAST AND YOU DIDN'T HAVE MONEY TO GET MORE.: NEVER TRUE

## 2023-02-17 SDOH — ECONOMIC STABILITY: FOOD INSECURITY: WITHIN THE PAST 12 MONTHS, YOU WORRIED THAT YOUR FOOD WOULD RUN OUT BEFORE YOU GOT MONEY TO BUY MORE.: NEVER TRUE

## 2023-02-17 SDOH — ECONOMIC STABILITY: INCOME INSECURITY: HOW HARD IS IT FOR YOU TO PAY FOR THE VERY BASICS LIKE FOOD, HOUSING, MEDICAL CARE, AND HEATING?: NOT HARD AT ALL

## 2023-02-17 ASSESSMENT — PATIENT HEALTH QUESTIONNAIRE - PHQ9
4. FEELING TIRED OR HAVING LITTLE ENERGY: 0
3. TROUBLE FALLING OR STAYING ASLEEP: 0
SUM OF ALL RESPONSES TO PHQ9 QUESTIONS 1 & 2: 0
7. TROUBLE CONCENTRATING ON THINGS, SUCH AS READING THE NEWSPAPER OR WATCHING TELEVISION: 0
10. IF YOU CHECKED OFF ANY PROBLEMS, HOW DIFFICULT HAVE THESE PROBLEMS MADE IT FOR YOU TO DO YOUR WORK, TAKE CARE OF THINGS AT HOME, OR GET ALONG WITH OTHER PEOPLE: 0
5. POOR APPETITE OR OVEREATING: 0
2. FEELING DOWN, DEPRESSED OR HOPELESS: 0
1. LITTLE INTEREST OR PLEASURE IN DOING THINGS: 0
6. FEELING BAD ABOUT YOURSELF - OR THAT YOU ARE A FAILURE OR HAVE LET YOURSELF OR YOUR FAMILY DOWN: 0
SUM OF ALL RESPONSES TO PHQ QUESTIONS 1-9: 0
SUM OF ALL RESPONSES TO PHQ QUESTIONS 1-9: 0
9. THOUGHTS THAT YOU WOULD BE BETTER OFF DEAD, OR OF HURTING YOURSELF: 0
8. MOVING OR SPEAKING SO SLOWLY THAT OTHER PEOPLE COULD HAVE NOTICED. OR THE OPPOSITE, BEING SO FIGETY OR RESTLESS THAT YOU HAVE BEEN MOVING AROUND A LOT MORE THAN USUAL: 0
SUM OF ALL RESPONSES TO PHQ QUESTIONS 1-9: 0
SUM OF ALL RESPONSES TO PHQ QUESTIONS 1-9: 0

## 2023-02-17 NOTE — PROGRESS NOTES
2/17/23    Nya Ruff, a female of 45 y.o. presents today for Foot Swelling and Tinnitus (Left ear)      At last appointment she was started on medium dose sliding scale. She follows with counseling at [facility] Dr. Jael De La Rosa (Endo)    She has been having some swelling and weight gain. She has been having L ear pain. Diagnoses and all orders for this visit:  Weight gain  -     Comprehensive Metabolic Panel; Future  -     CBC; Future  -     TSH; Future  -     Sedimentation Rate; Future  Obesity, Class III, BMI 40-49.9 (morbid obesity) (HCC)  Recurrent major depressive disorder, in remission ([facility]  Factor 5 Leiden mutation, heterozygous ([facility]  Type 2 diabetes mellitus with polyneuropathy (HCC)  -     POCT glycosylated hemoglobin (Hb A1C)  -     Lipid Panel; Future  Cervical cancer screening  -     Anamaria Rice MD, OB/GYN, [place] ([place])  Leg swelling  -     C-Reactive Protein; Future  Acute non-recurrent sinusitis, unspecified location  -     doxycycline hyclate (VIBRA-TABS) 100 MG tablet; Take 1 tablet by mouth 2 times daily for 7 days  Need for influenza vaccination  -     Influenza, FLUCELVAX, (age 10 mo+), IM, Preservative Free, 0.5 mL  Other orders  -     insulin aspart (NOVOLOG FLEXPEN) 100 UNIT/ML injection pen; Inject 5 Units into the skin 3 times daily (before meals)       Plan    Obtain blood work to rule out any metabolic causes for her leg swelling  Repeat hemoglobin A1c  Start doxycycline for sinusitis  Start prandial insulin  Administer flu vaccine today  I will refer to GYN    Breast Cancer Screening due: will refer to gynecology 2.17.23    Colon Cancer screening due:  deferred today      /64   Pulse 68   Temp 97.3 °F (36.3 °C)   Resp 14   Ht 5' (1.524 m)   Wt 215 lb (97.5 kg)   SpO2 96%   BMI 41.99 kg/m²     Review of Systems  Constitutional:Negative for activity change, appetite change, chills, fatigue and fever.    Respiratory: Negative for choking, chest tightness, shortness of breath and wheezing. Cardiovascular: Negative for chest pain, palpitations and leg swelling. Gastrointestinal: Negative for abdominal distention, constipation, diarrhea, nausea and vomiting. Musculoskeletal: Negative for arthralgias, back pain, gait problem and joint swelling. Neurological: Negative for dizziness, weakness,numbness and headaches.      Patient Active Problem List    Diagnosis Date Noted    Factor 5 Leiden mutation, heterozygous ([facility] 05/05/2022    Lumbar spondylosis 09/16/2019    Lumbar disc disorder 09/16/2019    Lumbar radiculopathy 09/16/2019    Chronic pain syndrome 09/16/2019    Spinal stenosis of lumbar region without neurogenic claudication 09/16/2019    Primary osteoarthritis of left hip 09/16/2019    Essential hypertension 02/12/2019    Class 3 severe obesity due to excess calories with serious comorbidity and body mass index (BMI) of 45.0 to 49.9 in adult [facility]) 02/12/2019    Other insomnia 02/12/2019    Anxiety 02/12/2019    Recurrent major depressive disorder, in remission ([facility] 02/12/2019    Idiopathic peripheral neuropathy 02/12/2019    Herniated lumbar intervertebral disc 02/12/2019    Type 2 diabetes mellitus with polyneuropathy (HCC)      Allergies   Allergen Reactions    Cefdinir      Facial erythema     Clemastine      Other reaction(s): Loose voice    Neurontin [Gabapentin]     Theophyllines     Toradol [Ketorolac Tromethamine]      Face erythema     Vicodin Hp [Hydrocodone-Acetaminophen]     Wellbutrin [Bupropion]     Imodium [Loperamide] Nausea And Vomiting     Current Outpatient Medications on File Prior to Visit   Medication Sig Dispense Refill    oxyCODONE-acetaminophen (PERCOCET) 5-325 MG per tablet TAKE 1 TABLET BY MOUTH TWICE DAILY AS NEEDED FOR PAIN      metoprolol succinate (TOPROL XL) 100 MG extended release tablet TAKE 1 TABLET EVERY DAY 90 tablet 2    insulin 70-30 (NOVOLIN 70/30) (70-30) 100 UNIT per ML injection vial Inject 40 units before Bf and dinner 3 each 5    metFORMIN (GLUCOPHAGE-XR) 500 MG extended release tablet Take 1 tablet by mouth daily (with breakfast) 30 tablet 5    Insulin Syringe-Needle U-100 ([employer] INSULIN SYRINGE) 31G X 5/16\" 1 ML MISC 2 times per day 100 each 3    atorvastatin (LIPITOR) 10 MG tablet TAKE 1 TABLET EVERY DAY 90 tablet 1    lisinopril (PRINIVIL;ZESTRIL) 10 MG tablet TAKE 1 TABLET EVERY DAY 90 tablet 1    cetirizine (ZYRTEC) 10 MG tablet TAKE 1 TABLET EVERY DAY 90 tablet 1    albuterol sulfate HFA (VENTOLIN HFA) 108 (90 Base) MCG/ACT inhaler Inhale 2 puffs into the lungs every 6 hours as needed for Wheezing 3 each 3    albuterol sulfate HFA (PROVENTIL HFA) 108 (90 Base) MCG/ACT inhaler Inhale 2 puffs into the lungs every 4 hours as needed for Wheezing 3 each 1    buPROPion (WELLBUTRIN XL) 300 MG extended release tablet bupropion HCl  mg 24 hr tablet, extended release   TAKE ONE TABLET BY MOUTH EVERY MORNING      busPIRone (BUSPAR) 30 MG tablet TAKE ONE TABLET BY MOUTH TWO TIMES A DAY      Continuous Blood Gluc Sensor ([employer] SHELLEY SENSOR SYSTEM) MISC 1 Device by Does not apply route daily 1 each 0    diphenhydrAMINE HCl (BENADRYL ALLERGY PO) Take by mouth As needed      Handicap Placard MISC by Does not apply route 1 each 0    loratadine (CLARITIN) 10 MG capsule Take 10 mg by mouth daily      pregabalin (LYRICA) 100 MG capsule Take 200 mg by mouth 2 times daily. dicyclomine (BENTYL) 20 MG tablet Take 20 mg by mouth as needed (stomach pain)      ALPRAZolam (XANAX) 0.5 MG tablet Take 0.5 mg by mouth nightly. Take one tab as needed daily and one tab nightly.       DULoxetine (CYMBALTA) 60 MG extended release capsule Take 120 mg by mouth daily      aspirin 81 MG tablet Take 81 mg by mouth daily      dulaglutide (TRULICITY) 1.5 XI/4.9SW SC injection Inject 0.5 mLs into the skin once a week (Patient not taking: Reported on 2/17/2023) 12 Adjustable Dose Pre-filled Pen Syringe 4    pantoprazole (PROTONIX) 40 MG tablet Take 1 tablet by mouth in the morning and 1 tablet before bedtime. (Patient not taking: Reported on 2/17/2023) 180 tablet 1    famotidine (PEPCID) 20 MG tablet Take 1 tablet by mouth 2 times daily (Patient not taking: Reported on 2/17/2023) 180 tablet 1    brexpiprazole (REXULTI) 1 MG TABS tablet Take 1 mg by mouth daily (Patient not taking: Reported on 2/17/2023)       No current facility-administered medications on file prior to visit. Physical Exam   Constitutional:  Oriented to person, place, and time. Appears well-developed and well-nourished. No acute distress. HENT: No sinus tenderness or lymphadenopathy  Head: Normocephalic and atraumatic. Eyes: Eyes exhibits no discharge. No scleral icterus present. Neck: No tracheal deviation present. No thyromegaly present. Cardiovascular: Normal rate, regular rhythm, normal heart sounds and intact distal pulses. Exam reveals no gallop nor friction rub. No murmur heard. Pulmonary: Effort normal and breath sounds normal. No respiratory distress. No wheezes or rales. Abdomen: No signs of rigidity rebound or organomegaly  Musculoskeletal:  No tenderness to palpation  Neurological:Alert and oriented to person, place, and time. Skin: No diaphoresis. Psychiatric: Normal mood and affect. Behavior is Normal.     ASSESSMENT AND PLAN:        No follow-ups on file. Electronically signed by Nanci Draper DO on 2/17/2023 at 3 Ed Kyle DO    Assessment  Diagnoses and all orders for this visit:  Weight gain  -     Comprehensive Metabolic Panel; Future  -     CBC; Future  -     TSH; Future  -     Sedimentation Rate; Future  Obesity, Class III, BMI 40-49.9 (morbid obesity) (HCC)  Recurrent major depressive disorder, in remission ([facility]  Factor 5 Leiden mutation, heterozygous ([facility]  Type 2 diabetes mellitus with polyneuropathy (HCC)  -     POCT glycosylated hemoglobin (Hb A1C)  -     Lipid Panel;  Future  Cervical cancer screening  - Kenny Moeller MD, OB/GYN, [place] ([place])  Leg swelling  -     C-Reactive Protein; Future  Acute non-recurrent sinusitis, unspecified location  -     doxycycline hyclate (VIBRA-TABS) 100 MG tablet; Take 1 tablet by mouth 2 times daily for 7 days  Need for influenza vaccination  -     Influenza, FLUCELVAX, (age 10 mo+), IM, Preservative Free, 0.5 mL  Other orders  -     insulin aspart (NOVOLOG FLEXPEN) 100 UNIT/ML injection pen;  Inject 5 Units into the skin 3 times daily (before meals)

## 2023-02-20 LAB
AVERAGE GLUCOSE: 183
CHOLESTEROL, TOTAL: 165 MG/DL
CHOLESTEROL/HDL RATIO: NORMAL
HBA1C MFR BLD: 8 %
HDLC SERPL-MCNC: 51 MG/DL (ref 35–70)
INR BLD: 1.3
LDL CHOLESTEROL CALCULATED: 89 MG/DL (ref 0–160)
NONHDLC SERPL-MCNC: NORMAL MG/DL
PROTIME: 15.4 SECONDS
TRIGL SERPL-MCNC: 127 MG/DL
VLDLC SERPL CALC-MCNC: 25 MG/DL

## 2023-02-23 ENCOUNTER — OFFICE VISIT (OUTPATIENT)
Dept: ENDOCRINOLOGY | Age: 53
End: 2023-02-23
Payer: MEDICARE

## 2023-02-23 VITALS
DIASTOLIC BLOOD PRESSURE: 77 MMHG | SYSTOLIC BLOOD PRESSURE: 120 MMHG | WEIGHT: 218 LBS | HEART RATE: 78 BPM | HEIGHT: 60 IN | BODY MASS INDEX: 42.8 KG/M2 | OXYGEN SATURATION: 94 %

## 2023-02-23 DIAGNOSIS — E11.65 UNCONTROLLED TYPE 2 DIABETES MELLITUS WITH HYPERGLYCEMIA (HCC): Primary | ICD-10-CM

## 2023-02-23 DIAGNOSIS — E78.2 MIXED HYPERLIPIDEMIA: ICD-10-CM

## 2023-02-23 DIAGNOSIS — E66.01 CLASS 3 SEVERE OBESITY DUE TO EXCESS CALORIES WITHOUT SERIOUS COMORBIDITY WITH BODY MASS INDEX (BMI) OF 40.0 TO 44.9 IN ADULT (HCC): ICD-10-CM

## 2023-02-23 PROCEDURE — 99214 OFFICE O/P EST MOD 30 MIN: CPT | Performed by: CLINICAL NURSE SPECIALIST

## 2023-02-23 PROCEDURE — 3017F COLORECTAL CA SCREEN DOC REV: CPT | Performed by: CLINICAL NURSE SPECIALIST

## 2023-02-23 PROCEDURE — G8427 DOCREV CUR MEDS BY ELIG CLIN: HCPCS | Performed by: CLINICAL NURSE SPECIALIST

## 2023-02-23 PROCEDURE — 1036F TOBACCO NON-USER: CPT | Performed by: CLINICAL NURSE SPECIALIST

## 2023-02-23 PROCEDURE — 2022F DILAT RTA XM EVC RTNOPTHY: CPT | Performed by: CLINICAL NURSE SPECIALIST

## 2023-02-23 PROCEDURE — 3074F SYST BP LT 130 MM HG: CPT | Performed by: CLINICAL NURSE SPECIALIST

## 2023-02-23 PROCEDURE — 3078F DIAST BP <80 MM HG: CPT | Performed by: CLINICAL NURSE SPECIALIST

## 2023-02-23 PROCEDURE — 3051F HG A1C>EQUAL 7.0%<8.0%: CPT | Performed by: CLINICAL NURSE SPECIALIST

## 2023-02-23 PROCEDURE — G8417 CALC BMI ABV UP PARAM F/U: HCPCS | Performed by: CLINICAL NURSE SPECIALIST

## 2023-02-23 PROCEDURE — G8482 FLU IMMUNIZE ORDER/ADMIN: HCPCS | Performed by: CLINICAL NURSE SPECIALIST

## 2023-02-23 RX ORDER — SEMAGLUTIDE 1.34 MG/ML
INJECTION, SOLUTION SUBCUTANEOUS
Qty: 1 ADJUSTABLE DOSE PRE-FILLED PEN SYRINGE | Refills: 3 | Status: SHIPPED | OUTPATIENT
Start: 2023-02-23

## 2023-02-23 NOTE — PROGRESS NOTES
[address] [facility] Department of Endocrinology Diabetes and Metabolism   [address] [facility] [address]   Phone: [phone]  Fax: [phone]    Date of Service: 2/23/2023    Primary Care Physician: Rosette Castro DO  Referring physician: No ref. provider found  Provider: BREANNA Mishra - SAAD     Reason for the visit:  Type 2 diabetes      History of Present Illness: The history is provided by the patient. No  was used. Accuracy of the patient data is excellent.   Conrado Steven is a very pleasant 45 y.o. female seen today for diabetes management     Conrado Steven was diagnosed with diabetes dx several years ago  and currently on Novolin 70/30 40 units BID  Trulicity 1.5 weekly (stopped 3 weeks ago as offered by insurance any longer)  Metformin caused GI upset at higher doses   The patient has been checking blood sugar 2 times per day   Uses shiraz   Flitot to bring device   BG usually mid 100's per patient while taking trulicity   Most recent A1c results summarized below  Lab Results   Component Value Date/Time    LABA1C 7.5 02/17/2023 03:32 PM    LABA1C 10.3 09/20/2022 02:08 PM    LABA1C 10.6 03/04/2020 11:53 AM     Patient has had no hypoglycemic episodes   The patient hasn't been mindful of what has been eating and wasn't following diabetes diet    I reviewed current medications and the patient has no issues with diabetes medications  Conrado Steven is up to date with eye exam and denied any history of diabetic retinopathy   also performs  own feet care  Microvascular complications:  No Retinopathy, Nephropathy or + Neuropathy   Macrovascular complications: no CAD, PVD, or Stroke  No HX of pancreatitis  No Hx of MTC  No HX of gastroparesis   No HX of UTI/Mycotic infection         PAST MEDICAL HISTORY   Past Medical History:   Diagnosis Date    Asthma     Cirrhosis of liver ([facility]     DM (diabetes mellitus) ([facility]     GERD (gastroesophageal reflux disease)     Hypertension     IBS (irritable bowel syndrome)     OCD (obsessive compulsive disorder)     Sleep apnea     Stomach cancer ([facility]        PAST SURGICAL HISTORY   Past Surgical History:   Procedure Laterality Date    COLONOSCOPY      EPIDURAL STEROID INJECTION Bilateral 10/3/2019    BILATERAL L5 - S1 TRANSFORAMINAL EPIDURAL STEROID INJECTION performed by Jordin Almeida MD at [address]  Bilateral 10/03/2019    BILATERAL L5-S1 TRANSFORAMINAL EPIDURAL STEROID INJECTION    UPPER GASTROINTESTINAL ENDOSCOPY      stomach cancer removed       SOCIAL HISTORY   Tobacco:   reports that she has never smoked. She has never used smokeless tobacco.  Alcohol:   reports no history of alcohol use. Drugs:   reports no history of drug use.     FAMILY HISTORY   Family History   Problem Relation Age of Onset    Cancer Other     Depression Other     Diabetes Other     Heart Disease Other     Mental Illness Other     Stroke Other        ALLERGIES AND DRUG REACTIONS   Allergies   Allergen Reactions    Cefdinir      Facial erythema     Clemastine      Other reaction(s): Loose voice    Neurontin [Gabapentin]     Theophyllines     Toradol [Ketorolac Tromethamine]      Face erythema     Vicodin Hp [Hydrocodone-Acetaminophen]     Wellbutrin [Bupropion]     Imodium [Loperamide] Nausea And Vomiting       CURRENT MEDICATIONS   Current Outpatient Medications   Medication Sig Dispense Refill    Semaglutide,0.25 or 0.5MG/DOS, (OZEMPIC, 0.25 OR 0.5 MG/DOSE,) 2 MG/1.5ML SOPN 0.5 mg once weekly subcutaneous 1 Adjustable Dose Pre-filled Pen Syringe 3    insulin 70-30 (NOVOLIN 70/30) (70-30) 100 UNIT per ML injection vial Inject 40 units before Bf and dinner 3 each 5    metFORMIN (GLUCOPHAGE-XR) 500 MG extended release tablet Take 1 tablet by mouth daily (with breakfast) 30 tablet 5    oxyCODONE-acetaminophen (PERCOCET) 5-325 MG per tablet TAKE 1 TABLET BY MOUTH TWICE DAILY AS NEEDED FOR PAIN      doxycycline hyclate (VIBRA-TABS) 100 MG tablet Take 1 tablet by mouth 2 times daily for 7 days 14 tablet 0    insulin aspart (NOVOLOG FLEXPEN) 100 UNIT/ML injection pen Inject 5 Units into the skin 3 times daily (before meals) (Patient not taking: Reported on 2/23/2023) 5 Adjustable Dose Pre-filled Pen Syringe 3    metoprolol succinate (TOPROL XL) 100 MG extended release tablet TAKE 1 TABLET EVERY DAY 90 tablet 2    dulaglutide (TRULICITY) 1.5 KI/9.9MP SC injection Inject 0.5 mLs into the skin once a week (Patient not taking: Reported on 2/17/2023) 12 Adjustable Dose Pre-filled Pen Syringe 4    Insulin Syringe-Needle U-100 ([employer] INSULIN SYRINGE) 31G X 5/16\" 1 ML MISC 2 times per day 100 each 3    atorvastatin (LIPITOR) 10 MG tablet TAKE 1 TABLET EVERY DAY 90 tablet 1    lisinopril (PRINIVIL;ZESTRIL) 10 MG tablet TAKE 1 TABLET EVERY DAY 90 tablet 1    pantoprazole (PROTONIX) 40 MG tablet Take 1 tablet by mouth in the morning and 1 tablet before bedtime.  (Patient not taking: Reported on 2/17/2023) 180 tablet 1    cetirizine (ZYRTEC) 10 MG tablet TAKE 1 TABLET EVERY DAY 90 tablet 1    albuterol sulfate HFA (VENTOLIN HFA) 108 (90 Base) MCG/ACT inhaler Inhale 2 puffs into the lungs every 6 hours as needed for Wheezing 3 each 3    albuterol sulfate HFA (PROVENTIL HFA) 108 (90 Base) MCG/ACT inhaler Inhale 2 puffs into the lungs every 4 hours as needed for Wheezing 3 each 1    buPROPion (WELLBUTRIN XL) 300 MG extended release tablet bupropion HCl  mg 24 hr tablet, extended release   TAKE ONE TABLET BY MOUTH EVERY MORNING      busPIRone (BUSPAR) 30 MG tablet TAKE ONE TABLET BY MOUTH TWO TIMES A DAY      Continuous Blood Gluc Sensor ([employer] SHELLEY SENSOR SYSTEM) MISC 1 Device by Does not apply route daily 1 each 0    famotidine (PEPCID) 20 MG tablet Take 1 tablet by mouth 2 times daily (Patient not taking: Reported on 2/17/2023) 180 tablet 1    diphenhydrAMINE HCl (BENADRYL ALLERGY PO) Take by mouth As needed      Handicap Placard MISC by Does not apply route 1 each 0    loratadine (CLARITIN) 10 MG capsule Take 10 mg by mouth daily      pregabalin (LYRICA) 100 MG capsule Take 200 mg by mouth 2 times daily. dicyclomine (BENTYL) 20 MG tablet Take 20 mg by mouth as needed (stomach pain)      ALPRAZolam (XANAX) 0.5 MG tablet Take 0.5 mg by mouth nightly. Take one tab as needed daily and one tab nightly. DULoxetine (CYMBALTA) 60 MG extended release capsule Take 120 mg by mouth daily      aspirin 81 MG tablet Take 81 mg by mouth daily      brexpiprazole (REXULTI) 1 MG TABS tablet Take 1 mg by mouth daily (Patient not taking: Reported on 2/17/2023)       No current facility-administered medications for this visit. Review of Systems  Constitutional: No fever, no chills, no diaphoresis, no generalized weakness. HEENT: No blurred vision, No sore throat, no ear pain, no hair loss  Neck: denied any neck swelling, difficulty swallowing,   Cardio-pulmonary: No CP, SOB or palpitation, No orthopnea or PND. No cough or wheezing. GI: No N/V/D, no constipation, No abdominal pain, no melena or hematochezia   : Denied any dysuria, hematuria, flank pain, discharge, or incontinence. Skin: denied any rash, ulcer, Hirsute, or hyperpigmentation. MSK: denied any joint deformity, joint pain/swelling, muscle pain, or back pain. Neuro: no numbness, no tingling, no weakness, _    OBJECTIVE    /77   Pulse 78   Ht 5' (1.524 m)   Wt 218 lb (98.9 kg)   SpO2 94%   BMI 42.58 kg/m²   BP Readings from Last 4 Encounters:   02/23/23 120/77   02/17/23 102/64   11/23/22 104/70   09/20/22 110/70     Wt Readings from Last 6 Encounters:   02/23/23 218 lb (98.9 kg)   02/17/23 215 lb (97.5 kg)   11/23/22 220 lb (99.8 kg)   09/20/22 214 lb (97.1 kg)   03/04/20 222 lb (100.7 kg)   02/21/20 220 lb (99.8 kg)       Physical examination:  General: awake alert, oriented x3, no abnormal position or movements.   HEENT: normocephalic non-traumatic, no exophthalmos Neck: supple, no LN enlargement, no thyromegaly, no thyroid tenderness, no JVD. Pulm: Clear equal air entry no added sounds, no wheezing or rhonchi    CVS: S1 + S2, no murmur, no heave. Dorsalis pedis pulse palpable   Abd: soft lax, no tenderness, no organomegaly, audible bowel sounds. Skin: warm, no lesions, no rash.  No callus, no Ulcers, No acanthosis nigricans  Musculoskeletal: No back tenderness, no kyphosis/scoliosis    Neuro: CN intact, Monofilament sensation decreased bilateral , muscle power normal  Psych: normal mood, and affect      Review of Laboratory Data:  I personally reviewed the following lab:  Lab Results   Component Value Date/Time    WBC 11.7 (H) 02/17/2023 06:30 PM    RBC 4.78 02/17/2023 06:30 PM    HGB 13.3 02/17/2023 06:30 PM    HCT 39.0 02/17/2023 06:30 PM    MCV 81.6 02/17/2023 06:30 PM    MCH 27.8 02/17/2023 06:30 PM    MCHC 34.1 02/17/2023 06:30 PM    RDW 14.3 02/17/2023 06:30 PM     02/17/2023 06:30 PM    MPV 9.7 02/17/2023 06:30 PM      Lab Results   Component Value Date/Time     02/17/2023 06:30 PM    K 4.7 02/17/2023 06:30 PM    K 4.0 09/18/2019 12:47 PM    CO2 28 02/17/2023 06:30 PM    BUN 11 02/17/2023 06:30 PM    CREATININE 0.6 02/17/2023 06:30 PM    CALCIUM 9.5 02/17/2023 06:30 PM    LABGLOM >60 02/17/2023 06:30 PM    GFRAA >60 09/18/2019 12:47 PM      Lab Results   Component Value Date/Time    TSH 2.350 02/17/2023 06:30 PM     Lab Results   Component Value Date/Time    LABA1C 7.5 02/17/2023 03:32 PM    GLUCOSE 119 02/17/2023 06:30 PM     Lab Results   Component Value Date/Time    LABA1C 7.5 02/17/2023 03:32 PM    LABA1C 10.3 09/20/2022 02:08 PM    LABA1C 10.6 03/04/2020 11:53 AM     Lab Results   Component Value Date/Time    TRIG 140 02/17/2023 06:30 PM    HDL 49 02/17/2023 06:30 PM    LDLCALC 83 02/17/2023 06:30 PM    CHOL 160 02/17/2023 06:30 PM     No results found for: [address] [address], a 45 y.o.-old female seen in for the following issues       Assessment:      Diagnosis Orders   1. Uncontrolled type 2 diabetes mellitus with hyperglycemia (HCC)        2. Class 3 severe obesity due to excess calories without serious comorbidity with body mass index (BMI) of 40.0 to 44.9 in adult ([facility]        3. Mixed hyperlipidemia              Plan:     1. Uncontrolled type 2 diabetes mellitus with hyperglycemia ([facility]   Patient's diabetes improved from previous. Hemoglobin A1c 7.5%  Continue Novolin 70/30 to 40 units twice daily  Continue metformin extended release 500 mg daily  Patient states insurance would no longer cover Trulicity  Start Ozempic 0.5 mg once weekly to see if this is better covered  Patient assistance phone number given to patient as well  Advised to check blood sugars twice per day fasting and before dinner  Discussed with patient A1c and blood sugar goals   Optimal blood sugars: 100-140 pre-prandial, < 180 peak post-prandial  The patient counseled about the complications of uncontrolled diabetes   Patient was counselled about the importance of self-blood glucose monitoring and eating consistent carb diet to avoid blood sugar fluctuations   Patient will need routine diabetes maintenance and prevention  Encourage diet and exercise  Counseled on optimal foot care   2. Class 3 severe obesity due to excess calories without serious comorbidity with body mass index (BMI) of 40.0 to 44.9 in adult [facility])   Discussed lifestyle changes including diet and exercise with patient in depth. Also discussed with patient cardiovascular risk associated with obesity  Possible bariatric surgery in the future   3. Mixed hyperlipidemia   Continue statin therapy  Encourage diet and exercise         I personally spent > 30 minutes reviewing  external notes from PCP and other patient's care team providers, and personally interpreted labs associated with the above diagnosis.  I also ordered labs to further assess and manage the above addressed medical conditions. Return in about 3 months (around 5/23/2023). The above issues were reviewed with the patient who understood and agreed with the plan. Thank you for allowing us to participate in the care of this patient. Please do not hesitate to contact us with any additional questions. BREANNA Bob     [facility] Diabetes Care and Endocrinology   [address] [place] [address]   Phone: [phone]  Fax: [phone]  --------------------------------------------  An electronic signature was used to authenticate this note.  BREANNA Bob on 2/23/2023 at 2:28 PM

## 2023-03-15 ENCOUNTER — TELEPHONE (OUTPATIENT)
Dept: ADMINISTRATIVE | Age: 53
End: 2023-03-15

## 2023-03-15 NOTE — TELEPHONE ENCOUNTER
Patient is having gastric bypass 4/10 and is requesting a follow up apt with Carmen Chol the week of 4/17 to adjust her sugars. Please call patient and advise. Thank you!

## 2023-03-16 ENCOUNTER — TELEPHONE (OUTPATIENT)
Dept: PRIMARY CARE CLINIC | Age: 53
End: 2023-03-16

## 2023-03-16 NOTE — TELEPHONE ENCOUNTER
----- Message from Steven Jennifer sent at 3/15/2023  3:45 PM EDT -----  Subject: Appointment Request    Reason for Call: Established Patient Appointment needed: Routine Existing   Condition Follow Up (Diabetes)    QUESTIONS    Reason for appointment request? Available appointments did not meet   patient need     Additional Information for Provider? post op follow up-pt is having   gastric bypass on April 10th with Dr. Cassidy Peguero at [facility]   and was instructed to follow up with pcp 10 days after and diabetes follow   med adjustment as well-screened green   ---------------------------------------------------------------------------  --------------  Osmin Balderas SGMQ  [phone]; OK to leave message on voicemail  ---------------------------------------------------------------------------  --------------  SCRIPT ANSWERS  COVID Screen: Steph Quintero

## 2023-04-03 ENCOUNTER — TELEPHONE (OUTPATIENT)
Dept: ADMINISTRATIVE | Age: 53
End: 2023-04-03

## 2023-04-03 NOTE — TELEPHONE ENCOUNTER
Attempted to call patient back x2 and phone just rings busy. If patient calls back notify patient that she does not need an appointment in the office, Reji Silvermaster just needs to see her blood sugars.

## 2023-04-03 NOTE — TELEPHONE ENCOUNTER
Patient is having a procedure on 4/10 and called twice now to schedule a 1 week follow up for her sugar with Anuradha Newton. Please call patient. THank you!

## 2023-04-17 ENCOUNTER — TELEPHONE (OUTPATIENT)
Dept: ENDOCRINOLOGY | Age: 53
End: 2023-04-17

## 2023-04-17 NOTE — TELEPHONE ENCOUNTER
The pt had gastric bypass on 4/10/23. She c/o low blood sugars since her surgery. She has been holding her insulin and will drop off her blood sugars tomorrow.

## 2023-04-18 ENCOUNTER — OFFICE VISIT (OUTPATIENT)
Dept: PRIMARY CARE CLINIC | Age: 53
End: 2023-04-18
Payer: MEDICARE

## 2023-04-18 ENCOUNTER — TELEPHONE (OUTPATIENT)
Dept: ENDOCRINOLOGY | Age: 53
End: 2023-04-18

## 2023-04-18 VITALS
TEMPERATURE: 97.2 F | BODY MASS INDEX: 42.11 KG/M2 | RESPIRATION RATE: 16 BRPM | OXYGEN SATURATION: 96 % | HEART RATE: 78 BPM | WEIGHT: 214.5 LBS | DIASTOLIC BLOOD PRESSURE: 62 MMHG | SYSTOLIC BLOOD PRESSURE: 100 MMHG | HEIGHT: 60 IN

## 2023-04-18 DIAGNOSIS — F33.40 RECURRENT MAJOR DEPRESSIVE DISORDER, IN REMISSION (HCC): ICD-10-CM

## 2023-04-18 DIAGNOSIS — E66.01 OBESITY, CLASS III, BMI 40-49.9 (MORBID OBESITY) (HCC): ICD-10-CM

## 2023-04-18 DIAGNOSIS — I10 ESSENTIAL HYPERTENSION: Primary | ICD-10-CM

## 2023-04-18 PROCEDURE — 3078F DIAST BP <80 MM HG: CPT | Performed by: INTERNAL MEDICINE

## 2023-04-18 PROCEDURE — G8417 CALC BMI ABV UP PARAM F/U: HCPCS | Performed by: INTERNAL MEDICINE

## 2023-04-18 PROCEDURE — G8427 DOCREV CUR MEDS BY ELIG CLIN: HCPCS | Performed by: INTERNAL MEDICINE

## 2023-04-18 PROCEDURE — 1036F TOBACCO NON-USER: CPT | Performed by: INTERNAL MEDICINE

## 2023-04-18 PROCEDURE — 99213 OFFICE O/P EST LOW 20 MIN: CPT | Performed by: INTERNAL MEDICINE

## 2023-04-18 PROCEDURE — 3017F COLORECTAL CA SCREEN DOC REV: CPT | Performed by: INTERNAL MEDICINE

## 2023-04-18 PROCEDURE — 3074F SYST BP LT 130 MM HG: CPT | Performed by: INTERNAL MEDICINE

## 2023-04-18 RX ORDER — DICYCLOMINE HCL 20 MG
20 TABLET ORAL PRN
Qty: 120 TABLET | Refills: 2 | Status: SHIPPED
Start: 2023-04-18 | End: 2023-04-20 | Stop reason: SDUPTHER

## 2023-04-18 NOTE — TELEPHONE ENCOUNTER
She has not been holding her DM meds since she had gastric bypass surgery on 4/10. She says she has been having lows.      Her normal regimen is  -  Ozempic 0.5 mg once weekly   - metformin extended release 500 mg daily  - Novolin 70/30 to 40 units twice daily

## 2023-04-18 NOTE — PROGRESS NOTES
HENT: No sinus tenderness or lymphadenopathy  Head: Normocephalic and atraumatic. Eyes: Eyes exhibits no discharge. No scleral icterus present. Neck: No tracheal deviation present. No thyromegaly present. Cardiovascular: Normal rate, regular rhythm, normal heart sounds and intact distal pulses. Exam reveals no gallop nor friction rub. No murmur heard. Pulmonary: Effort normal and breath sounds normal. No respiratory distress. No wheezes or rales. Abdomen: No signs of rigidity rebound or organomegaly  Musculoskeletal:  No tenderness to palpation  Neurological:Alert and oriented to person, place, and time. Skin: No diaphoresis. Psychiatric: Normal mood and affect. Behavior is Normal.     ASSESSMENT AND PLAN:        No follow-ups on file. Electronically signed by Lisa Gonzalez DO on 4/18/2023 at 2:41 PM    Lisa Gonzalez DO    Assessment  There are no diagnoses linked to this encounter.

## 2023-04-19 NOTE — TELEPHONE ENCOUNTER
Please obtain blood glucose log for the last 1 week  If patient is having significant lows please have her hold all her diabetic medications and send blood glucose log in 1 week.   Please have her call sooner if blood sugars are greater than 300 3 consecutive times

## 2023-04-20 RX ORDER — DICYCLOMINE HCL 20 MG
20 TABLET ORAL EVERY 6 HOURS
Qty: 120 TABLET | Refills: 2 | Status: SHIPPED | OUTPATIENT
Start: 2023-04-20

## 2023-04-26 ENCOUNTER — TELEPHONE (OUTPATIENT)
Dept: PRIMARY CARE CLINIC | Age: 53
End: 2023-04-26

## 2023-04-26 DIAGNOSIS — I95.1 ORTHOSTASIS: Primary | ICD-10-CM

## 2023-04-26 RX ORDER — METOPROLOL SUCCINATE 50 MG/1
50 TABLET, EXTENDED RELEASE ORAL DAILY
Qty: 90 TABLET | Refills: 1 | Status: SHIPPED | OUTPATIENT
Start: 2023-04-26

## 2023-04-26 NOTE — TELEPHONE ENCOUNTER
Pt called and states she is still having low blood pressure, ranges around 92/60. Pt states she is very tired, lightheadedness, and slight dizziness. Pt not sure what she should do for her low pressures, if any medication needs adjusted. Please advise, pt would like a call back from office.

## 2023-05-31 ENCOUNTER — OFFICE VISIT (OUTPATIENT)
Dept: ENDOCRINOLOGY | Age: 53
End: 2023-05-31

## 2023-05-31 ENCOUNTER — FOLLOWUP TELEPHONE ENCOUNTER (OUTPATIENT)
Dept: ENDOCRINOLOGY | Age: 53
End: 2023-05-31

## 2023-05-31 VITALS
SYSTOLIC BLOOD PRESSURE: 125 MMHG | HEIGHT: 60 IN | DIASTOLIC BLOOD PRESSURE: 84 MMHG | OXYGEN SATURATION: 99 % | BODY MASS INDEX: 36.71 KG/M2 | WEIGHT: 187 LBS | HEART RATE: 80 BPM

## 2023-05-31 DIAGNOSIS — E16.2 HYPOGLYCEMIA: ICD-10-CM

## 2023-05-31 DIAGNOSIS — E66.09 CLASS 2 OBESITY DUE TO EXCESS CALORIES WITHOUT SERIOUS COMORBIDITY WITH BODY MASS INDEX (BMI) OF 36.0 TO 36.9 IN ADULT: ICD-10-CM

## 2023-05-31 DIAGNOSIS — E11.42 TYPE 2 DIABETES MELLITUS WITH POLYNEUROPATHY (HCC): Primary | ICD-10-CM

## 2023-05-31 DIAGNOSIS — E78.2 MIXED HYPERLIPIDEMIA: ICD-10-CM

## 2023-05-31 LAB — HBA1C MFR BLD: 5.1 %

## 2023-05-31 RX ORDER — BLOOD SUGAR DIAGNOSTIC
STRIP MISCELLANEOUS
Qty: 100 EACH | Refills: 11 | Status: SHIPPED | OUTPATIENT
Start: 2023-05-31

## 2023-05-31 RX ORDER — BLOOD-GLUCOSE METER
EACH MISCELLANEOUS
Qty: 1 KIT | Refills: 0 | Status: SHIPPED | OUTPATIENT
Start: 2023-05-31

## 2023-05-31 RX ORDER — LANCETS 33 GAUGE
EACH MISCELLANEOUS
Qty: 100 EACH | Refills: 11 | Status: SHIPPED | OUTPATIENT
Start: 2023-05-31

## 2023-05-31 NOTE — TELEPHONE ENCOUNTER
Met with patient in endo office for hypoglycemia. She had bariatric surgery 4/10/23, currently following bariatric diet regimen (2 oz protein, 1 oz fiber every 4 hours, 64 oz water between meals). She notes hypoglycemia overnight, usually around 6am. Per recall, she eats her last meal between 5-7pm and goes to bed at 9pm. Explained that a small snack before bed may help to maintain stable blood sugar overnight. Advised patient to reach out to bariatric dietitian for guidance on diet adjustments. Patient agreeable.      José Brambila RDN LD

## 2023-05-31 NOTE — PROGRESS NOTES
GLUCOSE MONITORING ANALYSIS I&R      2. Mixed hyperlipidemia        3. Hypoglycemia        4. Class 2 obesity due to excess calories without serious comorbidity with body mass index (BMI) of 36.0 to 36.9 in adult                Plan:     1. Uncontrolled type 2 diabetes mellitus with polyneuropathy   Patient diabetes well controlled. Hemoglobin A1c 5.1%  And is off all diabetic medications status post Spike-en-Y 4/2023   I have reviewed shiraz  Occasional hypoglycemia  Patient will meet with dietitian at today's visit to discuss reactive hypoglycemia and foods that will help prevent this from occurring  I advised patient to check blood sugars daily and as needed  advised patient if Martina Sandersoners is stating she is having a hypoglycemic event to check with finger stick blood glucose to confirm  Counseled on hypoglycemia. Counseled on treatment of hypoglycemia  Patient verbalized understanding   2. Class 2 severe obesity due to excess calories without serious comorbidity   Lost 31 pounds since Spike-en-Y  Graduated on weight loss  Discussed lifestyle changes including diet and exercise with patient in depth. Also discussed with patient cardiovascular risk associated with obesity     3. Mixed hyperlipidemia   Continue statin therapy  Encourage diet and exercise     4. Hypoglycemia  See above    I personally spent > 30 minutes reviewing  external notes from PCP and other patient's care team providers, and personally interpreted labs associated with the above diagnosis. I also ordered labs to further assess and manage the above addressed medical conditions. Return in about 3 months (around 8/31/2023). The above issues were reviewed with the patient who understood and agreed with the plan. Thank you for allowing us to participate in the care of this patient. Please do not hesitate to contact us with any additional questions.      Fahad Chu, [address] and Endocrinology   [facility]

## 2023-06-26 ENCOUNTER — TELEMEDICINE (OUTPATIENT)
Dept: PRIMARY CARE CLINIC | Age: 53
End: 2023-06-26
Payer: MEDICARE

## 2023-06-26 DIAGNOSIS — G93.9 WHITE MATTER LESION OF CENTRAL NERVOUS SYSTEM: Primary | ICD-10-CM

## 2023-06-26 DIAGNOSIS — I10 ESSENTIAL HYPERTENSION: ICD-10-CM

## 2023-06-26 DIAGNOSIS — J30.2 SEASONAL ALLERGIES: ICD-10-CM

## 2023-06-26 PROCEDURE — 3017F COLORECTAL CA SCREEN DOC REV: CPT | Performed by: INTERNAL MEDICINE

## 2023-06-26 PROCEDURE — 1036F TOBACCO NON-USER: CPT | Performed by: INTERNAL MEDICINE

## 2023-06-26 PROCEDURE — G8428 CUR MEDS NOT DOCUMENT: HCPCS | Performed by: INTERNAL MEDICINE

## 2023-06-26 PROCEDURE — 99213 OFFICE O/P EST LOW 20 MIN: CPT | Performed by: INTERNAL MEDICINE

## 2023-06-26 PROCEDURE — G8417 CALC BMI ABV UP PARAM F/U: HCPCS | Performed by: INTERNAL MEDICINE

## 2023-06-27 RX ORDER — CETIRIZINE HYDROCHLORIDE 10 MG/1
10 TABLET ORAL DAILY
Qty: 90 TABLET | Refills: 1 | Status: SHIPPED | OUTPATIENT
Start: 2023-06-27

## 2023-09-20 RX ORDER — METOPROLOL SUCCINATE 50 MG/1
50 TABLET, EXTENDED RELEASE ORAL DAILY
Qty: 90 TABLET | Refills: 1 | Status: SHIPPED | OUTPATIENT
Start: 2023-09-20

## 2023-10-19 ENCOUNTER — OFFICE VISIT (OUTPATIENT)
Dept: ENDOCRINOLOGY | Age: 53
End: 2023-10-19
Payer: MEDICARE

## 2023-10-19 VITALS
SYSTOLIC BLOOD PRESSURE: 106 MMHG | WEIGHT: 171 LBS | HEART RATE: 61 BPM | BODY MASS INDEX: 33.57 KG/M2 | DIASTOLIC BLOOD PRESSURE: 63 MMHG | HEIGHT: 60 IN

## 2023-10-19 DIAGNOSIS — E16.2 HYPOGLYCEMIA: ICD-10-CM

## 2023-10-19 DIAGNOSIS — E66.09 CLASS 1 OBESITY DUE TO EXCESS CALORIES WITHOUT SERIOUS COMORBIDITY WITH BODY MASS INDEX (BMI) OF 33.0 TO 33.9 IN ADULT: ICD-10-CM

## 2023-10-19 DIAGNOSIS — E78.2 MIXED HYPERLIPIDEMIA: ICD-10-CM

## 2023-10-19 DIAGNOSIS — E11.42 TYPE 2 DIABETES MELLITUS WITH POLYNEUROPATHY (HCC): Primary | ICD-10-CM

## 2023-10-19 PROCEDURE — G8484 FLU IMMUNIZE NO ADMIN: HCPCS | Performed by: CLINICAL NURSE SPECIALIST

## 2023-10-19 PROCEDURE — 3017F COLORECTAL CA SCREEN DOC REV: CPT | Performed by: CLINICAL NURSE SPECIALIST

## 2023-10-19 PROCEDURE — G8417 CALC BMI ABV UP PARAM F/U: HCPCS | Performed by: CLINICAL NURSE SPECIALIST

## 2023-10-19 PROCEDURE — 3078F DIAST BP <80 MM HG: CPT | Performed by: CLINICAL NURSE SPECIALIST

## 2023-10-19 PROCEDURE — G8427 DOCREV CUR MEDS BY ELIG CLIN: HCPCS | Performed by: CLINICAL NURSE SPECIALIST

## 2023-10-19 PROCEDURE — 1036F TOBACCO NON-USER: CPT | Performed by: CLINICAL NURSE SPECIALIST

## 2023-10-19 PROCEDURE — 3074F SYST BP LT 130 MM HG: CPT | Performed by: CLINICAL NURSE SPECIALIST

## 2023-10-19 PROCEDURE — 3044F HG A1C LEVEL LT 7.0%: CPT | Performed by: CLINICAL NURSE SPECIALIST

## 2023-10-19 PROCEDURE — 2022F DILAT RTA XM EVC RTNOPTHY: CPT | Performed by: CLINICAL NURSE SPECIALIST

## 2023-10-19 PROCEDURE — 99214 OFFICE O/P EST MOD 30 MIN: CPT | Performed by: CLINICAL NURSE SPECIALIST

## 2023-10-19 RX ORDER — BUPROPION HYDROCHLORIDE 150 MG/1
TABLET, EXTENDED RELEASE ORAL
COMMUNITY
Start: 2023-09-15

## 2023-10-24 ENCOUNTER — OFFICE VISIT (OUTPATIENT)
Dept: PRIMARY CARE CLINIC | Age: 53
End: 2023-10-24
Payer: MEDICARE

## 2023-10-24 VITALS
TEMPERATURE: 97.5 F | HEART RATE: 65 BPM | SYSTOLIC BLOOD PRESSURE: 118 MMHG | WEIGHT: 170 LBS | BODY MASS INDEX: 33.2 KG/M2 | OXYGEN SATURATION: 95 % | DIASTOLIC BLOOD PRESSURE: 78 MMHG

## 2023-10-24 DIAGNOSIS — J30.2 SEASONAL ALLERGIES: ICD-10-CM

## 2023-10-24 DIAGNOSIS — L65.9 HAIR LOSS: Primary | ICD-10-CM

## 2023-10-24 DIAGNOSIS — Z13.220 SCREENING CHOLESTEROL LEVEL: ICD-10-CM

## 2023-10-24 PROCEDURE — G0009 ADMIN PNEUMOCOCCAL VACCINE: HCPCS | Performed by: INTERNAL MEDICINE

## 2023-10-24 PROCEDURE — G8417 CALC BMI ABV UP PARAM F/U: HCPCS | Performed by: INTERNAL MEDICINE

## 2023-10-24 PROCEDURE — G8484 FLU IMMUNIZE NO ADMIN: HCPCS | Performed by: INTERNAL MEDICINE

## 2023-10-24 PROCEDURE — 99214 OFFICE O/P EST MOD 30 MIN: CPT | Performed by: INTERNAL MEDICINE

## 2023-10-24 PROCEDURE — 1036F TOBACCO NON-USER: CPT | Performed by: INTERNAL MEDICINE

## 2023-10-24 PROCEDURE — 3074F SYST BP LT 130 MM HG: CPT | Performed by: INTERNAL MEDICINE

## 2023-10-24 PROCEDURE — 3078F DIAST BP <80 MM HG: CPT | Performed by: INTERNAL MEDICINE

## 2023-10-24 PROCEDURE — 90677 PCV20 VACCINE IM: CPT | Performed by: INTERNAL MEDICINE

## 2023-10-24 PROCEDURE — 36415 COLL VENOUS BLD VENIPUNCTURE: CPT | Performed by: INTERNAL MEDICINE

## 2023-10-24 PROCEDURE — 3017F COLORECTAL CA SCREEN DOC REV: CPT | Performed by: INTERNAL MEDICINE

## 2023-10-24 PROCEDURE — G8427 DOCREV CUR MEDS BY ELIG CLIN: HCPCS | Performed by: INTERNAL MEDICINE

## 2023-10-24 RX ORDER — PREGABALIN 200 MG/1
200 CAPSULE ORAL 2 TIMES DAILY
COMMUNITY
Start: 2023-08-26

## 2023-10-24 RX ORDER — CETIRIZINE HYDROCHLORIDE 10 MG/1
10 TABLET ORAL DAILY
Qty: 90 TABLET | Refills: 1 | Status: SHIPPED
Start: 2023-10-24 | End: 2023-10-24 | Stop reason: SDUPTHER

## 2023-10-24 RX ORDER — CETIRIZINE HYDROCHLORIDE 10 MG/1
10 TABLET ORAL DAILY
Qty: 90 TABLET | Refills: 1 | Status: SHIPPED | OUTPATIENT
Start: 2023-10-24

## 2023-10-24 RX ORDER — PNV NO.95/FERROUS FUM/FOLIC AC 28MG-0.8MG
1 TABLET ORAL DAILY
Qty: 30 TABLET | Refills: 1 | Status: SHIPPED | OUTPATIENT
Start: 2023-10-24

## 2023-10-24 NOTE — PROGRESS NOTES
Active Problem List    Diagnosis Date Noted    Factor 5 Leiden mutation, heterozygous ([address]) 05/05/2022    Lumbar spondylosis 09/16/2019    Lumbar disc disorder 09/16/2019    Lumbar radiculopathy 09/16/2019    Chronic pain syndrome 09/16/2019    Spinal stenosis of lumbar region without neurogenic claudication 09/16/2019    Primary osteoarthritis of left hip 09/16/2019    Essential hypertension 02/12/2019    Class 3 severe obesity due to excess calories with serious comorbidity and body mass index (BMI) of 45.0 to 49.9 in adult [facility]) 02/12/2019    Other insomnia 02/12/2019    Anxiety 02/12/2019    Recurrent major depressive disorder, in remission ([address]) 02/12/2019    Idiopathic peripheral neuropathy 02/12/2019    Herniated lumbar intervertebral disc 02/12/2019    Type 2 diabetes mellitus with polyneuropathy (HCC)      Allergies   Allergen Reactions    Brexpiprazole Other (See Comments)    Cefdinir      Facial erythema     Clemastine      Other reaction(s): Loose voice    Neurontin [Gabapentin]     Theophyllines     Toradol [Ketorolac Tromethamine]      Face erythema     Vicodin Hp [Hydrocodone-Acetaminophen]     Wellbutrin [Bupropion]     Imodium [Loperamide] Nausea And Vomiting     Current Outpatient Medications on File Prior to Visit   Medication Sig Dispense Refill    pregabalin (LYRICA) 200 MG capsule Take 1 capsule by mouth 2 times daily.  Max Daily Amount: 400 mg      buPROPion (WELLBUTRIN SR) 150 MG extended release tablet       Continuous Blood Gluc Sensor (FREESTYLE SHELLEY 2 SENSOR) MISC Every 14 days 6 each 2    Continuous Blood Gluc  (FREESTYLE SHELLEY 2 READER) ALONSO 1 device 1 each 0    metoprolol succinate (TOPROL XL) 50 MG extended release tablet TAKE 1 TABLET EVERY DAY 90 tablet 1    atorvastatin (LIPITOR) 10 MG tablet TAKE 1 TABLET EVERY DAY 90 tablet 1    Blood Glucose Monitoring Suppl (ONE TOUCH ULTRA 2) w/Device KIT Use to check blood glucose 1 kit 0    blood glucose test strips (ONETOUCH ULTRA)

## 2023-12-29 ENCOUNTER — TELEPHONE (OUTPATIENT)
Dept: ENDOCRINOLOGY | Age: 53
End: 2023-12-29

## 2024-01-02 LAB — RHEUMATOID FACTOR: <10

## 2024-02-23 ENCOUNTER — TELEMEDICINE (OUTPATIENT)
Dept: PRIMARY CARE CLINIC | Age: 54
End: 2024-02-23
Payer: MEDICARE

## 2024-02-23 DIAGNOSIS — D50.9 MICROCYTIC ANEMIA: ICD-10-CM

## 2024-02-23 DIAGNOSIS — R26.81 GAIT INSTABILITY: ICD-10-CM

## 2024-02-23 DIAGNOSIS — I10 ESSENTIAL HYPERTENSION: ICD-10-CM

## 2024-02-23 DIAGNOSIS — R73.01 IFG (IMPAIRED FASTING GLUCOSE): ICD-10-CM

## 2024-02-23 DIAGNOSIS — G60.9 IDIOPATHIC PERIPHERAL NEUROPATHY: Primary | ICD-10-CM

## 2024-02-23 DIAGNOSIS — Z13.220 LIPID SCREENING: ICD-10-CM

## 2024-02-23 DIAGNOSIS — R41.3 MEMORY IMPAIRMENT: ICD-10-CM

## 2024-02-23 PROCEDURE — 99214 OFFICE O/P EST MOD 30 MIN: CPT | Performed by: INTERNAL MEDICINE

## 2024-02-23 NOTE — PROGRESS NOTES
2/23/24    Dimple Pabon, a female of 53 y.o. presents today for No chief complaint on file.    At last appointment she was advised to start a prenatal for hair loss.  She was referred to dermatology. She has been having gait instability, neuropathy and muscle twitching.         Diagnoses and all orders for this visit:  Idiopathic peripheral neuropathy  -     Duong Nuno, CNS, Neurology, [place]  Gait instability  -     Handicap Placard MISC; by Does not apply route Dx - Gait instability  Duration - 5 years from today's date  -     Duong Nuno, CNS, Neurology, [place]  -     CK; Future  Memory impairment  -     Duong Nuno, CNS, Neurology, [place]  IFG (impaired fasting glucose)  -     Hemoglobin A1C  -     CBC  -     Comprehensive Metabolic Panel  Lipid screening  -     Lipid Panel  Microcytic anemia  -     Iron and TIBC; Future  -     Ferritin; Future    Consider trial of treatment for restless legs  Request outside records from Neurology  Will refer to  Neurology for 2nd opinion     Dimple Pabon, was evaluated through a synchronous (real-time) audio-video encounter. The patient (or guardian if applicable) is aware that this is a billable service, which includes applicable co-pays. This Virtual Visit was conducted with patient's (and/or legal guardian's) consent. Patient identification was verified, and a caregiver was present when appropriate.   The patient was located at Home: [address] [place] [id number]  Provider was located at Home (Appt Dept State): [place]       Total time spent for this encounter: Not billed by time    --Zeus Allen DO on 2/23/2024 at 4:29 PM    An electronic signature was used to authenticate this note.         Electronically signed by Zeus Allen DO on 2/23/2024 at 4:29 PM                          There were no vitals taken for this visit.    Review of Systems  Constitutional:Negative for activity change, appetite change,

## 2024-02-27 ENCOUNTER — TELEPHONE (OUTPATIENT)
Dept: PRIMARY CARE CLINIC | Age: 54
End: 2024-02-27

## 2024-02-27 DIAGNOSIS — Z12.31 ENCOUNTER FOR SCREENING MAMMOGRAM FOR MALIGNANT NEOPLASM OF BREAST: Primary | ICD-10-CM

## 2024-02-27 NOTE — TELEPHONE ENCOUNTER
Order needed for mammo, pt scheudled for van in march. Pt also wanted a referral to an OB, hasn't had a pap in 8 years

## 2024-02-28 ENCOUNTER — TELEPHONE (OUTPATIENT)
Dept: PRIMARY CARE CLINIC | Age: 54
End: 2024-02-28

## 2024-02-28 DIAGNOSIS — Z12.31 ENCOUNTER FOR SCREENING MAMMOGRAM FOR MALIGNANT NEOPLASM OF BREAST: Primary | ICD-10-CM

## 2024-02-28 NOTE — TELEPHONE ENCOUNTER
Suresh's office got back to us, they do not take pts insurance, can a new referral be placed for another OB please?

## 2024-03-27 ENCOUNTER — HOSPITAL ENCOUNTER (OUTPATIENT)
Dept: MAMMOGRAPHY | Age: 54
Discharge: HOME | End: 2024-03-29

## 2024-03-27 DIAGNOSIS — Z12.31 ENCOUNTER FOR SCREENING MAMMOGRAM FOR MALIGNANT NEOPLASM OF BREAST: ICD-10-CM

## 2024-03-28 ENCOUNTER — TELEPHONE (OUTPATIENT)
Dept: PRIMARY CARE CLINIC | Age: 54
End: 2024-03-28

## 2024-03-28 NOTE — TELEPHONE ENCOUNTER
Pt came in to office and needs a handicap placard printed for 3 years, please print and sign. Will call pt to let her know it is ready. Pt would like to wait until her appointment next week to .

## 2024-04-05 ENCOUNTER — OFFICE VISIT (OUTPATIENT)
Dept: PRIMARY CARE CLINIC | Age: 54
End: 2024-04-05

## 2024-04-05 VITALS
DIASTOLIC BLOOD PRESSURE: 80 MMHG | WEIGHT: 175 LBS | SYSTOLIC BLOOD PRESSURE: 126 MMHG | BODY MASS INDEX: 34.18 KG/M2 | TEMPERATURE: 97.7 F | HEART RATE: 77 BPM | OXYGEN SATURATION: 97 %

## 2024-04-05 DIAGNOSIS — E55.9 VITAMIN D DEFICIENCY: ICD-10-CM

## 2024-04-05 DIAGNOSIS — R10.9 ABDOMINAL PAIN, UNSPECIFIED ABDOMINAL LOCATION: ICD-10-CM

## 2024-04-05 DIAGNOSIS — R53.83 FATIGUE, UNSPECIFIED TYPE: ICD-10-CM

## 2024-04-05 DIAGNOSIS — E78.00 ELEVATED CHOLESTEROL: ICD-10-CM

## 2024-04-05 DIAGNOSIS — E11.42 TYPE 2 DIABETES MELLITUS WITH POLYNEUROPATHY (HCC): ICD-10-CM

## 2024-04-05 DIAGNOSIS — Z13.220 LIPID SCREENING: ICD-10-CM

## 2024-04-05 DIAGNOSIS — R73.01 IFG (IMPAIRED FASTING GLUCOSE): Primary | ICD-10-CM

## 2024-04-05 DIAGNOSIS — Z12.4 CERVICAL CANCER SCREENING: ICD-10-CM

## 2024-04-05 RX ORDER — FERROUS SULFATE 325(65) MG
1 TABLET ORAL DAILY
COMMUNITY
Start: 2024-03-22

## 2024-04-05 RX ORDER — METOPROLOL SUCCINATE 50 MG/1
50 TABLET, EXTENDED RELEASE ORAL DAILY
Qty: 90 TABLET | Refills: 1 | Status: SHIPPED | OUTPATIENT
Start: 2024-04-05

## 2024-04-05 RX ORDER — BLOOD-GLUCOSE METER
EACH MISCELLANEOUS
Qty: 1 KIT | Refills: 0 | Status: SHIPPED | OUTPATIENT
Start: 2024-04-05

## 2024-04-05 RX ORDER — ATORVASTATIN CALCIUM 10 MG/1
10 TABLET, FILM COATED ORAL DAILY
Qty: 90 TABLET | Refills: 1 | Status: SHIPPED | OUTPATIENT
Start: 2024-04-05

## 2024-04-05 RX ORDER — DULOXETIN HYDROCHLORIDE 60 MG/1
CAPSULE, DELAYED RELEASE ORAL
COMMUNITY
Start: 2024-03-12

## 2024-04-05 RX ORDER — BLOOD SUGAR DIAGNOSTIC
STRIP MISCELLANEOUS
Qty: 100 EACH | Refills: 11 | Status: SHIPPED | OUTPATIENT
Start: 2024-04-05

## 2024-04-05 SDOH — ECONOMIC STABILITY: INCOME INSECURITY: HOW HARD IS IT FOR YOU TO PAY FOR THE VERY BASICS LIKE FOOD, HOUSING, MEDICAL CARE, AND HEATING?: NOT HARD AT ALL

## 2024-04-05 SDOH — ECONOMIC STABILITY: FOOD INSECURITY: WITHIN THE PAST 12 MONTHS, YOU WORRIED THAT YOUR FOOD WOULD RUN OUT BEFORE YOU GOT MONEY TO BUY MORE.: NEVER TRUE

## 2024-04-05 SDOH — ECONOMIC STABILITY: FOOD INSECURITY: WITHIN THE PAST 12 MONTHS, THE FOOD YOU BOUGHT JUST DIDN'T LAST AND YOU DIDN'T HAVE MONEY TO GET MORE.: NEVER TRUE

## 2024-04-05 ASSESSMENT — PATIENT HEALTH QUESTIONNAIRE - PHQ9
9. THOUGHTS THAT YOU WOULD BE BETTER OFF DEAD, OR OF HURTING YOURSELF: NOT AT ALL
10. IF YOU CHECKED OFF ANY PROBLEMS, HOW DIFFICULT HAVE THESE PROBLEMS MADE IT FOR YOU TO DO YOUR WORK, TAKE CARE OF THINGS AT HOME, OR GET ALONG WITH OTHER PEOPLE: EXTREMELY DIFFICULT
SUM OF ALL RESPONSES TO PHQ QUESTIONS 1-9: 19
SUM OF ALL RESPONSES TO PHQ QUESTIONS 1-9: 19
7. TROUBLE CONCENTRATING ON THINGS, SUCH AS READING THE NEWSPAPER OR WATCHING TELEVISION: NEARLY EVERY DAY
5. POOR APPETITE OR OVEREATING: NEARLY EVERY DAY
6. FEELING BAD ABOUT YOURSELF - OR THAT YOU ARE A FAILURE OR HAVE LET YOURSELF OR YOUR FAMILY DOWN: NEARLY EVERY DAY
SUM OF ALL RESPONSES TO PHQ QUESTIONS 1-9: 19
SUM OF ALL RESPONSES TO PHQ QUESTIONS 1-9: 19
1. LITTLE INTEREST OR PLEASURE IN DOING THINGS: NEARLY EVERY DAY
4. FEELING TIRED OR HAVING LITTLE ENERGY: NEARLY EVERY DAY
7. TROUBLE CONCENTRATING ON THINGS, SUCH AS READING THE NEWSPAPER OR WATCHING TELEVISION: NEARLY EVERY DAY
5. POOR APPETITE OR OVEREATING: NEARLY EVERY DAY
3. TROUBLE FALLING OR STAYING ASLEEP: SEVERAL DAYS
SUM OF ALL RESPONSES TO PHQ QUESTIONS 1-9: 19
1. LITTLE INTEREST OR PLEASURE IN DOING THINGS: NEARLY EVERY DAY
8. MOVING OR SPEAKING SO SLOWLY THAT OTHER PEOPLE COULD HAVE NOTICED. OR THE OPPOSITE, BEING SO FIGETY OR RESTLESS THAT YOU HAVE BEEN MOVING AROUND A LOT MORE THAN USUAL: NOT AT ALL
2. FEELING DOWN, DEPRESSED OR HOPELESS: NEARLY EVERY DAY
9. THOUGHTS THAT YOU WOULD BE BETTER OFF DEAD, OR OF HURTING YOURSELF: NOT AT ALL
8. MOVING OR SPEAKING SO SLOWLY THAT OTHER PEOPLE COULD HAVE NOTICED. OR THE OPPOSITE - BEING SO FIDGETY OR RESTLESS THAT YOU HAVE BEEN MOVING AROUND A LOT MORE THAN USUAL: NOT AT ALL
SUM OF ALL RESPONSES TO PHQ9 QUESTIONS 1 & 2: 6
4. FEELING TIRED OR HAVING LITTLE ENERGY: NEARLY EVERY DAY
3. TROUBLE FALLING OR STAYING ASLEEP: SEVERAL DAYS
2. FEELING DOWN, DEPRESSED OR HOPELESS: NEARLY EVERY DAY
6. FEELING BAD ABOUT YOURSELF - OR THAT YOU ARE A FAILURE OR HAVE LET YOURSELF OR YOUR FAMILY DOWN: NEARLY EVERY DAY
10. IF YOU CHECKED OFF ANY PROBLEMS, HOW DIFFICULT HAVE THESE PROBLEMS MADE IT FOR YOU TO DO YOUR WORK, TAKE CARE OF THINGS AT HOME, OR GET ALONG WITH OTHER PEOPLE: EXTREMELY DIFFICULT

## 2024-04-05 NOTE — PROGRESS NOTES
Take 1 capsule by mouth daily      aspirin 81 MG tablet Take 1 tablet by mouth daily      Handicap Placard MISC by Does not apply route Dx - Gait instability  Duration - 5 years from today's date 1 each 0    Handicap Placard MISC by Does not apply route Dx - Gait instability  Duration - 5 years from today's date 1 each 0    OneTouch Delica Lancets 33G MISC Check blood glucose once a day 100 each 11    dicyclomine (BENTYL) 20 MG tablet Take 1 tablet by mouth in the morning and 1 tablet at noon and 1 tablet in the evening and 1 tablet before bedtime. 120 tablet 2    albuterol sulfate HFA (PROVENTIL HFA) 108 (90 Base) MCG/ACT inhaler Inhale 2 puffs into the lungs every 4 hours as needed for Wheezing 3 each 1    Handicap Placard MISC by Does not apply route 1 each 0     No current facility-administered medications on file prior to visit.       Physical Exam   Constitutional:  Oriented to person, place, and time. Appears well-developed and well-nourished. No acute distress.   HENT: No sinus tenderness or lymphadenopathy  Head: Normocephalic and atraumatic.   Eyes: Eyes exhibits no discharge. No scleral icterus present.   Neck: No tracheal deviation present. No thyromegaly present.   Cardiovascular: Normal rate, regular rhythm, normal heart sounds and intact distal pulses.  Exam reveals no gallop nor friction rub.    No murmur heard.  Pulmonary: Effort normal and breath sounds normal. No respiratory distress. No wheezes or rales.    Abdomen: No signs of rigidity rebound or organomegaly  Musculoskeletal:  No tenderness to palpation  Neurological:Alert and oriented to person, place, and time.   Skin: No diaphoresis.   Psychiatric: Normal mood and affect. Behavior is Normal.     ASSESSMENT AND PLAN:        No follow-ups on file.    Electronically signed by Zeus Allen DO on 4/5/2024 at 11:07 AM    Zeus Allen DO    Assessment  Diagnoses and all orders for this visit:  IFG (impaired fasting glucose)  -

## 2024-04-12 ENCOUNTER — HOSPITAL ENCOUNTER (INPATIENT)
Age: 54
LOS: 5 days | Discharge: HOME | End: 2024-04-18
Attending: EMERGENCY MEDICINE | Admitting: HOSPITALIST
Payer: MEDICARE

## 2024-04-12 DIAGNOSIS — K65.1 INTRA-ABDOMINAL ABSCESS (HCC): Primary | ICD-10-CM

## 2024-04-12 DIAGNOSIS — R10.84 GENERALIZED ABDOMINAL PAIN: ICD-10-CM

## 2024-04-12 DIAGNOSIS — J45.909 ACUTE ASTHMA: ICD-10-CM

## 2024-04-12 LAB
ALBUMIN SERPL-MCNC: 3.7 G/DL (ref 3.5–5.2)
ALP SERPL-CCNC: 131 U/L (ref 35–104)
ALT SERPL-CCNC: 14 U/L (ref 0–32)
ANION GAP SERPL CALCULATED.3IONS-SCNC: 6 MMOL/L (ref 7–16)
AST SERPL-CCNC: 17 U/L (ref 0–31)
BACTERIA URNS QL MICRO: ABNORMAL
BASOPHILS # BLD: 0.03 K/UL (ref 0–0.2)
BASOPHILS NFR BLD: 0 % (ref 0–2)
BILIRUB SERPL-MCNC: 0.4 MG/DL (ref 0–1.2)
BILIRUB UR QL STRIP: NEGATIVE
BUN SERPL-MCNC: 7 MG/DL (ref 6–20)
CALCIUM SERPL-MCNC: 9.3 MG/DL (ref 8.6–10.2)
CHLORIDE SERPL-SCNC: 98 MMOL/L (ref 98–107)
CLARITY UR: CLEAR
CO2 SERPL-SCNC: 32 MMOL/L (ref 22–29)
COLOR UR: YELLOW
CREAT SERPL-MCNC: 0.6 MG/DL (ref 0.5–1)
CRYSTALS URNS MICRO: ABNORMAL /HPF
EOSINOPHIL # BLD: 0.24 K/UL (ref 0.05–0.5)
EOSINOPHILS RELATIVE PERCENT: 2 % (ref 0–6)
EPI CELLS #/AREA URNS HPF: ABNORMAL /HPF
ERYTHROCYTE [DISTWIDTH] IN BLOOD BY AUTOMATED COUNT: 12.3 % (ref 11.5–15)
GFR SERPL CREATININE-BSD FRML MDRD: >90 ML/MIN/1.73M2
GLUCOSE SERPL-MCNC: 90 MG/DL (ref 74–99)
GLUCOSE UR STRIP-MCNC: NEGATIVE MG/DL
HCT VFR BLD AUTO: 38.1 % (ref 34–48)
HGB BLD-MCNC: 12.2 G/DL (ref 11.5–15.5)
HGB UR QL STRIP.AUTO: NEGATIVE
IMM GRANULOCYTES # BLD AUTO: 0.04 K/UL (ref 0–0.58)
IMM GRANULOCYTES NFR BLD: 0 % (ref 0–5)
INFLUENZA A BY PCR: NOT DETECTED
INFLUENZA B BY PCR: NOT DETECTED
KETONES UR STRIP-MCNC: NEGATIVE MG/DL
LACTATE BLDV-SCNC: 0.7 MMOL/L (ref 0.5–2.2)
LEUKOCYTE ESTERASE UR QL STRIP: ABNORMAL
LIPASE SERPL-CCNC: 23 U/L (ref 13–60)
LYMPHOCYTES NFR BLD: 3.89 K/UL (ref 1.5–4)
LYMPHOCYTES RELATIVE PERCENT: 35 % (ref 20–42)
MCH RBC QN AUTO: 27.2 PG (ref 26–35)
MCHC RBC AUTO-ENTMCNC: 32 G/DL (ref 32–34.5)
MCV RBC AUTO: 84.9 FL (ref 80–99.9)
MONOCYTES NFR BLD: 0.74 K/UL (ref 0.1–0.95)
MONOCYTES NFR BLD: 7 % (ref 2–12)
NEUTROPHILS NFR BLD: 55 % (ref 43–80)
NEUTS SEG NFR BLD: 6.05 K/UL (ref 1.8–7.3)
NITRITE UR QL STRIP: NEGATIVE
PH UR STRIP: 6.5 [PH] (ref 5–9)
PLATELET # BLD AUTO: 474 K/UL (ref 130–450)
PMV BLD AUTO: 9 FL (ref 7–12)
POTASSIUM SERPL-SCNC: 4 MMOL/L (ref 3.5–5)
PROT SERPL-MCNC: 8.5 G/DL (ref 6.4–8.3)
PROT UR STRIP-MCNC: NEGATIVE MG/DL
RBC # BLD AUTO: 4.49 M/UL (ref 3.5–5.5)
RBC #/AREA URNS HPF: ABNORMAL /HPF
SARS-COV-2 RDRP RESP QL NAA+PROBE: NOT DETECTED
SODIUM SERPL-SCNC: 136 MMOL/L (ref 132–146)
SP GR UR STRIP: 1.01 (ref 1–1.03)
SPECIMEN DESCRIPTION: NORMAL
UROBILINOGEN UR STRIP-ACNC: 4 EU/DL (ref 0–1)
WBC #/AREA URNS HPF: ABNORMAL /HPF
WBC OTHER # BLD: 11 K/UL (ref 4.5–11.5)

## 2024-04-12 PROCEDURE — 80053 COMPREHEN METABOLIC PANEL: CPT

## 2024-04-12 PROCEDURE — 85025 COMPLETE CBC W/AUTO DIFF WBC: CPT

## 2024-04-12 PROCEDURE — 6360000002 HC RX W HCPCS: Performed by: EMERGENCY MEDICINE

## 2024-04-12 PROCEDURE — 99285 EMERGENCY DEPT VISIT HI MDM: CPT

## 2024-04-12 PROCEDURE — 87502 INFLUENZA DNA AMP PROBE: CPT

## 2024-04-12 PROCEDURE — 96375 TX/PRO/DX INJ NEW DRUG ADDON: CPT

## 2024-04-12 PROCEDURE — 87635 SARS-COV-2 COVID-19 AMP PRB: CPT

## 2024-04-12 PROCEDURE — 87086 URINE CULTURE/COLONY COUNT: CPT

## 2024-04-12 PROCEDURE — A4216 STERILE WATER/SALINE, 10 ML: HCPCS | Performed by: EMERGENCY MEDICINE

## 2024-04-12 PROCEDURE — 81001 URINALYSIS AUTO W/SCOPE: CPT

## 2024-04-12 PROCEDURE — 2500000003 HC RX 250 WO HCPCS: Performed by: EMERGENCY MEDICINE

## 2024-04-12 PROCEDURE — 83690 ASSAY OF LIPASE: CPT

## 2024-04-12 PROCEDURE — 83605 ASSAY OF LACTIC ACID: CPT

## 2024-04-12 PROCEDURE — 2580000003 HC RX 258: Performed by: EMERGENCY MEDICINE

## 2024-04-12 RX ORDER — LEVOFLOXACIN 5 MG/ML
750 INJECTION, SOLUTION INTRAVENOUS ONCE
Status: DISCONTINUED | OUTPATIENT
Start: 2024-04-13 | End: 2024-04-13

## 2024-04-12 RX ORDER — ONDANSETRON 2 MG/ML
4 INJECTION INTRAMUSCULAR; INTRAVENOUS ONCE
Status: COMPLETED | OUTPATIENT
Start: 2024-04-12 | End: 2024-04-12

## 2024-04-12 RX ORDER — MORPHINE SULFATE 4 MG/ML
4 INJECTION, SOLUTION INTRAMUSCULAR; INTRAVENOUS ONCE
Status: COMPLETED | OUTPATIENT
Start: 2024-04-12 | End: 2024-04-12

## 2024-04-12 RX ORDER — 0.9 % SODIUM CHLORIDE 0.9 %
1000 INTRAVENOUS SOLUTION INTRAVENOUS ONCE
Status: COMPLETED | OUTPATIENT
Start: 2024-04-12 | End: 2024-04-13

## 2024-04-12 RX ADMIN — ONDANSETRON 4 MG: 2 INJECTION INTRAMUSCULAR; INTRAVENOUS at 23:06

## 2024-04-12 RX ADMIN — MORPHINE SULFATE 4 MG: 4 INJECTION, SOLUTION INTRAMUSCULAR; INTRAVENOUS at 23:10

## 2024-04-12 RX ADMIN — FAMOTIDINE 20 MG: 10 INJECTION, SOLUTION INTRAVENOUS at 23:07

## 2024-04-12 RX ADMIN — SODIUM CHLORIDE 1000 ML: 9 INJECTION, SOLUTION INTRAVENOUS at 23:05

## 2024-04-12 ASSESSMENT — LIFESTYLE VARIABLES
HOW OFTEN DO YOU HAVE A DRINK CONTAINING ALCOHOL: NEVER
HOW MANY STANDARD DRINKS CONTAINING ALCOHOL DO YOU HAVE ON A TYPICAL DAY: PATIENT DOES NOT DRINK

## 2024-04-12 ASSESSMENT — PAIN SCALES - GENERAL: PAINLEVEL_OUTOF10: 4

## 2024-04-13 ENCOUNTER — APPOINTMENT (OUTPATIENT)
Dept: CT IMAGING | Age: 54
End: 2024-04-13
Payer: MEDICARE

## 2024-04-13 PROBLEM — K65.1 INTRA-ABDOMINAL ABSCESS (HCC): Status: ACTIVE | Noted: 2024-04-13

## 2024-04-13 PROBLEM — N30.00 ACUTE CYSTITIS WITHOUT HEMATURIA: Status: ACTIVE | Noted: 2024-04-13

## 2024-04-13 LAB
GLUCOSE BLD-MCNC: 173 MG/DL (ref 74–99)
GLUCOSE BLD-MCNC: 90 MG/DL (ref 74–99)
GLUCOSE BLD-MCNC: 94 MG/DL (ref 74–99)
GLUCOSE BLD-MCNC: 96 MG/DL (ref 74–99)
GLUCOSE BLD-MCNC: 99 MG/DL (ref 74–99)

## 2024-04-13 PROCEDURE — 6360000002 HC RX W HCPCS: Performed by: EMERGENCY MEDICINE

## 2024-04-13 PROCEDURE — 2580000003 HC RX 258: Performed by: STUDENT IN AN ORGANIZED HEALTH CARE EDUCATION/TRAINING PROGRAM

## 2024-04-13 PROCEDURE — 96365 THER/PROPH/DIAG IV INF INIT: CPT

## 2024-04-13 PROCEDURE — 74177 CT ABD & PELVIS W/CONTRAST: CPT

## 2024-04-13 PROCEDURE — 82962 GLUCOSE BLOOD TEST: CPT

## 2024-04-13 PROCEDURE — 6370000000 HC RX 637 (ALT 250 FOR IP): Performed by: HOSPITALIST

## 2024-04-13 PROCEDURE — 2580000003 HC RX 258: Performed by: HOSPITALIST

## 2024-04-13 PROCEDURE — 6360000004 HC RX CONTRAST MEDICATION: Performed by: RADIOLOGY

## 2024-04-13 PROCEDURE — 99223 1ST HOSP IP/OBS HIGH 75: CPT | Performed by: HOSPITALIST

## 2024-04-13 PROCEDURE — 6360000002 HC RX W HCPCS: Performed by: STUDENT IN AN ORGANIZED HEALTH CARE EDUCATION/TRAINING PROGRAM

## 2024-04-13 PROCEDURE — 1200000000 HC SEMI PRIVATE

## 2024-04-13 PROCEDURE — 6360000002 HC RX W HCPCS: Performed by: HOSPITALIST

## 2024-04-13 PROCEDURE — 96367 TX/PROPH/DG ADDL SEQ IV INF: CPT

## 2024-04-13 RX ORDER — DIPHENHYDRAMINE HCL 25 MG
50 TABLET ORAL 2 TIMES DAILY PRN
Status: DISCONTINUED | OUTPATIENT
Start: 2024-04-13 | End: 2024-04-18 | Stop reason: HOSPADM

## 2024-04-13 RX ORDER — ALBUTEROL SULFATE 2.5 MG/3ML
2.5 SOLUTION RESPIRATORY (INHALATION) EVERY 4 HOURS PRN
Status: DISCONTINUED | OUTPATIENT
Start: 2024-04-13 | End: 2024-04-18 | Stop reason: HOSPADM

## 2024-04-13 RX ORDER — SODIUM CHLORIDE, SODIUM LACTATE, POTASSIUM CHLORIDE, CALCIUM CHLORIDE 600; 310; 30; 20 MG/100ML; MG/100ML; MG/100ML; MG/100ML
INJECTION, SOLUTION INTRAVENOUS CONTINUOUS
Status: ACTIVE | OUTPATIENT
Start: 2024-04-13 | End: 2024-04-15

## 2024-04-13 RX ORDER — POLYETHYLENE GLYCOL 3350 17 G/17G
17 POWDER, FOR SOLUTION ORAL DAILY PRN
Status: DISCONTINUED | OUTPATIENT
Start: 2024-04-13 | End: 2024-04-18 | Stop reason: HOSPADM

## 2024-04-13 RX ORDER — M-VIT,TX,IRON,MINS/CALC/FOLIC 27MG-0.4MG
1 TABLET ORAL 2 TIMES DAILY
COMMUNITY

## 2024-04-13 RX ORDER — INSULIN LISPRO 100 [IU]/ML
0-8 INJECTION, SOLUTION INTRAVENOUS; SUBCUTANEOUS
Status: DISCONTINUED | OUTPATIENT
Start: 2024-04-13 | End: 2024-04-18 | Stop reason: HOSPADM

## 2024-04-13 RX ORDER — ONDANSETRON 2 MG/ML
4 INJECTION INTRAMUSCULAR; INTRAVENOUS EVERY 6 HOURS PRN
Status: DISCONTINUED | OUTPATIENT
Start: 2024-04-13 | End: 2024-04-18 | Stop reason: HOSPADM

## 2024-04-13 RX ORDER — METOPROLOL SUCCINATE 50 MG/1
50 TABLET, EXTENDED RELEASE ORAL DAILY
Status: DISCONTINUED | OUTPATIENT
Start: 2024-04-13 | End: 2024-04-16

## 2024-04-13 RX ORDER — ASCORBIC ACID 125 MG
1 TABLET,CHEWABLE ORAL DAILY
COMMUNITY

## 2024-04-13 RX ORDER — SODIUM CHLORIDE 9 MG/ML
INJECTION, SOLUTION INTRAVENOUS PRN
Status: DISCONTINUED | OUTPATIENT
Start: 2024-04-13 | End: 2024-04-18 | Stop reason: HOSPADM

## 2024-04-13 RX ORDER — BUSPIRONE HYDROCHLORIDE 10 MG/1
30 TABLET ORAL 2 TIMES DAILY
Status: DISCONTINUED | OUTPATIENT
Start: 2024-04-13 | End: 2024-04-18 | Stop reason: HOSPADM

## 2024-04-13 RX ORDER — DICYCLOMINE HCL 20 MG
20 TABLET ORAL EVERY 6 HOURS PRN
Qty: 20 TABLET | Refills: 0 | Status: SHIPPED | OUTPATIENT
Start: 2024-04-13

## 2024-04-13 RX ORDER — SODIUM CHLORIDE 0.9 % (FLUSH) 0.9 %
5-40 SYRINGE (ML) INJECTION EVERY 12 HOURS SCHEDULED
Status: DISCONTINUED | OUTPATIENT
Start: 2024-04-13 | End: 2024-04-18 | Stop reason: HOSPADM

## 2024-04-13 RX ORDER — GLUCAGON 1 MG/ML
1 KIT INJECTION PRN
Status: DISCONTINUED | OUTPATIENT
Start: 2024-04-13 | End: 2024-04-18 | Stop reason: HOSPADM

## 2024-04-13 RX ORDER — ONDANSETRON 4 MG/1
4 TABLET, ORALLY DISINTEGRATING ORAL EVERY 8 HOURS PRN
Status: DISCONTINUED | OUTPATIENT
Start: 2024-04-13 | End: 2024-04-18 | Stop reason: HOSPADM

## 2024-04-13 RX ORDER — OXYCODONE HYDROCHLORIDE AND ACETAMINOPHEN 5; 325 MG/1; MG/1
1 TABLET ORAL 2 TIMES DAILY PRN
Status: DISCONTINUED | OUTPATIENT
Start: 2024-04-13 | End: 2024-04-18 | Stop reason: HOSPADM

## 2024-04-13 RX ORDER — GLUCOSAMINE/D3/BOSWELLIA SERRA 1500MG-400
1 TABLET ORAL DAILY
COMMUNITY

## 2024-04-13 RX ORDER — CALCIUM CARBONATE 500(1250)
1 TABLET ORAL 2 TIMES DAILY
Status: DISCONTINUED | OUTPATIENT
Start: 2024-04-13 | End: 2024-04-18 | Stop reason: HOSPADM

## 2024-04-13 RX ORDER — ASPIRIN 81 MG/1
81 TABLET, CHEWABLE ORAL DAILY
Status: DISCONTINUED | OUTPATIENT
Start: 2024-04-13 | End: 2024-04-18 | Stop reason: HOSPADM

## 2024-04-13 RX ORDER — M-VIT,TX,IRON,MINS/CALC/FOLIC 27MG-0.4MG
1 TABLET ORAL 2 TIMES DAILY
Status: DISCONTINUED | OUTPATIENT
Start: 2024-04-13 | End: 2024-04-18 | Stop reason: HOSPADM

## 2024-04-13 RX ORDER — SODIUM CHLORIDE 0.9 % (FLUSH) 0.9 %
5-40 SYRINGE (ML) INJECTION PRN
Status: DISCONTINUED | OUTPATIENT
Start: 2024-04-13 | End: 2024-04-18 | Stop reason: HOSPADM

## 2024-04-13 RX ORDER — DEXTROSE MONOHYDRATE 100 MG/ML
INJECTION, SOLUTION INTRAVENOUS CONTINUOUS PRN
Status: DISCONTINUED | OUTPATIENT
Start: 2024-04-13 | End: 2024-04-18 | Stop reason: HOSPADM

## 2024-04-13 RX ORDER — SIMETHICONE 80 MG
160 TABLET,CHEWABLE ORAL
Status: DISCONTINUED | OUTPATIENT
Start: 2024-04-13 | End: 2024-04-18 | Stop reason: HOSPADM

## 2024-04-13 RX ORDER — PHENOL 1.4 %
1 AEROSOL, SPRAY (ML) MUCOUS MEMBRANE 2 TIMES DAILY
COMMUNITY

## 2024-04-13 RX ORDER — ACETAMINOPHEN 325 MG/1
650 TABLET ORAL EVERY 6 HOURS PRN
Status: DISCONTINUED | OUTPATIENT
Start: 2024-04-13 | End: 2024-04-18 | Stop reason: HOSPADM

## 2024-04-13 RX ORDER — GLUCOSAMINE/D3/BOSWELLIA SERRA 1500MG-400
1 TABLET ORAL DAILY
Status: DISCONTINUED | OUTPATIENT
Start: 2024-04-13 | End: 2024-04-13 | Stop reason: CLARIF

## 2024-04-13 RX ORDER — CETIRIZINE HYDROCHLORIDE 10 MG/1
10 TABLET ORAL DAILY
Status: DISCONTINUED | OUTPATIENT
Start: 2024-04-13 | End: 2024-04-18 | Stop reason: HOSPADM

## 2024-04-13 RX ORDER — LEVOFLOXACIN 750 MG/1
750 TABLET, FILM COATED ORAL DAILY
Qty: 5 TABLET | Refills: 0 | Status: SHIPPED | OUTPATIENT
Start: 2024-04-13 | End: 2024-04-18 | Stop reason: HOSPADM

## 2024-04-13 RX ORDER — ATORVASTATIN CALCIUM 10 MG/1
10 TABLET, FILM COATED ORAL DAILY
Status: DISCONTINUED | OUTPATIENT
Start: 2024-04-13 | End: 2024-04-18 | Stop reason: HOSPADM

## 2024-04-13 RX ORDER — ACETAMINOPHEN 650 MG/1
650 SUPPOSITORY RECTAL EVERY 6 HOURS PRN
Status: DISCONTINUED | OUTPATIENT
Start: 2024-04-13 | End: 2024-04-18 | Stop reason: HOSPADM

## 2024-04-13 RX ORDER — LANOLIN ALCOHOL/MO/W.PET/CERES
5000 CREAM (GRAM) TOPICAL DAILY
Status: DISCONTINUED | OUTPATIENT
Start: 2024-04-13 | End: 2024-04-18 | Stop reason: HOSPADM

## 2024-04-13 RX ADMIN — PIPERACILLIN AND TAZOBACTAM 4500 MG: 4; .5 INJECTION, POWDER, LYOPHILIZED, FOR SOLUTION INTRAVENOUS at 01:31

## 2024-04-13 RX ADMIN — BUSPIRONE HYDROCHLORIDE 30 MG: 10 TABLET ORAL at 08:08

## 2024-04-13 RX ADMIN — OXYCODONE AND ACETAMINOPHEN 1 TABLET: 5; 325 TABLET ORAL at 17:47

## 2024-04-13 RX ADMIN — Medication 1 TABLET: at 20:54

## 2024-04-13 RX ADMIN — Medication 1 TABLET: at 08:08

## 2024-04-13 RX ADMIN — ATORVASTATIN CALCIUM 10 MG: 10 TABLET, FILM COATED ORAL at 08:08

## 2024-04-13 RX ADMIN — METOPROLOL SUCCINATE 50 MG: 50 TABLET, FILM COATED, EXTENDED RELEASE ORAL at 08:08

## 2024-04-13 RX ADMIN — CETIRIZINE HYDROCHLORIDE 10 MG: 10 TABLET, FILM COATED ORAL at 08:08

## 2024-04-13 RX ADMIN — ASPIRIN 81 MG CHEWABLE TABLET 81 MG: 81 TABLET CHEWABLE at 08:08

## 2024-04-13 RX ADMIN — IOPAMIDOL 75 ML: 755 INJECTION, SOLUTION INTRAVENOUS at 00:32

## 2024-04-13 RX ADMIN — IOPAMIDOL 18 ML: 755 INJECTION, SOLUTION INTRAVENOUS at 00:32

## 2024-04-13 RX ADMIN — OXYCODONE AND ACETAMINOPHEN 1 TABLET: 5; 325 TABLET ORAL at 05:45

## 2024-04-13 RX ADMIN — DIPHENHYDRAMINE HCL 50 MG: 25 TABLET ORAL at 05:45

## 2024-04-13 RX ADMIN — PIPERACILLIN AND TAZOBACTAM 3375 MG: 3; .375 INJECTION, POWDER, LYOPHILIZED, FOR SOLUTION INTRAVENOUS at 17:43

## 2024-04-13 RX ADMIN — LEVOFLOXACIN 750 MG: 5 INJECTION, SOLUTION INTRAVENOUS at 01:05

## 2024-04-13 RX ADMIN — PIPERACILLIN AND TAZOBACTAM 3375 MG: 3; .375 INJECTION, POWDER, LYOPHILIZED, FOR SOLUTION INTRAVENOUS at 10:28

## 2024-04-13 RX ADMIN — BUSPIRONE HYDROCHLORIDE 30 MG: 10 TABLET ORAL at 20:54

## 2024-04-13 RX ADMIN — CALCIUM 500 MG: 500 TABLET ORAL at 20:54

## 2024-04-13 RX ADMIN — SIMETHICONE 160 MG: 80 TABLET, CHEWABLE ORAL at 11:11

## 2024-04-13 RX ADMIN — DIPHENHYDRAMINE HCL 50 MG: 25 TABLET ORAL at 17:47

## 2024-04-13 RX ADMIN — SODIUM CHLORIDE, POTASSIUM CHLORIDE, SODIUM LACTATE AND CALCIUM CHLORIDE: 600; 310; 30; 20 INJECTION, SOLUTION INTRAVENOUS at 04:40

## 2024-04-13 RX ADMIN — SIMETHICONE 160 MG: 80 TABLET, CHEWABLE ORAL at 15:49

## 2024-04-13 RX ADMIN — CYANOCOBALAMIN TAB 1000 MCG 5000 MCG: 1000 TAB at 08:08

## 2024-04-13 RX ADMIN — CALCIUM 500 MG: 500 TABLET ORAL at 08:08

## 2024-04-13 ASSESSMENT — PAIN SCALES - GENERAL
PAINLEVEL_OUTOF10: 6
PAINLEVEL_OUTOF10: 7
PAINLEVEL_OUTOF10: 5
PAINLEVEL_OUTOF10: 7

## 2024-04-13 ASSESSMENT — PAIN DESCRIPTION - DESCRIPTORS
DESCRIPTORS: ACHING;BURNING
DESCRIPTORS: BURNING;ACHING;DISCOMFORT;PRESSURE
DESCRIPTORS: BURNING;ACHING;SHOOTING
DESCRIPTORS: BURNING;DISCOMFORT

## 2024-04-13 ASSESSMENT — PAIN DESCRIPTION - LOCATION
LOCATION: ABDOMEN;BACK
LOCATION: ABDOMEN;BACK
LOCATION: ABDOMEN
LOCATION: ABDOMEN

## 2024-04-13 ASSESSMENT — PAIN - FUNCTIONAL ASSESSMENT
PAIN_FUNCTIONAL_ASSESSMENT: ACTIVITIES ARE NOT PREVENTED
PAIN_FUNCTIONAL_ASSESSMENT: ACTIVITIES ARE NOT PREVENTED

## 2024-04-13 ASSESSMENT — LIFESTYLE VARIABLES: HOW OFTEN DO YOU HAVE A DRINK CONTAINING ALCOHOL: NEVER

## 2024-04-13 ASSESSMENT — PAIN DESCRIPTION - ORIENTATION
ORIENTATION: MID;LEFT
ORIENTATION: MID
ORIENTATION: MID

## 2024-04-13 ASSESSMENT — PAIN DESCRIPTION - FREQUENCY
FREQUENCY: INTERMITTENT
FREQUENCY: INTERMITTENT

## 2024-04-13 ASSESSMENT — PAIN DESCRIPTION - ONSET
ONSET: ON-GOING
ONSET: ON-GOING

## 2024-04-13 ASSESSMENT — PAIN DESCRIPTION - PAIN TYPE
TYPE: ACUTE PAIN
TYPE: ACUTE PAIN

## 2024-04-13 NOTE — PROGRESS NOTES
4 Eyes Skin Assessment     NAME:  Dimple Pabon  YOB: 1970  MEDICAL RECORD NUMBER:  [id number]    The patient is being assessed for  Admission    I agree that at least one RN has performed a thorough Head to Toe Skin Assessment on the patient. ALL assessment sites listed below have been assessed.      Areas assessed by both nurses:    Head, Face, Ears, Shoulders, Back, Chest, Arms, Elbows, Hands, Sacrum. Buttock, Coccyx, Ischium, Legs. Feet and Heels, and Under Medical Devices         Does the Patient have a Wound? No noted wound(s)       Tony Prevention initiated by RN: No  Wound Care Orders initiated by RN: No    Pressure Injury (Stage 3,4, Unstageable, DTI, NWPT, and Complex wounds) if present, place Wound referral order by RN under : No    New Ostomies, if present place, Ostomy referral order under : No     Nurse 1 eSignature: Electronically signed by Latoya Rock RN on 4/13/24 at 5:52 AM EDT    **SHARE this note so that the co-signing nurse can place an eSignature**    Nurse 2 eSignature: Electronically signed by Daniella Pool RN on 4/13/24 at 7:07 AM EDT

## 2024-04-13 NOTE — ED NOTES
ED to Inpatient Handoff Report    Notified Latoya RN that electronic handoff available and patient ready for transport to room 0502.    Safety Risks: Risk of falls    Patient in Restraints: no    Constant Observer or Patient : no    Telemetry Monitoring Ordered :No           Order to transfer to unit without monitor:N/A    Last MEWS: 1 Time completed: 0327    Deterioration Index Score:   Predictive Model Details          18 (Normal)  Factor Value    Calculated 4/13/2024 03:27 64% Age 53 years old    Deterioration Index Model 16% Respiratory rate 18     12% WBC count 11.0 k/uL     4% Sodium 136 mmol/L     3% Platelet count abnormal (474 k/uL)     1% Potassium 4.0 mmol/L     0% Pulse oximetry 95 %     0% Systolic 111     0% Temperature 98.2 °F (36.8 °C)     0% Hematocrit 38.1 %     0% Pulse 72        Vitals:    04/12/24 2100 04/13/24 0327   BP: 115/65 111/73   Pulse: 81 72   Resp: 18 18   Temp: 99.1 °F (37.3 °C) 98.2 °F (36.8 °C)   TempSrc: Oral Oral   SpO2: 96% 95%   Weight: 77.6 kg (171 lb)    Height: 1.524 m (5')          Opportunity for questions and clarification was provided.

## 2024-04-13 NOTE — DISCHARGE INSTRUCTIONS
Call family doctor and surgery and schedule a followup appointment to be seen in 3 days    Have your doctor obtain  finalized report of CT scan today    Results for orders placed or performed during the hospital encounter of 04/12/24   COVID-19, Rapid    Specimen: Nasopharyngeal Swab   Result Value Ref Range    Specimen Description .NASOPHARYNGEAL SWAB     SARS-CoV-2, Rapid Not Detected Not Detected   Influenza A+B, PCR    Specimen: Nasopharyngeal   Result Value Ref Range    Influenza A by PCR Not Detected Not Detected    Influenza B by PCR Not Detected Not Detected   CBC with Auto Differential   Result Value Ref Range    WBC 11.0 4.5 - 11.5 k/uL    RBC 4.49 3.50 - 5.50 m/uL    Hemoglobin 12.2 11.5 - 15.5 g/dL    Hematocrit 38.1 34.0 - 48.0 %    MCV 84.9 80.0 - 99.9 fL    MCH 27.2 26.0 - 35.0 pg    MCHC 32.0 32.0 - 34.5 g/dL    RDW 12.3 11.5 - 15.0 %    Platelets 474 (H) 130 - 450 k/uL    MPV 9.0 7.0 - 12.0 fL    Neutrophils % 55 43.0 - 80.0 %    Lymphocytes % 35 20.0 - 42.0 %    Monocytes % 7 2.0 - 12.0 %    Eosinophils % 2 0 - 6 %    Basophils % 0 0.0 - 2.0 %    Immature Granulocytes % 0 0.0 - 5.0 %    Neutrophils Absolute 6.05 1.80 - 7.30 k/uL    Lymphocytes Absolute 3.89 1.50 - 4.00 k/uL    Monocytes Absolute 0.74 0.10 - 0.95 k/uL    Eosinophils Absolute 0.24 0.05 - 0.50 k/uL    Basophils Absolute 0.03 0.00 - 0.20 k/uL    Immature Granulocytes Absolute 0.04 0.00 - 0.58 k/uL   Lipase   Result Value Ref Range    Lipase 23 13 - 60 U/L   Lactic Acid   Result Value Ref Range    Lactic Acid 0.7 0.5 - 2.2 mmol/L   CMP   Result Value Ref Range    Sodium 136 132 - 146 mmol/L    Potassium 4.0 3.5 - 5.0 mmol/L    Chloride 98 98 - 107 mmol/L    CO2 32 (H) 22 - 29 mmol/L    Anion Gap 6 (L) 7 - 16 mmol/L    Glucose 90 74 - 99 mg/dL    BUN 7 6 - 20 mg/dL    Creatinine 0.6 0.50 - 1.00 mg/dL    Est, Glom Filt Rate >90 >60 mL/min/1.73m2    Calcium 9.3 8.6 - 10.2 mg/dL    Total Protein 8.5 (H) 6.4 - 8.3 g/dL    Albumin 3.7 3.5 -  5.2 g/dL    Total Bilirubin 0.4 0.0 - 1.2 mg/dL    Alkaline Phosphatase 131 (H) 35 - 104 U/L    ALT 14 0 - 32 U/L    AST 17 0 - 31 U/L   Urinalysis with Microscopic   Result Value Ref Range    Color, UA Yellow Yellow    Turbidity UA Clear Clear    Glucose, Ur NEGATIVE NEGATIVE mg/dL    Bilirubin Urine NEGATIVE NEGATIVE    Ketones, Urine NEGATIVE NEGATIVE mg/dL    Specific Gravity, UA 1.015 1.005 - 1.030    Urine Hgb NEGATIVE NEGATIVE    pH, UA 6.5 5.0 - 9.0    Protein, UA NEGATIVE NEGATIVE mg/dL    Urobilinogen, Urine 4.0 (H) 0.0 - 1.0 EU/dL    Nitrite, Urine NEGATIVE NEGATIVE    Leukocyte Esterase, Urine MODERATE (A) NEGATIVE    WBC, UA 10 TO 20 (A) 0 TO 5 /HPF    RBC, UA 0 TO 2 0 TO 2 /HPF    Crystals, UA 1+ CALCIUM OXALATE (A) None /HPF    Epithelial Cells UA 0 TO 2 /HPF    Bacteria, UA 1+ (A) None       [facility]  ([facility])  [address] [place] [address]  Phone [phone]   Fax [phone]        Justino Rodriguez MD, FACP, [facility]      MD Yadira Anton MD Indra P. Limbu, MD Shirisha Pasula MD    STANDING ORDERS (“ID Protocol”)     Visiting nurses are to write the Primary Care Physician and their own call back number on all laboratory requisition forms.   Abnormal lab values are called to the physician by the nurse and NOT by the laboratory.   Fax all labs to the office in a timely manner, during office hours. All faxes should include nurse’s name and call back number.  Vascular Access Devices or VADs (TLC, PICC, Midline, etc) will be replaced as necessary.  Draw all blood work from VADs, except for drug levels.  If unable to access a VAD, insert a peripheral catheter temporarily. Contact the Primary Care Physician or [facility] office for surgical referral.  Use tPA (Cathflo®, Alteplase®) as per agency protocol to restore patency of VAD.  Remove VAD upon completion of IV antibiotics, unless otherwise specified

## 2024-04-13 NOTE — PLAN OF CARE
Nocturnist note reviewed, agree with plan and additions as below:    Exam  General Appearance: alert and oriented to person, place and time and in NAD, laying in bed comfortably  Skin: warm and dry  Head: normocephalic and atraumatic  Eyes: PERRL, EOMI, conjunctivae normal  Neck: neck supple, trachea midline   Pulmonary/Chest: CTAB, no w/r/r, normal air movement, no respiratory distress  Cardiovascular: RRR, no murmurs  Abdomen: soft, mild periumbilical tenderness with palpable firmness uncertain if hernia vs collection  Extremities: no cyanosis, no clubbing and no edema  Neurologic: no cranial nerve deficit and speech normal    A/P  Intra-abdominal abscess vs hernia- cont zosyn for now. CT showing 4.6cm collection periumbilically c/f abscess with intraperitoneal extension.Gen Surg c/s and suspect infectious/inflammatory process vs hernia, will need eventual diagnostic laparoscopy  Pyuria- UA w/ LE, cont abx as above for now, unsure if true infection, no urinary sxs reported, follow UCx  DM2- hold oral meds. SSI and adjust as indicated, ACHS checks, hypoglycemia protocol   HTN- cont home meds, adjust as indicated   Chronic pain      NOTE: Portions of this report may have been transcribed using voice recognition software. Every effort was made to ensure accuracy; however, inadvertent computerized transcription errors may be present.  Electronically signed by Rob Richard MD on 4/13/2024 at 12:59 PM

## 2024-04-13 NOTE — ED NOTES
1:13 AM EDT  I received this patient at sign out from Dr. Marvin.  I have discussed the patient's initial exam, treatment and plan of care with the out going physician.  I have introduced my self to the patient / family and have answered their questions to this point.  I have examined the patient myself and reviewed ordered tests / medications and  reviewed any available results to this point.  If a resident is involved in the Emergency Department care, I have discussed my findings and plan with them as well.      ED Course as of 24 0337   Sat 2024   0111 1:11 AM EDT  I received this patient at sign out from Dr. Marvin.  I have discussed the patient's initial exam, treatment and plan of care with the out going physician.  I have introduced my self to the patient / family and have answered their questions to this point.  I have examined the patient myself and reviewed ordered tests / medications and  reviewed any available results to this point.  If a resident is involved in the Emergency Department care, I have discussed my findings and plan with them as well.   [KG]   0111 Canceled the Levaquin order and ordered Zosyn instead.  Patient has evidence of an abscess that is extending into the intraperitoneal area.  Confirmed with patient that she has tolerated penicillins in the past.  States that she is not allergic to penicillins and is able to tolerate them and does not have a history of allergic reaction to penicillin in the past.  She states that she had gastric bypass performed at [facility] before along with  but no other abdominal surgeries.  She states that her abdomen has been bothering her over the past several days and she has had intermittent fevers at home.  Patient treated with IV antibiotics and general surgery consult placed.  She has not seen a local general surgeon. Surgeon on call, Dr. Jey smith.  [KG]   0245 Spoke with Dr. Khanna, surgery, who agreed to consult on

## 2024-04-13 NOTE — PLAN OF CARE
Problem: Pain  Goal: Verbalizes/displays adequate comfort level or baseline comfort level  4/13/2024 1016 by Loyda Reilly RN  Outcome: Progressing  4/13/2024 0438 by Latoya Rock RN  Outcome: Not Progressing     Problem: Gastrointestinal - Adult  Goal: Maintains adequate nutritional intake  4/13/2024 0438 by Latoya Rock RN  Outcome: Not Progressing

## 2024-04-13 NOTE — H&P
ABDOMEN AND PELVIS WITH CONTRAST 4/13/2024 12:32 am TECHNIQUE: CT of the abdomen and pelvis was performed with the administration of intravenous contrast. Multiplanar reformatted images are provided for review. Automated exposure control, iterative reconstruction, and/or weight based adjustment of the mA/kV was utilized to reduce the radiation dose to as low as reasonably achievable. COMPARISON: None. HISTORY: ORDERING SYSTEM PROVIDED HISTORY: abdominal pain hsx of ventral wall hernia TECHNOLOGIST PROVIDED HISTORY: Additional Contrast?->Oral Reason for exam:->abdominal pain hsx of ventral wall hernia Decision Support Exception - unselect if not a suspected or confirmed emergency medical condition->Emergency Medical Condition (MA) FINDINGS: Lower Chest: No infiltrate or effusion. Organs: Gallbladder sludge noted.  Otherwise, the liver, biliary tree, bilateral adrenal glands, bilateral kidneys, spleen and pancreas are normal. GI/Bowel: No ileus or obstruction.  No inflammatory bowel process.  Appendix not visible.  No pericecal inflammatory change to suggest occult appendicitis. Desiccated stool distension of the cecum and proximal colon indicating constipation/fecal stasis. Pelvis: No adnexal mass or pelvic fluid. Peritoneum/Retroperitoneum: Normal caliber and contour of the aorta.  No adenopathy or ascites.  4.1 x 3.1 x 4.6 cm thick-walled air and fluid collection which is present at the left periumbilical deep subcutaneous tissues with moderate intraperitoneal extension, appearance concerning for a small subcutaneous periumbilical abscess formation with intraperitoneal extension. Bones/Soft Tissues: No acute osseous abnormality.  Right periumbilical abdominal wall and intraperitoneal abscess formation as above.  Otherwise, body wall soft tissues unremarkable.     4.6 cm thick-walled air and fluid collection which is present at the left periumbilical anterior abdominal deep subcutaneous tissues with moderate  intraperitoneal extension, appearance concerning for a small subcutaneous periumbilical abscess formation with intraperitoneal extension.       ASSESSMENT & PLAN::    Principal Problem:    Intra-abdominal abscess (HCC)  Active Problems:    Essential hypertension    Type 2 diabetes mellitus with polyneuropathy (HCC)    Chronic pain syndrome    Acute cystitis without hematuria  Resolved Problems:    * No resolved hospital problems. *    - continue IV zosyn   - NPO, continue IV fluid with LR 75cc/hr  - monitor glucose and sliding scale coverage as needed  - General surgery consulted,   - Urine culture ordered  - resumed home asa 81mg,  lipitor 10mg, toprol XL 50mg,   - PT/OT consult      DVT Prophylaxis: []Lovenox []Heparin []PCD [] Warfarin/NOAC [x]Encouraged ambulation    Diet: Diet NPO Exceptions are: Ice Chips, Sips of Water with Meds  Code Status: Full Code  Surrogate decision maker confirmed with patient:   Extended Emergency Contact Information  Primary Emergency Contact: CmPayam M  Address: [address] [place] [address]  Mobile Phone: [phone]  Relation: Spouse    Admit status: [] Observation [x] Inpatient   Disposition: [x]Med/Surg [] Intermediate [] ICU/CCU    +++++++++++++++++++++++++++++++++++++++++++++++++  Johnny Page MD PhD  [facility]     +++++++++++++++++++++++++++++++++++++++++++++++++  NOTE: Report could be transcribed using voice recognition software. Every effort was made to ensure accuracy; however, inadvertent computerized transcription errors may be present.

## 2024-04-13 NOTE — CONSULTS
Consultation    Patient's Name/Date of Birth: Dimple Pabon / 1970    Date: April 13, 2024     PCP: Zeus Allen DO     Reason for consult:    CT evidence of a abscess involving the abdominal wall      History of Present Illness:    The patient is a 53-year-old white female who states that she has had abdominal pain for approximately 3 weeks.  The patient complains of a low grade fever.  The patient denies anorexia, nausea and vomiting.  The patient had a Spike-en-Y gastric bypass in [place] 1 year ago.  The patient has lost approximately 50+ pounds.  The patient denies any other significant associated symptomatology.  The patient had a CT of the abdomen and pelvis that showed a fluid collection involving the anterior abdominal wall.  The patient has a normal white blood cell count of 11.0.  The patient has a normal differential.  The patient states that her bowel habits are normal.      Past Medical History:   Diagnosis Date    Asthma     Cirrhosis of liver (HCC)     DM (diabetes mellitus) (HCC)     GERD (gastroesophageal reflux disease)     History of gastric bypass 04/10/2023    Hypertension     IBS (irritable bowel syndrome)     OCD (obsessive compulsive disorder)     Sleep apnea     Stomach cancer (HCC)       Past Surgical History:   Procedure Laterality Date    COLONOSCOPY      EPIDURAL STEROID INJECTION Bilateral 10/3/2019    BILATERAL L5 - S1 TRANSFORAMINAL EPIDURAL STEROID INJECTION performed by Celio Quintana MD at [facility] OR    NERVE BLOCK Bilateral 10/03/2019    BILATERAL L5-S1 TRANSFORAMINAL EPIDURAL STEROID INJECTION    UPPER GASTROINTESTINAL ENDOSCOPY      stomach cancer removed      Allergies: Brexpiprazole, Cefdinir, Clemastine, Neurontin [gabapentin], Theophyllines, Toradol [ketorolac tromethamine], Vicodin hp [hydrocodone-acetaminophen], and Imodium [loperamide]     Current Facility-Administered Medications   Medication Dose Route Frequency Provider Last Rate Last Admin    albuterol

## 2024-04-13 NOTE — PLAN OF CARE
Problem: Pain  Goal: Verbalizes/displays adequate comfort level or baseline comfort level  Outcome: Not Progressing     Problem: Respiratory - Adult  Goal: Achieves optimal ventilation and oxygenation  Outcome: Progressing     Problem: Musculoskeletal - Adult  Goal: Return mobility to safest level of function  Outcome: Progressing     Problem: Gastrointestinal - Adult  Goal: Minimal or absence of nausea and vomiting  Outcome: Progressing     Problem: Gastrointestinal - Adult  Goal: Maintains adequate nutritional intake  Outcome: Not Progressing     Problem: Genitourinary - Adult  Goal: Absence of urinary retention  Outcome: Progressing     Problem: Pain  Goal: Verbalizes/displays adequate comfort level or baseline comfort level  Outcome: Not Progressing     Problem: Gastrointestinal - Adult  Goal: Maintains adequate nutritional intake  Outcome: Not Progressing

## 2024-04-13 NOTE — ED PROVIDER NOTES
2.5 mg (has no administration in time range)   aspirin chewable tablet 81 mg (81 mg Oral Given 4/14/24 1009)   atorvastatin (LIPITOR) tablet 10 mg (10 mg Oral Given 4/14/24 1009)   busPIRone (BUSPAR) tablet 30 mg (30 mg Oral Given 4/14/24 2047)   cetirizine (ZYRTEC) tablet 10 mg (10 mg Oral Given 4/14/24 1009)   metoprolol succinate (TOPROL XL) extended release tablet 50 mg (50 mg Oral Given 4/14/24 1009)   oxyCODONE-acetaminophen (PERCOCET) 5-325 MG per tablet 1 tablet (1 tablet Oral Given 4/14/24 2049)   sodium chloride flush 0.9 % injection 5-40 mL (10 mLs IntraVENous Given 4/14/24 2048)   sodium chloride flush 0.9 % injection 5-40 mL (10 mLs IntraVENous Given 4/14/24 0846)   0.9 % sodium chloride infusion (has no administration in time range)   ondansetron (ZOFRAN-ODT) disintegrating tablet 4 mg (4 mg Oral Given 4/14/24 1008)     Or   ondansetron (ZOFRAN) injection 4 mg ( IntraVENous See Alternative 4/14/24 1008)   acetaminophen (TYLENOL) tablet 650 mg (has no administration in time range)     Or   acetaminophen (TYLENOL) suppository 650 mg (has no administration in time range)   polyethylene glycol (GLYCOLAX) packet 17 g (has no administration in time range)   lactated ringers IV soln infusion ( IntraVENous New Bag 4/14/24 0658)   piperacillin-tazobactam (ZOSYN) 3,375 mg in sodium chloride 0.9 % 50 mL IVPB (0 mg IntraVENous Stopped 4/15/24 0524)   insulin lispro (HUMALOG) injection vial 0-8 Units (2 Units SubCUTAneous Given 4/14/24 2046)   calcium elemental (OSCAL) tablet 500 mg (500 mg Oral Given 4/14/24 2047)   vitamin B-12 (CYANOCOBALAMIN) tablet 5,000 mcg (5,000 mcg Oral Given 4/14/24 1010)   therapeutic multivitamin-minerals 1 tablet (1 tablet Oral Given 4/14/24 2047)   simethicone (MYLICON) chewable tablet 160 mg (160 mg Oral Given 4/14/24 1708)   dextrose bolus 10% 125 mL (has no administration in time range)     Or   dextrose bolus 10% 250 mL (has no administration in time range)   glucagon injection 1  in the past.  States that she is not allergic to penicillins and is able to tolerate them and does not have a history of allergic reaction to penicillin in the past.  She states that she had gastric bypass performed at [facility] before along with  but no other abdominal surgeries.  She states that her abdomen has been bothering her over the past several days and she has had intermittent fevers at home.  Patient treated with IV antibiotics and general surgery consult placed.  She has not seen a local general surgeon. Surgeon on call, Dr. Jey smith.  [KG]   0245 Spoke with Dr. Khanna, surgery, who agreed to consult on the patient. Patient will be admitted to medicine.  [KG]   0247 Spoke with Dr. Page, hospitalist, who accepted for admission.  [KG]      ED Course User Index  [KG] April Gray DO        [unfilled]    Chronic Conditions:   Active Ambulatory Problems     Diagnosis Date Noted    Essential hypertension 2019    Type 2 diabetes mellitus with polyneuropathy ([facility])     Class 3 severe obesity due to excess calories with serious comorbidity and body mass index (BMI) of 45.0 to 49.9 in adult ([facility]) 2019    Other insomnia 2019    Anxiety 2019    Recurrent major depressive disorder, in remission ([facility]) 2019    Idiopathic peripheral neuropathy 2019    Herniated lumbar intervertebral disc 2019    Lumbar spondylosis 2019    Lumbar disc disorder 2019    Lumbar radiculopathy 2019    Chronic pain syndrome 2019    Spinal stenosis of lumbar region without neurogenic claudication 2019    Primary osteoarthritis of left hip 2019    Factor 5 Leiden mutation, heterozygous ([facility]) 2022     Resolved Ambulatory Problems     Diagnosis Date Noted    No Resolved Ambulatory Problems     Past Medical History:   Diagnosis Date    Asthma     Cirrhosis of liver ([facility])     DM (diabetes mellitus) ([facility])     GERD (gastroesophageal

## 2024-04-14 LAB
ALBUMIN SERPL-MCNC: 3.2 G/DL (ref 3.5–5.2)
ALP SERPL-CCNC: 106 U/L (ref 35–104)
ALT SERPL-CCNC: 11 U/L (ref 0–32)
ANION GAP SERPL CALCULATED.3IONS-SCNC: 7 MMOL/L (ref 7–16)
AST SERPL-CCNC: 15 U/L (ref 0–31)
BASOPHILS # BLD: 0.03 K/UL (ref 0–0.2)
BASOPHILS NFR BLD: 0 % (ref 0–2)
BILIRUB SERPL-MCNC: 0.4 MG/DL (ref 0–1.2)
BUN SERPL-MCNC: 6 MG/DL (ref 6–20)
CALCIUM SERPL-MCNC: 9.1 MG/DL (ref 8.6–10.2)
CHLORIDE SERPL-SCNC: 99 MMOL/L (ref 98–107)
CO2 SERPL-SCNC: 31 MMOL/L (ref 22–29)
CREAT SERPL-MCNC: 0.6 MG/DL (ref 0.5–1)
EOSINOPHIL # BLD: 0.34 K/UL (ref 0.05–0.5)
EOSINOPHILS RELATIVE PERCENT: 4 % (ref 0–6)
ERYTHROCYTE [DISTWIDTH] IN BLOOD BY AUTOMATED COUNT: 12.3 % (ref 11.5–15)
GFR SERPL CREATININE-BSD FRML MDRD: >90 ML/MIN/1.73M2
GLUCOSE BLD-MCNC: 137 MG/DL (ref 74–99)
GLUCOSE BLD-MCNC: 156 MG/DL (ref 74–99)
GLUCOSE BLD-MCNC: 229 MG/DL (ref 74–99)
GLUCOSE BLD-MCNC: 99 MG/DL (ref 74–99)
GLUCOSE SERPL-MCNC: 160 MG/DL (ref 74–99)
HCT VFR BLD AUTO: 34.5 % (ref 34–48)
HGB BLD-MCNC: 11 G/DL (ref 11.5–15.5)
IMM GRANULOCYTES # BLD AUTO: <0.03 K/UL (ref 0–0.58)
IMM GRANULOCYTES NFR BLD: 0 % (ref 0–5)
LYMPHOCYTES NFR BLD: 3.37 K/UL (ref 1.5–4)
LYMPHOCYTES RELATIVE PERCENT: 40 % (ref 20–42)
MCH RBC QN AUTO: 27.2 PG (ref 26–35)
MCHC RBC AUTO-ENTMCNC: 31.9 G/DL (ref 32–34.5)
MCV RBC AUTO: 85.4 FL (ref 80–99.9)
MICROORGANISM SPEC CULT: NO GROWTH
MONOCYTES NFR BLD: 0.56 K/UL (ref 0.1–0.95)
MONOCYTES NFR BLD: 7 % (ref 2–12)
NEUTROPHILS NFR BLD: 49 % (ref 43–80)
NEUTS SEG NFR BLD: 4.17 K/UL (ref 1.8–7.3)
PLATELET # BLD AUTO: 360 K/UL (ref 130–450)
PMV BLD AUTO: 8.9 FL (ref 7–12)
POTASSIUM SERPL-SCNC: 4.2 MMOL/L (ref 3.5–5)
PROT SERPL-MCNC: 7.1 G/DL (ref 6.4–8.3)
RBC # BLD AUTO: 4.04 M/UL (ref 3.5–5.5)
SODIUM SERPL-SCNC: 137 MMOL/L (ref 132–146)
SPECIMEN DESCRIPTION: NORMAL
WBC OTHER # BLD: 8.5 K/UL (ref 4.5–11.5)

## 2024-04-14 PROCEDURE — 6360000002 HC RX W HCPCS: Performed by: HOSPITALIST

## 2024-04-14 PROCEDURE — 6370000000 HC RX 637 (ALT 250 FOR IP): Performed by: HOSPITALIST

## 2024-04-14 PROCEDURE — 99232 SBSQ HOSP IP/OBS MODERATE 35: CPT | Performed by: STUDENT IN AN ORGANIZED HEALTH CARE EDUCATION/TRAINING PROGRAM

## 2024-04-14 PROCEDURE — 36415 COLL VENOUS BLD VENIPUNCTURE: CPT

## 2024-04-14 PROCEDURE — 80053 COMPREHEN METABOLIC PANEL: CPT

## 2024-04-14 PROCEDURE — 82962 GLUCOSE BLOOD TEST: CPT

## 2024-04-14 PROCEDURE — 85025 COMPLETE CBC W/AUTO DIFF WBC: CPT

## 2024-04-14 PROCEDURE — 2580000003 HC RX 258: Performed by: HOSPITALIST

## 2024-04-14 PROCEDURE — 6370000000 HC RX 637 (ALT 250 FOR IP): Performed by: STUDENT IN AN ORGANIZED HEALTH CARE EDUCATION/TRAINING PROGRAM

## 2024-04-14 PROCEDURE — 1200000000 HC SEMI PRIVATE

## 2024-04-14 RX ORDER — BUPROPION HYDROCHLORIDE 150 MG/1
150 TABLET, EXTENDED RELEASE ORAL 2 TIMES DAILY
Status: DISCONTINUED | OUTPATIENT
Start: 2024-04-14 | End: 2024-04-18 | Stop reason: HOSPADM

## 2024-04-14 RX ORDER — DULOXETIN HYDROCHLORIDE 60 MG/1
60 CAPSULE, DELAYED RELEASE ORAL 2 TIMES DAILY
Status: DISCONTINUED | OUTPATIENT
Start: 2024-04-14 | End: 2024-04-18 | Stop reason: HOSPADM

## 2024-04-14 RX ADMIN — ONDANSETRON 4 MG: 4 TABLET, ORALLY DISINTEGRATING ORAL at 10:08

## 2024-04-14 RX ADMIN — CYANOCOBALAMIN TAB 1000 MCG 5000 MCG: 1000 TAB at 10:10

## 2024-04-14 RX ADMIN — SODIUM CHLORIDE, PRESERVATIVE FREE 10 ML: 5 INJECTION INTRAVENOUS at 08:46

## 2024-04-14 RX ADMIN — PIPERACILLIN AND TAZOBACTAM 3375 MG: 3; .375 INJECTION, POWDER, LYOPHILIZED, FOR SOLUTION INTRAVENOUS at 10:21

## 2024-04-14 RX ADMIN — CALCIUM 500 MG: 500 TABLET ORAL at 20:47

## 2024-04-14 RX ADMIN — CALCIUM 500 MG: 500 TABLET ORAL at 10:10

## 2024-04-14 RX ADMIN — SODIUM CHLORIDE, POTASSIUM CHLORIDE, SODIUM LACTATE AND CALCIUM CHLORIDE: 600; 310; 30; 20 INJECTION, SOLUTION INTRAVENOUS at 06:58

## 2024-04-14 RX ADMIN — ASPIRIN 81 MG CHEWABLE TABLET 81 MG: 81 TABLET CHEWABLE at 10:09

## 2024-04-14 RX ADMIN — OXYCODONE AND ACETAMINOPHEN 1 TABLET: 5; 325 TABLET ORAL at 02:12

## 2024-04-14 RX ADMIN — Medication 1 TABLET: at 20:47

## 2024-04-14 RX ADMIN — ATORVASTATIN CALCIUM 10 MG: 10 TABLET, FILM COATED ORAL at 10:09

## 2024-04-14 RX ADMIN — SIMETHICONE 160 MG: 80 TABLET, CHEWABLE ORAL at 17:02

## 2024-04-14 RX ADMIN — DULOXETINE HYDROCHLORIDE 60 MG: 60 CAPSULE, DELAYED RELEASE ORAL at 15:00

## 2024-04-14 RX ADMIN — BUSPIRONE HYDROCHLORIDE 30 MG: 10 TABLET ORAL at 20:47

## 2024-04-14 RX ADMIN — DIPHENHYDRAMINE HCL 50 MG: 25 TABLET ORAL at 02:12

## 2024-04-14 RX ADMIN — SODIUM CHLORIDE, PRESERVATIVE FREE 10 ML: 5 INJECTION INTRAVENOUS at 08:45

## 2024-04-14 RX ADMIN — SODIUM CHLORIDE, PRESERVATIVE FREE 10 ML: 5 INJECTION INTRAVENOUS at 20:48

## 2024-04-14 RX ADMIN — Medication 1 TABLET: at 10:09

## 2024-04-14 RX ADMIN — BUSPIRONE HYDROCHLORIDE 30 MG: 10 TABLET ORAL at 10:09

## 2024-04-14 RX ADMIN — DIPHENHYDRAMINE HCL 50 MG: 25 TABLET ORAL at 20:48

## 2024-04-14 RX ADMIN — CETIRIZINE HYDROCHLORIDE 10 MG: 10 TABLET, FILM COATED ORAL at 10:09

## 2024-04-14 RX ADMIN — INSULIN LISPRO 2 UNITS: 100 INJECTION, SOLUTION INTRAVENOUS; SUBCUTANEOUS at 20:46

## 2024-04-14 RX ADMIN — BUPROPION HYDROCHLORIDE 150 MG: 150 TABLET, FILM COATED, EXTENDED RELEASE ORAL at 15:00

## 2024-04-14 RX ADMIN — OXYCODONE AND ACETAMINOPHEN 1 TABLET: 5; 325 TABLET ORAL at 20:49

## 2024-04-14 RX ADMIN — SIMETHICONE 160 MG: 80 TABLET, CHEWABLE ORAL at 06:20

## 2024-04-14 RX ADMIN — PIPERACILLIN AND TAZOBACTAM 3375 MG: 3; .375 INJECTION, POWDER, LYOPHILIZED, FOR SOLUTION INTRAVENOUS at 18:26

## 2024-04-14 RX ADMIN — METOPROLOL SUCCINATE 50 MG: 50 TABLET, FILM COATED, EXTENDED RELEASE ORAL at 10:09

## 2024-04-14 RX ADMIN — SODIUM CHLORIDE, PRESERVATIVE FREE 10 ML: 5 INJECTION INTRAVENOUS at 10:10

## 2024-04-14 RX ADMIN — PREGABALIN 200 MG: 75 CAPSULE ORAL at 15:00

## 2024-04-14 RX ADMIN — PIPERACILLIN AND TAZOBACTAM 3375 MG: 3; .375 INJECTION, POWDER, LYOPHILIZED, FOR SOLUTION INTRAVENOUS at 02:12

## 2024-04-14 RX ADMIN — SIMETHICONE 160 MG: 80 TABLET, CHEWABLE ORAL at 10:34

## 2024-04-14 ASSESSMENT — PAIN DESCRIPTION - DESCRIPTORS
DESCRIPTORS: ACHING;DISCOMFORT;SORE
DESCRIPTORS: CRUSHING;DISCOMFORT

## 2024-04-14 ASSESSMENT — PAIN DESCRIPTION - ORIENTATION: ORIENTATION: MID

## 2024-04-14 ASSESSMENT — PAIN SCALES - GENERAL
PAINLEVEL_OUTOF10: 8
PAINLEVEL_OUTOF10: 7

## 2024-04-14 ASSESSMENT — PAIN DESCRIPTION - LOCATION
LOCATION: ABDOMEN
LOCATION: BACK

## 2024-04-14 ASSESSMENT — PAIN - FUNCTIONAL ASSESSMENT: PAIN_FUNCTIONAL_ASSESSMENT: ACTIVITIES ARE NOT PREVENTED

## 2024-04-14 NOTE — PROGRESS NOTES
Progress note:  Overall, the patient states that her pain is improved.  The patient states that her appetite and bowel function are normal.  Afebrile  No recorded bowel movement.  Physical examination:  The abdomen is soft, nondistended with mild supraumbilical tenderness without guarding or rebound.  No skin changes are noted to involve the area.  Assessment:  Likely small bowel perforation associated with an incarcerated incisional hernia.  Plan:  Continue Zosyn  Ultimately, diagnostic laparoscopy to further evaluate the clinical findings.  Fidel Khanna MD

## 2024-04-14 NOTE — PLAN OF CARE
Problem: Pain  Goal: Verbalizes/displays adequate comfort level or baseline comfort level  Outcome: Progressing     Problem: Skin/Tissue Integrity - Adult  Goal: Skin integrity remains intact  Outcome: Progressing     Problem: Musculoskeletal - Adult  Goal: Return mobility to safest level of function  Outcome: Progressing     Problem: Gastrointestinal - Adult  Goal: Minimal or absence of nausea and vomiting  Outcome: Progressing

## 2024-04-14 NOTE — CARE COORDINATION
Social Work/Discharge Planning:  Received notification patient requesting Health Care Power of  forms.  Met with patient and provided her with Health Care Power of  and Living Will forms.  Electronically signed by LUCERO Carroll on 4/14/2024 at 12:48 PM

## 2024-04-14 NOTE — PROGRESS NOTES
[facility] Hospitalist Progress Note    Admitting Date and Time: 4/12/2024 10:33 PM  Admit Dx: Generalized abdominal pain [R10.84]  Intra-abdominal abscess (HCC) [K65.1]    Subjective:  Patient is being followed for Generalized abdominal pain [R10.84]  Intra-abdominal abscess (HCC) [K65.1]   Pt feels okay  Per RN: no additional concerns    ROS: denies fever, chills, cp, sob, n/v, HA unless stated above.      buPROPion  150 mg Oral BID    pregabalin  200 mg Oral BID    DULoxetine  60 mg Oral BID    aspirin  81 mg Oral Daily    atorvastatin  10 mg Oral Daily    busPIRone  30 mg Oral BID    cetirizine  10 mg Oral Daily    metoprolol succinate  50 mg Oral Daily    sodium chloride flush  5-40 mL IntraVENous 2 times per day    piperacillin-tazobactam  3,375 mg IntraVENous Q8H    insulin lispro  0-8 Units SubCUTAneous 4x Daily WC    calcium elemental  1 tablet Oral BID    vitamin B-12  5,000 mcg Oral Daily    therapeutic multivitamin-minerals  1 tablet Oral BID    simethicone  160 mg Oral TID AC     albuterol, 2.5 mg, Q4H PRN  oxyCODONE-acetaminophen, 1 tablet, BID PRN  sodium chloride flush, 5-40 mL, PRN  sodium chloride, , PRN  ondansetron, 4 mg, Q8H PRN   Or  ondansetron, 4 mg, Q6H PRN  acetaminophen, 650 mg, Q6H PRN   Or  acetaminophen, 650 mg, Q6H PRN  polyethylene glycol, 17 g, Daily PRN  dextrose bolus, 125 mL, PRN   Or  dextrose bolus, 250 mL, PRN  glucagon (rDNA), 1 mg, PRN  dextrose, , Continuous PRN  Glucose, 15 g, PRN  diphenhydrAMINE, 50 mg, BID PRN         Objective:  BP (!) 112/55   Pulse 62   Temp 98.6 °F (37 °C) (Oral)   Resp 16   Ht 1.524 m (5')   Wt 90.5 kg (199 lb 8.3 oz)   LMP  (LMP Unknown)   SpO2 98%   BMI 38.97 kg/m²     General Appearance: alert and oriented to person, place and time and in NAD, sitting in bed  Skin: warm and dry  Head: normocephalic and atraumatic  Eyes: PERRL, EOMI, conjunctivae normal  Neck: neck supple, trachea midline   Pulmonary/Chest: CTAB, no w/r/r, normal  wellbutrin, buspar    Today's exam/findings/plan  -resume home anxiolytics and chronic pain meds  -tolerating diet, some abdominal discomfort/bloating after meals  -remains HDS, will need eventual surgical evaluation    NOTE: Portions of this report may have been transcribed using voice recognition software. Every effort was made to ensure accuracy; however, inadvertent computerized transcription errors may be present.  Electronically signed by Rob Richard MD on 4/14/2024 at 1:18 PM

## 2024-04-15 LAB
GLUCOSE BLD-MCNC: 178 MG/DL (ref 74–99)
GLUCOSE BLD-MCNC: 209 MG/DL (ref 74–99)
GLUCOSE BLD-MCNC: 66 MG/DL (ref 74–99)
GLUCOSE BLD-MCNC: 93 MG/DL (ref 74–99)

## 2024-04-15 PROCEDURE — 2580000003 HC RX 258: Performed by: HOSPITALIST

## 2024-04-15 PROCEDURE — 6370000000 HC RX 637 (ALT 250 FOR IP): Performed by: STUDENT IN AN ORGANIZED HEALTH CARE EDUCATION/TRAINING PROGRAM

## 2024-04-15 PROCEDURE — 6360000002 HC RX W HCPCS: Performed by: HOSPITALIST

## 2024-04-15 PROCEDURE — 6370000000 HC RX 637 (ALT 250 FOR IP): Performed by: HOSPITALIST

## 2024-04-15 PROCEDURE — 82962 GLUCOSE BLOOD TEST: CPT

## 2024-04-15 PROCEDURE — 97161 PT EVAL LOW COMPLEX 20 MIN: CPT

## 2024-04-15 PROCEDURE — 1200000000 HC SEMI PRIVATE

## 2024-04-15 PROCEDURE — 99232 SBSQ HOSP IP/OBS MODERATE 35: CPT | Performed by: STUDENT IN AN ORGANIZED HEALTH CARE EDUCATION/TRAINING PROGRAM

## 2024-04-15 RX ADMIN — SIMETHICONE 160 MG: 80 TABLET, CHEWABLE ORAL at 10:45

## 2024-04-15 RX ADMIN — PREGABALIN 200 MG: 75 CAPSULE ORAL at 20:49

## 2024-04-15 RX ADMIN — OXYCODONE AND ACETAMINOPHEN 1 TABLET: 5; 325 TABLET ORAL at 22:14

## 2024-04-15 RX ADMIN — BUSPIRONE HYDROCHLORIDE 30 MG: 10 TABLET ORAL at 08:14

## 2024-04-15 RX ADMIN — CYANOCOBALAMIN TAB 1000 MCG 5000 MCG: 1000 TAB at 08:16

## 2024-04-15 RX ADMIN — DIPHENHYDRAMINE HCL 50 MG: 25 TABLET ORAL at 22:14

## 2024-04-15 RX ADMIN — PREGABALIN 200 MG: 75 CAPSULE ORAL at 08:15

## 2024-04-15 RX ADMIN — ASPIRIN 81 MG CHEWABLE TABLET 81 MG: 81 TABLET CHEWABLE at 08:16

## 2024-04-15 RX ADMIN — PIPERACILLIN AND TAZOBACTAM 3375 MG: 3; .375 INJECTION, POWDER, LYOPHILIZED, FOR SOLUTION INTRAVENOUS at 01:01

## 2024-04-15 RX ADMIN — BUPROPION HYDROCHLORIDE 150 MG: 150 TABLET, FILM COATED, EXTENDED RELEASE ORAL at 20:49

## 2024-04-15 RX ADMIN — BUSPIRONE HYDROCHLORIDE 30 MG: 10 TABLET ORAL at 20:49

## 2024-04-15 RX ADMIN — CALCIUM 500 MG: 500 TABLET ORAL at 20:49

## 2024-04-15 RX ADMIN — Medication 1 TABLET: at 20:49

## 2024-04-15 RX ADMIN — BUPROPION HYDROCHLORIDE 150 MG: 150 TABLET, FILM COATED, EXTENDED RELEASE ORAL at 08:16

## 2024-04-15 RX ADMIN — DULOXETINE HYDROCHLORIDE 60 MG: 60 CAPSULE, DELAYED RELEASE ORAL at 20:49

## 2024-04-15 RX ADMIN — Medication 1 TABLET: at 08:15

## 2024-04-15 RX ADMIN — DULOXETINE HYDROCHLORIDE 60 MG: 60 CAPSULE, DELAYED RELEASE ORAL at 08:16

## 2024-04-15 RX ADMIN — SODIUM CHLORIDE, PRESERVATIVE FREE 10 ML: 5 INJECTION INTRAVENOUS at 20:50

## 2024-04-15 RX ADMIN — SIMETHICONE 160 MG: 80 TABLET, CHEWABLE ORAL at 06:10

## 2024-04-15 RX ADMIN — CETIRIZINE HYDROCHLORIDE 10 MG: 10 TABLET, FILM COATED ORAL at 08:15

## 2024-04-15 RX ADMIN — ATORVASTATIN CALCIUM 10 MG: 10 TABLET, FILM COATED ORAL at 08:16

## 2024-04-15 RX ADMIN — PIPERACILLIN AND TAZOBACTAM 3375 MG: 3; .375 INJECTION, POWDER, LYOPHILIZED, FOR SOLUTION INTRAVENOUS at 18:07

## 2024-04-15 RX ADMIN — INSULIN LISPRO 2 UNITS: 100 INJECTION, SOLUTION INTRAVENOUS; SUBCUTANEOUS at 15:51

## 2024-04-15 RX ADMIN — PIPERACILLIN AND TAZOBACTAM 3375 MG: 3; .375 INJECTION, POWDER, LYOPHILIZED, FOR SOLUTION INTRAVENOUS at 10:45

## 2024-04-15 RX ADMIN — CALCIUM 500 MG: 500 TABLET ORAL at 08:16

## 2024-04-15 RX ADMIN — SIMETHICONE 160 MG: 80 TABLET, CHEWABLE ORAL at 15:49

## 2024-04-15 ASSESSMENT — PAIN DESCRIPTION - ORIENTATION: ORIENTATION: LOWER;MID

## 2024-04-15 ASSESSMENT — PAIN SCALES - GENERAL
PAINLEVEL_OUTOF10: 9
PAINLEVEL_OUTOF10: 2

## 2024-04-15 ASSESSMENT — PAIN DESCRIPTION - DESCRIPTORS: DESCRIPTORS: ACHING;SHARP

## 2024-04-15 ASSESSMENT — PAIN DESCRIPTION - LOCATION: LOCATION: BACK

## 2024-04-15 ASSESSMENT — PAIN - FUNCTIONAL ASSESSMENT: PAIN_FUNCTIONAL_ASSESSMENT: PREVENTS OR INTERFERES SOME ACTIVE ACTIVITIES AND ADLS

## 2024-04-15 NOTE — PROGRESS NOTES
Physical Therapy  Facility/Department: [address] MED SURG  Physical Therapy Initial Assessment    Name: Dimple Pabon  : 1970  MRN: [id number]  Date of Service: 4/15/2024    Attending Provider:  Rob Richard MD    Evaluating PT:  Andrew Henderson Jr., P.T.    Room #:  0502/0502-A  Diagnosis:  Generalized abdominal pain [R10.84]  Intra-abdominal abscess (HCC) [K65.1]  Precautions:  falls    SUBJECTIVE:    Pt lives with her  and 8 y.o. son in a 2 story home with 1 step and door jam to hold onto to enter.  She has 1st floor bed and bath.  Pt ambulated with no AD, but states she does use rollator ww PRN.    OBJECTIVE:   Initial Evaluation  Date: 4/15/24 Treatment Short Term/ Long Term   Goals   Was pt agreeable to Eval/treatment? yes     Does pt have pain? C/o chronic LBP     Bed Mobility  Rolling: Independent  Supine to sit: Independent  Sit to supine: Independent  Scooting: Independent  Independent   Transfers Sit to stand: Independent  Stand to sit: Independent  Stand pivot: Independent  Independent   Ambulation   200 feet with no AD, but holding onto rail in sumner with SBA  350 feet with ww if needed Independent    Stair negotiation: ascended and descended NA  1 step with 1 rail Independent    AM-PAC 6 Clicks        Pt is A & O x 4  BLE ROM is WFL.   BLE strength is grossly 4/5 to 4+/5.   Sensation:  Pt c/o neuropathy B feet  Balance: sitting is Independent and standing with no AD is Independent   Endurance: fair+    ASSESSMENT:    Conditions Requiring Skilled Therapeutic Intervention:    [x]Decreased strength     []Decreased ROM  [x]Decreased functional mobility  [x]Decreased balance   [x]Decreased endurance   []Decreased posture  [x]Decreased sensation  []Decreased coordination   []Decreased vision  []Decreased safety awareness   [x]Increased pain       Comments:  Pt was found standing near EOB Independently.  She sat on EOB and then was agreeable to amb in the sumner.  She walked with no AD, but held

## 2024-04-15 NOTE — PROGRESS NOTES
General Surgery   Daily Progress Note      Patient's Name/Date of Birth: Dimple Pabno / 1970    Date: April 15, 2024     Chief Complaint: Abdominal pain nausea     Patient Active Problem List   Diagnosis    Essential hypertension    Type 2 diabetes mellitus with polyneuropathy ([facility])    Class 3 severe obesity due to excess calories with serious comorbidity and body mass index (BMI) of 45.0 to 49.9 in adult (HCC)    Other insomnia    Anxiety    Recurrent major depressive disorder, in remission (HCC)    Idiopathic peripheral neuropathy    Herniated lumbar intervertebral disc    Lumbar spondylosis    Lumbar disc disorder    Lumbar radiculopathy    Chronic pain syndrome    Spinal stenosis of lumbar region without neurogenic claudication    Primary osteoarthritis of left hip    Factor 5 Leiden mutation, heterozygous (HCC)    Intra-abdominal abscess ([facility])    Acute cystitis without hematuria       Subjective: Minimal pain today. No nausea or emesis. Abdomen soft minimally tender. No BM    No fevers   sbp , wbc 8.5 yest.     Objective:  BP (!) 95/52   Pulse 68   Temp 98.6 °F (37 °C) (Oral)   Resp 18   Ht 1.524 m (5')   Wt 90.5 kg (199 lb 8.3 oz)   LMP  (LMP Unknown)   SpO2 95%   BMI 38.97 kg/m²   Labs:  Recent Labs     04/12/24  2240 04/14/24  0825   WBC 11.0 8.5   HGB 12.2 11.0*   HCT 38.1 34.5     Lab Results   Component Value Date    CREATININE 0.6 04/14/2024    BUN 6 04/14/2024     04/14/2024    K 4.2 04/14/2024    CL 99 04/14/2024    CO2 31 (H) 04/14/2024     Recent Labs     04/12/24  2240   LIPASE 23       General appearance:  NAD  Head: NCAT, PERRLA, EOMI, red conjunctiva  Neck: supple, no masses  Lungs: symmetric chest rise, no audible wheezes  Heart: normal rate per peripheral pulse  Abdomen: soft, nondistended, non tender   Skin: no visible lesions  Gu: no cva tenderness  Extremities: extremities normal, atraumatic, no cyanosis or edema      Assessment/Plan:  Dimple Pabon is a 53 y.o.  female with pelvic abscess extending into abdominal wall likely 2/2 perforated small bowel assoc w/ incarcerated incisional hernia    Will require diagnostic lap and evaluation of the bowel with possible resection and repair of defect, timing to be discussed with Dr Jey LOPEZ pain and nausea control   Continue Zosyn   Diet as tolerated for now   Ambulate as able   Monitor wbc, abdominal examination - no skin changes to date       Natalie Barry MD  PGY-3 General Surgery Resident  The patient was seen and examined and the chart was reviewed.  Overall, the patient is doing well.  The patient denies nausea and vomiting.  The patient states that her pain is improved.  The abdomen is soft, nondistended and less tender.  Assessment:   Perforated small bowel with intra-abdominal abscess  Plan:  Zosyn  Repeat CT of the abdomen pelvis with oral and IV contrast tomorrow.  Fidel Khanna MD

## 2024-04-15 NOTE — CARE COORDINATION
Introduced my self and provided explanation of CM role to patient. Patient is awake, alert, and aware of current diagnosis and discharge planning. Patient is from home independently with her  and son. Patient has a walker and a glucometer at home. PCP is Dr. Allen. Preferred pharmacy is [facility] in [place]. Patient still drives. Discharge plan is home with no needs.  will provide transport at discharge.   Electronically signed by Michelle Marie RN on 4/15/2024 at 9:59 AM

## 2024-04-15 NOTE — PATIENT CARE CONFERENCE
P Quality Flow/Interdisciplinary Rounds Progress Note        Quality Flow Rounds held on April 15, 2024    Disciplines Attending:  Bedside Nurse, , , and Nursing Unit Leadership    Dimple Pabon was admitted on 4/12/2024 10:33 PM    Anticipated Discharge Date:       Disposition:    Tony Score:  Tony Scale Score: 21    Readmission Risk              Risk of Unplanned Readmission:  10           Discussed patient goal for the day, patient clinical progression, and barriers to discharge.  The following Goal(s) of the Day/Commitment(s) have been identified:  Diagnostics - Report Results check surgical plan?      Rachel Scott RN  April 15, 2024

## 2024-04-15 NOTE — PLAN OF CARE
Problem: Pain  Goal: Verbalizes/displays adequate comfort level or baseline comfort level  Outcome: Progressing     Problem: Respiratory - Adult  Goal: Achieves optimal ventilation and oxygenation  Outcome: Progressing     Problem: Skin/Tissue Integrity - Adult  Goal: Skin integrity remains intact  Outcome: Progressing

## 2024-04-15 NOTE — PROGRESS NOTES
Patient's blood glucose 66. Gave apple juice and lacho crackers. Electronically signed by Fior Lazaro RN on 4/15/2024 at 11:04 AM

## 2024-04-15 NOTE — PROGRESS NOTES
[facility] Hospitalist Progress Note    Admitting Date and Time: 4/12/2024 10:33 PM  Admit Dx: Generalized abdominal pain [R10.84]  Intra-abdominal abscess (HCC) [K65.1]    Subjective:  Patient is being followed for Generalized abdominal pain [R10.84]  Intra-abdominal abscess (HCC) [K65.1]   Pt feels okay  Per RN: no additional concerns    ROS: denies fever, chills, cp, sob, n/v, HA unless stated above.      buPROPion  150 mg Oral BID    pregabalin  200 mg Oral BID    DULoxetine  60 mg Oral BID    aspirin  81 mg Oral Daily    atorvastatin  10 mg Oral Daily    busPIRone  30 mg Oral BID    cetirizine  10 mg Oral Daily    metoprolol succinate  50 mg Oral Daily    sodium chloride flush  5-40 mL IntraVENous 2 times per day    piperacillin-tazobactam  3,375 mg IntraVENous Q8H    insulin lispro  0-8 Units SubCUTAneous 4x Daily WC    calcium elemental  1 tablet Oral BID    vitamin B-12  5,000 mcg Oral Daily    therapeutic multivitamin-minerals  1 tablet Oral BID    simethicone  160 mg Oral TID AC     albuterol, 2.5 mg, Q4H PRN  oxyCODONE-acetaminophen, 1 tablet, BID PRN  sodium chloride flush, 5-40 mL, PRN  sodium chloride, , PRN  ondansetron, 4 mg, Q8H PRN   Or  ondansetron, 4 mg, Q6H PRN  acetaminophen, 650 mg, Q6H PRN   Or  acetaminophen, 650 mg, Q6H PRN  polyethylene glycol, 17 g, Daily PRN  dextrose bolus, 125 mL, PRN   Or  dextrose bolus, 250 mL, PRN  glucagon (rDNA), 1 mg, PRN  dextrose, , Continuous PRN  Glucose, 15 g, PRN  diphenhydrAMINE, 50 mg, BID PRN         Objective:  BP (!) 102/55   Pulse 62   Temp 98 °F (36.7 °C) (Oral)   Resp 14   Ht 1.524 m (5')   Wt 90.5 kg (199 lb 8.3 oz)   LMP  (LMP Unknown)   SpO2 96%   BMI 38.97 kg/m²     General Appearance: alert and oriented to person, place and time and in NAD, sitting on side of bed  Skin: warm and dry  Head: normocephalic and atraumatic  Eyes: PERRL, EOMI, conjunctivae normal  Neck: neck supple, trachea midline   Pulmonary/Chest: CTAB, no w/r/r,  normal air movement, no respiratory distress  Cardiovascular: RRR, no murmurs  Abdomen: soft, mild periumbilical tenderness with palpable firmness uncertain if hernia vs collection without overlying skin changes  Extremities: no cyanosis, no clubbing and no edema  Neurologic: no cranial nerve deficit and speech normal      Recent Labs     04/12/24 2240 04/14/24  0825    137   K 4.0 4.2   CL 98 99   CO2 32* 31*   BUN 7 6   CREATININE 0.6 0.6   GLUCOSE 90 160*   CALCIUM 9.3 9.1         Recent Labs     04/12/24 2240 04/14/24  0825   WBC 11.0 8.5   RBC 4.49 4.04   HGB 12.2 11.0*   HCT 38.1 34.5   MCV 84.9 85.4   MCH 27.2 27.2   MCHC 32.0 31.9*   RDW 12.3 12.3   * 360   MPV 9.0 8.9         Radiology:  CT ABDOMEN PELVIS W IV CONTRAST Additional Contrast? Oral   Final Result   4.6 cm thick-walled air and fluid collection which is present at the left   periumbilical anterior abdominal deep subcutaneous tissues with moderate   intraperitoneal extension, appearance concerning for a small subcutaneous   periumbilical abscess formation with intraperitoneal extension.              Assessment:  Principal Problem:    Intra-abdominal abscess (HCC)  Active Problems:    Essential hypertension    Type 2 diabetes mellitus with polyneuropathy (HCC)    Chronic pain syndrome    Acute cystitis without hematuria  Resolved Problems:    * No resolved hospital problems. *      Plan:  Intra-abdominal abscess vs hernia- cont zosyn for now. CT showing 4.6cm collection periumbilically c/f abscess with intraperitoneal extension.Gen Surg c/s and suspect infectious/inflammatory process vs perforated hernia, will need eventual diagnostic laparoscopy  Pyuria- UA w/ LE, cont abx as above for now, unsure if true infection, no urinary sxs reported, follow UCx  DM2- hold oral meds. SSI and adjust as indicated, ACHS checks, hypoglycemia protocol   HTN- cont home meds, adjust as indicated   Chronic pain- cont lyrica, cymbalta  Depression- cont

## 2024-04-16 ENCOUNTER — APPOINTMENT (OUTPATIENT)
Dept: CT IMAGING | Age: 54
End: 2024-04-16
Payer: MEDICARE

## 2024-04-16 LAB
GLUCOSE BLD-MCNC: 154 MG/DL (ref 74–99)
GLUCOSE BLD-MCNC: 280 MG/DL (ref 74–99)
GLUCOSE BLD-MCNC: 83 MG/DL (ref 74–99)
GLUCOSE BLD-MCNC: 87 MG/DL (ref 74–99)

## 2024-04-16 PROCEDURE — 1200000000 HC SEMI PRIVATE

## 2024-04-16 PROCEDURE — 6360000004 HC RX CONTRAST MEDICATION: Performed by: RADIOLOGY

## 2024-04-16 PROCEDURE — 82962 GLUCOSE BLOOD TEST: CPT

## 2024-04-16 PROCEDURE — 6370000000 HC RX 637 (ALT 250 FOR IP): Performed by: STUDENT IN AN ORGANIZED HEALTH CARE EDUCATION/TRAINING PROGRAM

## 2024-04-16 PROCEDURE — 6370000000 HC RX 637 (ALT 250 FOR IP): Performed by: HOSPITALIST

## 2024-04-16 PROCEDURE — 2580000003 HC RX 258: Performed by: HOSPITALIST

## 2024-04-16 PROCEDURE — 97165 OT EVAL LOW COMPLEX 30 MIN: CPT

## 2024-04-16 PROCEDURE — 74177 CT ABD & PELVIS W/CONTRAST: CPT

## 2024-04-16 PROCEDURE — 99232 SBSQ HOSP IP/OBS MODERATE 35: CPT | Performed by: STUDENT IN AN ORGANIZED HEALTH CARE EDUCATION/TRAINING PROGRAM

## 2024-04-16 PROCEDURE — 6360000002 HC RX W HCPCS: Performed by: HOSPITALIST

## 2024-04-16 RX ORDER — METOPROLOL SUCCINATE 25 MG/1
25 TABLET, EXTENDED RELEASE ORAL DAILY
Status: DISCONTINUED | OUTPATIENT
Start: 2024-04-17 | End: 2024-04-18 | Stop reason: HOSPADM

## 2024-04-16 RX ADMIN — OXYCODONE AND ACETAMINOPHEN 1 TABLET: 5; 325 TABLET ORAL at 13:14

## 2024-04-16 RX ADMIN — Medication 1 TABLET: at 20:56

## 2024-04-16 RX ADMIN — INSULIN LISPRO 2 UNITS: 100 INJECTION, SOLUTION INTRAVENOUS; SUBCUTANEOUS at 20:54

## 2024-04-16 RX ADMIN — BUSPIRONE HYDROCHLORIDE 30 MG: 10 TABLET ORAL at 08:11

## 2024-04-16 RX ADMIN — Medication 1 TABLET: at 08:11

## 2024-04-16 RX ADMIN — SIMETHICONE 160 MG: 80 TABLET, CHEWABLE ORAL at 10:47

## 2024-04-16 RX ADMIN — DIPHENHYDRAMINE HCL 50 MG: 25 TABLET ORAL at 22:23

## 2024-04-16 RX ADMIN — ATORVASTATIN CALCIUM 10 MG: 10 TABLET, FILM COATED ORAL at 08:11

## 2024-04-16 RX ADMIN — PREGABALIN 200 MG: 75 CAPSULE ORAL at 08:11

## 2024-04-16 RX ADMIN — SIMETHICONE 160 MG: 80 TABLET, CHEWABLE ORAL at 06:26

## 2024-04-16 RX ADMIN — DULOXETINE HYDROCHLORIDE 60 MG: 60 CAPSULE, DELAYED RELEASE ORAL at 20:55

## 2024-04-16 RX ADMIN — ASPIRIN 81 MG CHEWABLE TABLET 81 MG: 81 TABLET CHEWABLE at 08:12

## 2024-04-16 RX ADMIN — OXYCODONE AND ACETAMINOPHEN 1 TABLET: 5; 325 TABLET ORAL at 22:23

## 2024-04-16 RX ADMIN — PREGABALIN 200 MG: 75 CAPSULE ORAL at 20:55

## 2024-04-16 RX ADMIN — BUPROPION HYDROCHLORIDE 150 MG: 150 TABLET, FILM COATED, EXTENDED RELEASE ORAL at 08:11

## 2024-04-16 RX ADMIN — CYANOCOBALAMIN TAB 1000 MCG 5000 MCG: 1000 TAB at 08:11

## 2024-04-16 RX ADMIN — IOPAMIDOL 75 ML: 755 INJECTION, SOLUTION INTRAVENOUS at 10:59

## 2024-04-16 RX ADMIN — IOPAMIDOL 18 ML: 755 INJECTION, SOLUTION INTRAVENOUS at 10:59

## 2024-04-16 RX ADMIN — SIMETHICONE 160 MG: 80 TABLET, CHEWABLE ORAL at 17:30

## 2024-04-16 RX ADMIN — DIPHENHYDRAMINE HCL 50 MG: 25 TABLET ORAL at 13:14

## 2024-04-16 RX ADMIN — CETIRIZINE HYDROCHLORIDE 10 MG: 10 TABLET, FILM COATED ORAL at 08:12

## 2024-04-16 RX ADMIN — CALCIUM 500 MG: 500 TABLET ORAL at 08:11

## 2024-04-16 RX ADMIN — DULOXETINE HYDROCHLORIDE 60 MG: 60 CAPSULE, DELAYED RELEASE ORAL at 08:11

## 2024-04-16 RX ADMIN — SODIUM CHLORIDE, PRESERVATIVE FREE 10 ML: 5 INJECTION INTRAVENOUS at 08:13

## 2024-04-16 RX ADMIN — PIPERACILLIN AND TAZOBACTAM 3375 MG: 3; .375 INJECTION, POWDER, LYOPHILIZED, FOR SOLUTION INTRAVENOUS at 17:31

## 2024-04-16 RX ADMIN — CALCIUM 500 MG: 500 TABLET ORAL at 20:56

## 2024-04-16 RX ADMIN — SODIUM CHLORIDE, PRESERVATIVE FREE 10 ML: 5 INJECTION INTRAVENOUS at 20:56

## 2024-04-16 RX ADMIN — PIPERACILLIN AND TAZOBACTAM 3375 MG: 3; .375 INJECTION, POWDER, LYOPHILIZED, FOR SOLUTION INTRAVENOUS at 10:50

## 2024-04-16 RX ADMIN — BUPROPION HYDROCHLORIDE 150 MG: 150 TABLET, FILM COATED, EXTENDED RELEASE ORAL at 20:56

## 2024-04-16 RX ADMIN — PIPERACILLIN AND TAZOBACTAM 3375 MG: 3; .375 INJECTION, POWDER, LYOPHILIZED, FOR SOLUTION INTRAVENOUS at 02:19

## 2024-04-16 RX ADMIN — BUSPIRONE HYDROCHLORIDE 30 MG: 10 TABLET ORAL at 20:56

## 2024-04-16 ASSESSMENT — PAIN SCALES - GENERAL
PAINLEVEL_OUTOF10: 4
PAINLEVEL_OUTOF10: 2
PAINLEVEL_OUTOF10: 9
PAINLEVEL_OUTOF10: 8
PAINLEVEL_OUTOF10: 7

## 2024-04-16 ASSESSMENT — PAIN - FUNCTIONAL ASSESSMENT
PAIN_FUNCTIONAL_ASSESSMENT: PREVENTS OR INTERFERES SOME ACTIVE ACTIVITIES AND ADLS
PAIN_FUNCTIONAL_ASSESSMENT: PREVENTS OR INTERFERES SOME ACTIVE ACTIVITIES AND ADLS

## 2024-04-16 ASSESSMENT — PAIN DESCRIPTION - LOCATION
LOCATION: BACK

## 2024-04-16 ASSESSMENT — PAIN DESCRIPTION - DESCRIPTORS
DESCRIPTORS: ACHING
DESCRIPTORS: ACHING
DESCRIPTORS: STABBING

## 2024-04-16 ASSESSMENT — PAIN DESCRIPTION - ORIENTATION
ORIENTATION: MID
ORIENTATION: LOWER
ORIENTATION: MID;LOWER

## 2024-04-16 NOTE — PROGRESS NOTES
General Surgery   Daily Progress Note      Patient's Name/Date of Birth: Dimple Pabon / 1970    Date: April 16, 2024     Chief Complaint: Abdominal pain nausea     Patient Active Problem List   Diagnosis    Essential hypertension    Type 2 diabetes mellitus with polyneuropathy ([facility])    Class 3 severe obesity due to excess calories with serious comorbidity and body mass index (BMI) of 45.0 to 49.9 in adult (HCC)    Other insomnia    Anxiety    Recurrent major depressive disorder, in remission ([facility])    Idiopathic peripheral neuropathy    Herniated lumbar intervertebral disc    Lumbar spondylosis    Lumbar disc disorder    Lumbar radiculopathy    Chronic pain syndrome    Spinal stenosis of lumbar region without neurogenic claudication    Primary osteoarthritis of left hip    Factor 5 Leiden mutation, heterozygous ([facility])    Intra-abdominal abscess ([facility])    Acute cystitis without hematuria       Subjective: Minimal pain today. No nausea or emesis. Abdomen soft minimally tender. No BM. +UOP    No fevers   sbp , wbc 8.5 yest.     Objective:  BP (!) 103/54   Pulse 72   Temp 98.3 °F (36.8 °C) (Oral)   Resp 16   Ht 1.524 m (5')   Wt 79 kg (174 lb 2.6 oz)   LMP  (LMP Unknown)   SpO2 96%   BMI 34.01 kg/m²   Labs:  Recent Labs     04/14/24  0825   WBC 8.5   HGB 11.0*   HCT 34.5       Lab Results   Component Value Date    CREATININE 0.6 04/14/2024    BUN 6 04/14/2024     04/14/2024    K 4.2 04/14/2024    CL 99 04/14/2024    CO2 31 (H) 04/14/2024     No results for input(s): \"LIPASE\", \"AMYLASE\" in the last 72 hours.      General appearance:  NAD  Head: NCAT, PERRLA, EOMI, red conjunctiva  Neck: supple, no masses  Lungs: symmetric chest rise, no audible wheezes  Heart: normal rate per peripheral pulse  Abdomen: soft, nondistended, non tender   Skin: no visible lesions  Gu: no cva tenderness  Extremities: extremities normal, atraumatic, no cyanosis or edema      Assessment/Plan:  Dimple Pabon is a 53 y.o.  female with pelvic abscess extending into abdominal wall likely 2/2 perforated small bowel assoc w/ incarcerated incisional hernia    Will require diagnostic lap and evaluation of the bowel with possible resection and repair of defect, timing to be discussed with Dr Jey LOPEZ pain and nausea control   Continue Zosyn   NPO, IVF  Ambulate as able   ICS, pulm hygiene   ASA, SCD's  Monitor wbc, abdominal examination - no skin changes to date   Repeat CTAP w/ PO contrast today to assess perforation- ok to resume diet after scan      Natalie Barry MD    The patient was seen and examined and the chart was reviewed.  Overall, the patient is doing well.  The repeat CT of the abdomen and pelvis showed the fluid collection in the abdomen/abdominal wall.    The patient states that her abdominal pain is improved.  The patient will be seen by infectious disease prior to discharge.  Ultimately, the patient will undergo a diagnostic laparoscopy and evaluation of the area.  The patient may require a small bowel resection if it is associated with the abscess.    Fidel Khanna MD

## 2024-04-16 NOTE — PROGRESS NOTES
transfers  * Therapeutic activities to facilitate/challenge dynamic balance, stand tolerance for increased safety and independence with ADLs  * Positioning to improve skin integrity, interaction with environment and functional independence    Recommended Adaptive Equipment: TBD      Home Living: Lives with house and 7 y/o son, single family home, 2 story, 1 step to enter. Bedroom and bathroom on 1st floor.   Bathroom set-up: Tub/shower         Equipment owned: Wheeled walker, shower chair     Prior Level of Function: Modified Bowman with ADLs , spouse assists with IADLs; ambulated independently but uses rollator PRN.     Driving: Yes    Pain Level: No c/o pain      Cognition: A&O: 4/4; Follows 3 step directions   Memory: good    Sequencing: good    Problem solving: good    Judgement/safety: good      Functional Assessment: AM-PAC Daily Activity Raw Score: 19/24   Initial Eval Status  Date: 4/16/24   Treatment Status  Date:  STGs = LTGs  Time frame: 10-14 days   Feeding Independent         Grooming Stand by Assist   Standing at sink     Modified Bowman    UB Dressing Minimal Assist    Modified Bowman    LB Dressing Minimal Assist     Modified [place]man    Bathing Minimal Assist     Modified [place]man    Toileting Stand by Assist   Seated on commode for perineal hygiene     Modified [place]man    Bed Mobility  Supine to sit: Independent   Sit to supine:  Independent     Supine to sit: Independent   Sit to supine: Independent    Functional Transfers Sit to stand: Supervision   Stand to sit: Supervision     Transfer training with verbal cues for hand placement to improve safety.     Independent    Functional Mobility Stand by Assist without AD to/from bathroom.      Modified Bowman with use of AAD PRN    Balance Sitting:     Static: good     Dynamic: good  Standing: fair plus    Sitting:     Static: good    Dynamic: good  Standing: good    Activity Tolerance fair  ; no c/o  dizziness/lightheadedness   Increase standing tolerance >3-5minutes for improved engagement with functional transfers and indep in ADLs     Visual/  Perceptual WFL        NA    UE ROM/Strength      Right UE:   AROM: WFL   Strength: 4-/5  -good  and wfl FMC/dexterity noted during ADL tasks    Left UE:   AROM : WFL   Strength: 4-/5  -good  and wfl FMC/dexterity noted during ADL tasks    Improve overall B UE strength for participation in functional tasks      Hand Dominance:     Hearing:  WFL   Sensation:   No c/o numbness or tingling  Tone:  WFL   Edema: None      Vitals:  BP at rest: 98/64 BP at EOB: 96/57 BP at end of session: 114/60     Comments: RN cleared patient for OT.   Upon arrival patient in supine.  Therapist facilitated and instructed pt on adapted  techniques & compensatory strategies to improve safety and independence with basic ADLs, bed mobility, functional transfers and mobility to allow pt to achieve highest level of independence and safely.  Pt demonstrated fair plus/good understanding of education & follow through.  At end of session, patient was in supine with call light and phone within reach, all lines and tubes intact.  Overall, patient demonstrated  decreased independence and safety during completion of ADL tasks.  Pt would benefit from continued skilled OT to increase safety and independence with completion of ADL tasks and functional mobility for improved quality of life.       Rehab Potential: Good for established goals.      Patient / Family Goal: Return home       Patient and/or family were instructed on functional diagnosis, prognosis/goals and OT plan of care. Demonstrated good understanding.     Eval Complexity: Low    Time In: 8:35 AM   Time Out: 8:55 AM    Total Treatment Time: 0       Min Units   OT Eval Low 97165  X  1    OT Eval Medium 98509      OT Eval High 56218      OT Re-Eval 22127            ADL/Self Care 85450     Therapeutic Activities 56214       Therapeutic Ex

## 2024-04-16 NOTE — PROGRESS NOTES
[facility] Hospitalist Progress Note    Admitting Date and Time: 4/12/2024 10:33 PM  Admit Dx: Generalized abdominal pain [R10.84]  Intra-abdominal abscess (HCC) [K65.1]    Subjective:  Patient is being followed for Generalized abdominal pain [R10.84]  Intra-abdominal abscess (HCC) [K65.1]   Pt feels okay  Per RN: no additional concerns    ROS: denies fever, chills, cp, sob, n/v, HA unless stated above.      buPROPion  150 mg Oral BID    pregabalin  200 mg Oral BID    DULoxetine  60 mg Oral BID    aspirin  81 mg Oral Daily    atorvastatin  10 mg Oral Daily    busPIRone  30 mg Oral BID    cetirizine  10 mg Oral Daily    metoprolol succinate  50 mg Oral Daily    sodium chloride flush  5-40 mL IntraVENous 2 times per day    piperacillin-tazobactam  3,375 mg IntraVENous Q8H    insulin lispro  0-8 Units SubCUTAneous 4x Daily WC    calcium elemental  1 tablet Oral BID    vitamin B-12  5,000 mcg Oral Daily    therapeutic multivitamin-minerals  1 tablet Oral BID    simethicone  160 mg Oral TID AC     albuterol, 2.5 mg, Q4H PRN  oxyCODONE-acetaminophen, 1 tablet, BID PRN  sodium chloride flush, 5-40 mL, PRN  sodium chloride, , PRN  ondansetron, 4 mg, Q8H PRN   Or  ondansetron, 4 mg, Q6H PRN  acetaminophen, 650 mg, Q6H PRN   Or  acetaminophen, 650 mg, Q6H PRN  polyethylene glycol, 17 g, Daily PRN  dextrose bolus, 125 mL, PRN   Or  dextrose bolus, 250 mL, PRN  glucagon (rDNA), 1 mg, PRN  dextrose, , Continuous PRN  Glucose, 15 g, PRN  diphenhydrAMINE, 50 mg, BID PRN         Objective:  BP (!) 99/56   Pulse 59   Temp 97 °F (36.1 °C) (Temporal)   Resp 18   Ht 1.524 m (5')   Wt 79 kg (174 lb 2.6 oz)   LMP  (LMP Unknown)   SpO2 94%   BMI 34.01 kg/m²     General Appearance: alert and oriented to person, place and time and in NAD, sitting on side of bed  Skin: warm and dry  Head: normocephalic and atraumatic  Eyes: PERRL, EOMI, conjunctivae normal  Neck: neck supple, trachea midline   Pulmonary/Chest: CTAB, no  laparoscopy  Pyuria- UA w/ LE, cont abx as above for now, unsure if true infection, no urinary sxs reported, follow UCx  DM2- hold oral meds. SSI and adjust as indicated, ACHS checks, hypoglycemia protocol   HTN- cont home meds, adjust as indicated   Chronic pain- cont lyrica, cymbalta  Depression- cont cymbalta, wellbutrin, buspar    Today's exam/findings/plan  -for CT a/p today to assess hernia/abscess, will need eventual diagnostic lap and awaiting Gen Surg input on timing, possibly as outpt?, if no further inpt w/u planned may discharge later today, remains HDS    NOTE: Portions of this report may have been transcribed using voice recognition software. Every effort was made to ensure accuracy; however, inadvertent computerized transcription errors may be present.  Electronically signed by Rob Richard MD on 4/16/2024 at 3:15 PM

## 2024-04-16 NOTE — CARE COORDINATION
NAME:  Kayla Curry   :   3/16/1932   MRN:   [id number]     [facility]  Date/Time:  2021 12:29 PM    Procedure Type:   ERCP with biliary sphincterotomy, biliary stone removal, biliary stent placement, duodenal balloon dilation, biopsy    Indications: common bile duct stone, Abnormal UGI showing pyloric stenosis  Pre-operative Diagnosis: see indication above  Post-operative Diagnosis:  See findings below  : Blessing Akhtar MD  Referring Provider:     Christoph Abdi MD    Exam:  Airway: clear, no airway problems anticipated  Heart: RRR, without gallops or rubs  Lungs: clear bilaterally without wheezes, crackles, or rhonchi  Abdomen: soft, nontender, nondistended, bowel sounds present  Mental Status: awake, alert and oriented to person, place and time    Sedation:  General anesthesia   Medication: Levofloxacin 500 mg IV, Indomethacin 100 mg LA  Procedure Details:  After informed consent was obtained with all risks and benefits of procedure explained, the patient was taken to the fluoroscopy suite and placed in the prone position. Upon sequential sedation as per above, the Olympus duodenoscope [id number]   was inserted via the mouthpeice and carefully advanced to the second portion of the duodenum. The quality of visualization was good. The duodenoscope was withdrawn into a short position. Findings:   Endoscopic:     Given patient's presenting complaint's of nausea and vomiting and UGI showing distorted antrum/pylorus I started with standard EGD scope:    1. Normal proximal, mid, and distal esophagus  2. Moderate, diffuse, atrophic gastropathy in fundus and body. Biopsied  3. Distorted distal antrum/pylorus (?prior PUD). Given abnormal UGI and patients symptoms of n/v this was dilated to 15 mm with a Pyloric dilating balloon  4. Stomach otherwise normal, including retroflexion  5.  Normal duodenal bulb and 2nd/3rd portion of the duodenum    Ampulla: Normal    On first touch, Updated plan of care. Patient is from home with  independently.  Discharge plan is home with no needs.  will provide transport at discharge. Patient admitted with abdominal pain. Repeat CTAP with contrast. General surgery to decide plan of care after scan.   Electronically signed by Michelle Marie RN on 4/16/2024 at 9:16 AM     wire guided cannulation was achieved with a 0.025 Jagwire through the [place]    Cholangiogram:   1. Numerous stones (too many to count) throughout the entire biliary tree with resultant severe biliary dilation    Specimen Removed: 1. Gastric    Complications: None. EBL:  None. Interventions:    Biliary:   1. Large sphincterotomy performed  2. Balloon sweeps performed with a 15 mm balloon with removal of multiple stones. Multiple sweeps continued to remove debris/sludge. I could easily pull a fully inflated 15 mm balloon through the ampulla  3. Given severe stone burden, a 10 mm by 6 cm FC-SEMS was deployed in ideal location    The pancreatic duct was not cannulated or opacified    Impression:    Given patient's presenting complaint's of nausea and vomiting and UGI showing distorted antrum/pylorus I started with standard EGD scope:    1. Normal proximal, mid, and distal esophagus  2. Moderate, diffuse, atrophic gastropathy in fundus and body. Biopsied  3. Distorted distal antrum/pylorus (?prior PUD). Given abnormal UGI and patients symptoms of n/v this was dilated to 15 mm with a Pyloric dilating balloon  4. Stomach otherwise normal, including retroflexion  5. Normal duodenal bulb and 2nd/3rd portion of the duodenum    ERCP:  1. Numerous stones (too many to count) throughout the entire biliary tree with resultant severe biliary dilation  2. Large sphincterotomy performed  3. Balloon sweeps performed with a 15 mm balloon with removal of multiple stones. Multiple sweeps continued to remove debris/sludge. I could easily pull a fully inflated 15 mm balloon through the ampulla  4. Given severe stone burden, a 10 mm by 6 cm FC-SEMS was deployed in ideal location    Recommendations:   1. Can have Clear liquid diet at 4 pm if no significant AP  2. Dr. Rosanna Crespo discussing possible cholecystectomy with family  3. Follow up pathology  4.  Repeat ERCP in 2-3 months with stent removal and confirmation of cleared CBD (though would only do this after cholecystectomy if that is performed)  5.  No systemic anticoagulation/anti-platelet agents (I.e. Coumadin) x 10 days      Discharge Disposition:  Back to floor following recovery in PACU    Pedrito Vásquez MD

## 2024-04-16 NOTE — PLAN OF CARE
Problem: Pain  Goal: Verbalizes/displays adequate comfort level or baseline comfort level  Outcome: Progressing     Problem: Respiratory - Adult  Goal: Achieves optimal ventilation and oxygenation  Outcome: Progressing     Problem: Skin/Tissue Integrity - Adult  Goal: Skin integrity remains intact  Outcome: Progressing     Problem: Musculoskeletal - Adult  Goal: Return mobility to safest level of function  Outcome: Progressing     Problem: Gastrointestinal - Adult  Goal: Minimal or absence of nausea and vomiting  Outcome: Progressing  Goal: Maintains or returns to baseline bowel function  Outcome: Progressing  Goal: Maintains adequate nutritional intake  Outcome: Progressing     Problem: Genitourinary - Adult  Goal: Absence of urinary retention  Outcome: Progressing     Problem: Metabolic/Fluid and Electrolytes - Adult  Goal: Electrolytes maintained within normal limits  Outcome: Progressing  Goal: Hemodynamic stability and optimal renal function maintained  Outcome: Progressing  Goal: Glucose maintained within prescribed range  Outcome: Progressing     Problem: Hematologic - Adult  Goal: Maintains hematologic stability  Outcome: Progressing     Problem: Safety - Adult  Goal: Free from fall injury  Outcome: Progressing     Problem: Chronic Conditions and Co-morbidities  Goal: Patient's chronic conditions and co-morbidity symptoms are monitored and maintained or improved  Outcome: Progressing

## 2024-04-16 NOTE — PATIENT CARE CONFERENCE
P Quality Flow/Interdisciplinary Rounds Progress Note        Quality Flow Rounds held on April 16, 2024    Disciplines Attending:  Bedside Nurse, , , and Nursing Unit Leadership    Dimple Pabon was admitted on 4/12/2024 10:33 PM    Anticipated Discharge Date:       Disposition:    Tony Score:  Tony Scale Score: 21    Readmission Risk              Risk of Unplanned Readmission:  11           Discussed patient goal for the day, patient clinical progression, and barriers to discharge.  The following Goal(s) of the Day/Commitment(s) have been identified:  Diagnostics - Report Results Ct chest      Rachel Scott RN  April 16, 2024

## 2024-04-17 LAB
ANION GAP SERPL CALCULATED.3IONS-SCNC: 10 MMOL/L (ref 7–16)
BASOPHILS # BLD: 0.03 K/UL (ref 0–0.2)
BASOPHILS NFR BLD: 0 % (ref 0–2)
BUN SERPL-MCNC: 7 MG/DL (ref 6–20)
CALCIUM SERPL-MCNC: 8.7 MG/DL (ref 8.6–10.2)
CHLORIDE SERPL-SCNC: 102 MMOL/L (ref 98–107)
CO2 SERPL-SCNC: 26 MMOL/L (ref 22–29)
CREAT SERPL-MCNC: 0.5 MG/DL (ref 0.5–1)
EOSINOPHIL # BLD: 0.28 K/UL (ref 0.05–0.5)
EOSINOPHILS RELATIVE PERCENT: 4 % (ref 0–6)
ERYTHROCYTE [DISTWIDTH] IN BLOOD BY AUTOMATED COUNT: 12.5 % (ref 11.5–15)
GFR SERPL CREATININE-BSD FRML MDRD: >90 ML/MIN/1.73M2
GLUCOSE BLD-MCNC: 110 MG/DL (ref 74–99)
GLUCOSE BLD-MCNC: 117 MG/DL (ref 74–99)
GLUCOSE BLD-MCNC: 71 MG/DL (ref 74–99)
GLUCOSE BLD-MCNC: 81 MG/DL (ref 74–99)
GLUCOSE SERPL-MCNC: 103 MG/DL (ref 74–99)
HCT VFR BLD AUTO: 34.4 % (ref 34–48)
HGB BLD-MCNC: 11 G/DL (ref 11.5–15.5)
IMM GRANULOCYTES # BLD AUTO: 0.03 K/UL (ref 0–0.58)
IMM GRANULOCYTES NFR BLD: 0 % (ref 0–5)
LYMPHOCYTES NFR BLD: 2.81 K/UL (ref 1.5–4)
LYMPHOCYTES RELATIVE PERCENT: 36 % (ref 20–42)
MCH RBC QN AUTO: 26.9 PG (ref 26–35)
MCHC RBC AUTO-ENTMCNC: 32 G/DL (ref 32–34.5)
MCV RBC AUTO: 84.1 FL (ref 80–99.9)
MONOCYTES NFR BLD: 0.59 K/UL (ref 0.1–0.95)
MONOCYTES NFR BLD: 8 % (ref 2–12)
NEUTROPHILS NFR BLD: 52 % (ref 43–80)
NEUTS SEG NFR BLD: 4.14 K/UL (ref 1.8–7.3)
PLATELET # BLD AUTO: 328 K/UL (ref 130–450)
PMV BLD AUTO: 8.9 FL (ref 7–12)
POTASSIUM SERPL-SCNC: 4.3 MMOL/L (ref 3.5–5)
RBC # BLD AUTO: 4.09 M/UL (ref 3.5–5.5)
SODIUM SERPL-SCNC: 138 MMOL/L (ref 132–146)
WBC OTHER # BLD: 7.9 K/UL (ref 4.5–11.5)

## 2024-04-17 PROCEDURE — 6360000002 HC RX W HCPCS: Performed by: STUDENT IN AN ORGANIZED HEALTH CARE EDUCATION/TRAINING PROGRAM

## 2024-04-17 PROCEDURE — 85025 COMPLETE CBC W/AUTO DIFF WBC: CPT

## 2024-04-17 PROCEDURE — 80048 BASIC METABOLIC PNL TOTAL CA: CPT

## 2024-04-17 PROCEDURE — 6370000000 HC RX 637 (ALT 250 FOR IP): Performed by: STUDENT IN AN ORGANIZED HEALTH CARE EDUCATION/TRAINING PROGRAM

## 2024-04-17 PROCEDURE — 1200000000 HC SEMI PRIVATE

## 2024-04-17 PROCEDURE — 6370000000 HC RX 637 (ALT 250 FOR IP): Performed by: HOSPITALIST

## 2024-04-17 PROCEDURE — 2580000003 HC RX 258: Performed by: HOSPITALIST

## 2024-04-17 PROCEDURE — 2580000003 HC RX 258: Performed by: STUDENT IN AN ORGANIZED HEALTH CARE EDUCATION/TRAINING PROGRAM

## 2024-04-17 PROCEDURE — 6360000002 HC RX W HCPCS: Performed by: HOSPITALIST

## 2024-04-17 PROCEDURE — 99232 SBSQ HOSP IP/OBS MODERATE 35: CPT | Performed by: STUDENT IN AN ORGANIZED HEALTH CARE EDUCATION/TRAINING PROGRAM

## 2024-04-17 PROCEDURE — 82962 GLUCOSE BLOOD TEST: CPT

## 2024-04-17 PROCEDURE — 36415 COLL VENOUS BLD VENIPUNCTURE: CPT

## 2024-04-17 RX ORDER — SODIUM CHLORIDE 0.9 % (FLUSH) 0.9 %
5-40 SYRINGE (ML) INJECTION EVERY 12 HOURS SCHEDULED
Status: DISCONTINUED | OUTPATIENT
Start: 2024-04-17 | End: 2024-04-18 | Stop reason: HOSPADM

## 2024-04-17 RX ORDER — SODIUM CHLORIDE 0.9 % (FLUSH) 0.9 %
5-40 SYRINGE (ML) INJECTION PRN
Status: DISCONTINUED | OUTPATIENT
Start: 2024-04-17 | End: 2024-04-18 | Stop reason: HOSPADM

## 2024-04-17 RX ORDER — HEPARIN 100 UNIT/ML
3 SYRINGE INTRAVENOUS EVERY 12 HOURS SCHEDULED
Status: DISCONTINUED | OUTPATIENT
Start: 2024-04-17 | End: 2024-04-18 | Stop reason: HOSPADM

## 2024-04-17 RX ORDER — SODIUM CHLORIDE 9 MG/ML
INJECTION, SOLUTION INTRAVENOUS PRN
Status: DISCONTINUED | OUTPATIENT
Start: 2024-04-17 | End: 2024-04-18 | Stop reason: HOSPADM

## 2024-04-17 RX ORDER — HEPARIN 100 UNIT/ML
3 SYRINGE INTRAVENOUS PRN
Status: DISCONTINUED | OUTPATIENT
Start: 2024-04-17 | End: 2024-04-18 | Stop reason: HOSPADM

## 2024-04-17 RX ORDER — LIDOCAINE HYDROCHLORIDE 10 MG/ML
5 INJECTION, SOLUTION EPIDURAL; INFILTRATION; INTRACAUDAL; PERINEURAL ONCE
Status: COMPLETED | OUTPATIENT
Start: 2024-04-17 | End: 2024-04-18

## 2024-04-17 RX ADMIN — PIPERACILLIN AND TAZOBACTAM 3375 MG: 3; .375 INJECTION, POWDER, LYOPHILIZED, FOR SOLUTION INTRAVENOUS at 02:17

## 2024-04-17 RX ADMIN — DULOXETINE HYDROCHLORIDE 60 MG: 60 CAPSULE, DELAYED RELEASE ORAL at 08:25

## 2024-04-17 RX ADMIN — PIPERACILLIN AND TAZOBACTAM 3375 MG: 3; .375 INJECTION, POWDER, LYOPHILIZED, FOR SOLUTION INTRAVENOUS at 10:23

## 2024-04-17 RX ADMIN — OXYCODONE AND ACETAMINOPHEN 1 TABLET: 5; 325 TABLET ORAL at 21:51

## 2024-04-17 RX ADMIN — CALCIUM 500 MG: 500 TABLET ORAL at 20:17

## 2024-04-17 RX ADMIN — SODIUM CHLORIDE, PRESERVATIVE FREE 10 ML: 5 INJECTION INTRAVENOUS at 08:26

## 2024-04-17 RX ADMIN — ATORVASTATIN CALCIUM 10 MG: 10 TABLET, FILM COATED ORAL at 08:25

## 2024-04-17 RX ADMIN — SIMETHICONE 160 MG: 80 TABLET, CHEWABLE ORAL at 15:43

## 2024-04-17 RX ADMIN — BUSPIRONE HYDROCHLORIDE 30 MG: 10 TABLET ORAL at 08:25

## 2024-04-17 RX ADMIN — BUSPIRONE HYDROCHLORIDE 30 MG: 10 TABLET ORAL at 20:17

## 2024-04-17 RX ADMIN — BUPROPION HYDROCHLORIDE 150 MG: 150 TABLET, FILM COATED, EXTENDED RELEASE ORAL at 20:17

## 2024-04-17 RX ADMIN — CYANOCOBALAMIN TAB 1000 MCG 5000 MCG: 1000 TAB at 08:25

## 2024-04-17 RX ADMIN — DIPHENHYDRAMINE HCL 50 MG: 25 TABLET ORAL at 21:51

## 2024-04-17 RX ADMIN — SIMETHICONE 160 MG: 80 TABLET, CHEWABLE ORAL at 10:31

## 2024-04-17 RX ADMIN — CETIRIZINE HYDROCHLORIDE 10 MG: 10 TABLET, FILM COATED ORAL at 08:26

## 2024-04-17 RX ADMIN — PREGABALIN 200 MG: 75 CAPSULE ORAL at 20:17

## 2024-04-17 RX ADMIN — SIMETHICONE 160 MG: 80 TABLET, CHEWABLE ORAL at 06:01

## 2024-04-17 RX ADMIN — DULOXETINE HYDROCHLORIDE 60 MG: 60 CAPSULE, DELAYED RELEASE ORAL at 20:17

## 2024-04-17 RX ADMIN — ASPIRIN 81 MG CHEWABLE TABLET 81 MG: 81 TABLET CHEWABLE at 08:25

## 2024-04-17 RX ADMIN — OXYCODONE AND ACETAMINOPHEN 1 TABLET: 5; 325 TABLET ORAL at 10:31

## 2024-04-17 RX ADMIN — BUPROPION HYDROCHLORIDE 150 MG: 150 TABLET, FILM COATED, EXTENDED RELEASE ORAL at 08:26

## 2024-04-17 RX ADMIN — CALCIUM 500 MG: 500 TABLET ORAL at 08:25

## 2024-04-17 RX ADMIN — PREGABALIN 200 MG: 75 CAPSULE ORAL at 08:30

## 2024-04-17 RX ADMIN — Medication 1 TABLET: at 08:26

## 2024-04-17 RX ADMIN — METOPROLOL SUCCINATE 25 MG: 25 TABLET, FILM COATED, EXTENDED RELEASE ORAL at 08:26

## 2024-04-17 RX ADMIN — Medication 1 TABLET: at 20:17

## 2024-04-17 RX ADMIN — DIPHENHYDRAMINE HCL 50 MG: 25 TABLET ORAL at 10:32

## 2024-04-17 RX ADMIN — PIPERACILLIN AND TAZOBACTAM 3375 MG: 3; .375 INJECTION, POWDER, LYOPHILIZED, FOR SOLUTION INTRAVENOUS at 17:36

## 2024-04-17 ASSESSMENT — PAIN DESCRIPTION - DESCRIPTORS
DESCRIPTORS: ACHING;SORE
DESCRIPTORS: ACHING

## 2024-04-17 ASSESSMENT — PAIN - FUNCTIONAL ASSESSMENT: PAIN_FUNCTIONAL_ASSESSMENT: ACTIVITIES ARE NOT PREVENTED

## 2024-04-17 ASSESSMENT — PAIN SCALES - GENERAL
PAINLEVEL_OUTOF10: 8
PAINLEVEL_OUTOF10: 7

## 2024-04-17 ASSESSMENT — PAIN DESCRIPTION - LOCATION
LOCATION: ABDOMEN
LOCATION: BACK

## 2024-04-17 ASSESSMENT — PAIN DESCRIPTION - ORIENTATION
ORIENTATION: LOWER
ORIENTATION: LEFT

## 2024-04-17 NOTE — PROGRESS NOTES
General Surgery   Daily Progress Note      Patient's Name/Date of Birth: Dimple Pabon / 1970    Date: April 17, 2024     Chief Complaint: Abdominal pain nausea     Patient Active Problem List   Diagnosis    Essential hypertension    Type 2 diabetes mellitus with polyneuropathy ([facility])    Class 3 severe obesity due to excess calories with serious comorbidity and body mass index (BMI) of 45.0 to 49.9 in adult (HCC)    Other insomnia    Anxiety    Recurrent major depressive disorder, in remission (HCC)    Idiopathic peripheral neuropathy    Herniated lumbar intervertebral disc    Lumbar spondylosis    Lumbar disc disorder    Lumbar radiculopathy    Chronic pain syndrome    Spinal stenosis of lumbar region without neurogenic claudication    Primary osteoarthritis of left hip    Factor 5 Leiden mutation, heterozygous (HCC)    Intra-abdominal abscess ([facility])    Acute cystitis without hematuria       Subjective: Feels the same this AM. Pain controlled. She is passing gas and having BM. She is tolerating her diet.      Objective:  BP (!) 110/59   Pulse 75   Temp 97.8 °F (36.6 °C) (Oral)   Resp 16   Ht 1.524 m (5')   Wt 79 kg (174 lb 2.6 oz)   LMP  (LMP Unknown)   SpO2 95%   BMI 34.01 kg/m²   Labs:  Recent Labs     04/14/24  0825   WBC 8.5   HGB 11.0*   HCT 34.5       Lab Results   Component Value Date    CREATININE 0.6 04/14/2024    BUN 6 04/14/2024     04/14/2024    K 4.2 04/14/2024    CL 99 04/14/2024    CO2 31 (H) 04/14/2024     No results for input(s): \"LIPASE\", \"AMYLASE\" in the last 72 hours.      General appearance:  NAD  Head: NCAT, PERRLA, EOMI, red conjunctiva  Neck: supple, no masses  Lungs: symmetric chest rise, no audible wheezes  Heart: normal rate per peripheral pulse  Abdomen: soft, nondistended, non tender   Skin: no visible lesions  Gu: no cva tenderness  Extremities: extremities normal, atraumatic, no cyanosis or edema      Assessment/Plan:  Dimple Pabon is a 53 y.o. female with pelvic

## 2024-04-17 NOTE — PATIENT CARE CONFERENCE
P Quality Flow/Interdisciplinary Rounds Progress Note        Quality Flow Rounds held on April 17, 2024    Disciplines Attending:  Bedside Nurse, , , and Nursing Unit Leadership    Dimple Pabon was admitted on 4/12/2024 10:33 PM    Anticipated Discharge Date:       Disposition:    Tony Score:  Tony Scale Score: 21    Readmission Risk              Risk of Unplanned Readmission:  10           Discussed patient goal for the day, patient clinical progression, and barriers to discharge.  The following Goal(s) of the Day/Commitment(s) have been identified:  Labs - Report Results      Luz Maria De La Rosa RN  April 17, 2024

## 2024-04-17 NOTE — CONSULTS
Transportation Needs: No Transportation Needs (4/13/2024)    PRAPARE - Transportation     Lack of Transportation (Medical): No     Lack of Transportation (Non-Medical): No   Physical Activity: Inactive (9/20/2022)    Exercise Vital Sign     Days of Exercise per Week: 0 days     Minutes of Exercise per Session: 0 min   Housing Stability: Low Risk  (4/13/2024)    Housing Stability Vital Sign     Unable to Pay for Housing in the Last Year: No     Number of Places Lived in the Last Year: 1     Unstable Housing in the Last Year: No     Family History:       Problem Relation Age of Onset    Cancer Other     Depression Other     Diabetes Other     Heart Disease Other     Mental Illness Other     Stroke Other    . Otherwise non-pertinent to the chief complaint.    REVIEW OF SYSTEMS:    As mentioned in HPI, all other systems negative.       PHYSICAL EXAM:    Vitals:    BP (!) 110/59   Pulse 75   Temp 97.8 °F (36.6 °C) (Oral)   Resp 16   Ht 1.524 m (5')   Wt 79 kg (174 lb 2.6 oz)   LMP  (LMP Unknown)   SpO2 95%   BMI 34.01 kg/m²   Constitutional: The patient is awake, alert, and oriented.   Skin: Warm and dry. No rashes were noted. No jaundice.  HEENT: Eyes show round, and reactive pupils. Moist mucous membranes, no ulcerations, no thrush.   Neck: Supple to movements. No lymphadenopathy.   Chest: No use of accessory muscles to breathe. Symmetrical expansion. Auscultation reveals no wheezing, crackles, or rhonchi.   Cardiovascular: S1 and S2 are rhythmic and regular. No murmurs appreciated.   Abdomen: soft, tenderness periumbilical region.  Extremities: No clubbing, no cyanosis, no edema.  Musculoskeletal: no gross focal abnormalities  Neurological: awake alert oriented x 3  Lines: peripheral      CBC+dif:  Recent Labs     04/17/24  1056   WBC 7.9   HGB 11.0*   HCT 34.4   MCV 84.1      NEUTROABS 4.14     Lab Results   Component Value Date    CRP 8.8 (H) 02/17/2023     No results found for: \"CRPHS\"  Lab Results    Final Result   4.6 cm thick-walled air and fluid collection which is present at the left   periumbilical anterior abdominal deep subcutaneous tissues with moderate   intraperitoneal extension, appearance concerning for a small subcutaneous   periumbilical abscess formation with intraperitoneal extension.           Assessment:  Abdominal wall abscess / small bowel perforation: ? If she is forming a fistula.     Plan:    Continue IV Zosyn  Ordered PICC line.  Monitor labs  Will follow with you    Thank you for having us see this patient in consultation. I will be discussing this case with the treating physicians.      Electronically signed by MALICK DAMON MD on 4/17/2024 at 1:43 PM

## 2024-04-17 NOTE — PROGRESS NOTES
[facility] Hospitalist Progress Note    Admitting Date and Time: 4/12/2024 10:33 PM  Admit Dx: Generalized abdominal pain [R10.84]  Intra-abdominal abscess (HCC) [K65.1]    Subjective:  Patient is being followed for Generalized abdominal pain [R10.84]  Intra-abdominal abscess (HCC) [K65.1]   Pt feels okay  Per RN: no additional concerns    ROS: denies fever, chills, cp, sob, n/v, HA unless stated above.      metoprolol succinate  25 mg Oral Daily    buPROPion  150 mg Oral BID    pregabalin  200 mg Oral BID    DULoxetine  60 mg Oral BID    aspirin  81 mg Oral Daily    atorvastatin  10 mg Oral Daily    busPIRone  30 mg Oral BID    cetirizine  10 mg Oral Daily    sodium chloride flush  5-40 mL IntraVENous 2 times per day    piperacillin-tazobactam  3,375 mg IntraVENous Q8H    insulin lispro  0-8 Units SubCUTAneous 4x Daily WC    calcium elemental  1 tablet Oral BID    vitamin B-12  5,000 mcg Oral Daily    therapeutic multivitamin-minerals  1 tablet Oral BID    simethicone  160 mg Oral TID AC     albuterol, 2.5 mg, Q4H PRN  oxyCODONE-acetaminophen, 1 tablet, BID PRN  sodium chloride flush, 5-40 mL, PRN  sodium chloride, , PRN  ondansetron, 4 mg, Q8H PRN   Or  ondansetron, 4 mg, Q6H PRN  acetaminophen, 650 mg, Q6H PRN   Or  acetaminophen, 650 mg, Q6H PRN  polyethylene glycol, 17 g, Daily PRN  dextrose bolus, 125 mL, PRN   Or  dextrose bolus, 250 mL, PRN  glucagon (rDNA), 1 mg, PRN  dextrose, , Continuous PRN  Glucose, 15 g, PRN  diphenhydrAMINE, 50 mg, BID PRN         Objective:  BP (!) 110/59   Pulse 75   Temp 97.8 °F (36.6 °C) (Oral)   Resp 16   Ht 1.524 m (5')   Wt 79 kg (174 lb 2.6 oz)   LMP  (LMP Unknown)   SpO2 95%   BMI 34.01 kg/m²     General Appearance: alert and oriented to person, place and time and in NAD, sitting on side of bed  Skin: warm and dry  Head: normocephalic and atraumatic  Eyes: PERRL, EOMI, conjunctivae normal  Neck: neck supple, trachea midline   Pulmonary/Chest: CTAB, no w/r/r,  UA w/ LE, cont abx as above for now, unsure if true infection, no urinary sxs reported, follow UCx  DM2- hold oral meds. SSI and adjust as indicated, ACHS checks, hypoglycemia protocol   HTN- cont home meds, adjust as indicated   Chronic pain- cont lyrica, cymbalta  Depression- cont cymbalta, wellbutrin, buspar    Today's exam/findings/plan  -note no plans for inpt laparoscopy at this time but to f/u as outpt for timing, Gen Surg suspect perforated hernia and requesting ID c/s for abx direction at time of dispo  -pt very anxious to go home, has remained HDS and tolerating PO diet with pain fairly well controlled, possibly dispo later today pending above    Addendum:  3:04 PM notified pt will need PICC and to go home on IV zosyn, will likely need 1-2d to establish appropriate home nursing to administer meds, hopeful for dispo tomorrow with plans for outpt f/u with Gen Surg regarding timing of diagnostic lap    NOTE: Portions of this report may have been transcribed using voice recognition software. Every effort was made to ensure accuracy; however, inadvertent computerized transcription errors may be present.  Electronically signed by Rob Richard MD on 4/17/2024 at 2:18 PM

## 2024-04-17 NOTE — CARE COORDINATION
Updated plan of care. Patient is from home with  independently.  Discharge plan is home with no needs.  will provide transport at discharge. Patient admitted with abdominal pain. ID consulted for recommendations of intra abdominal abscess.   Electronically signed by Michelle Marie RN on 4/17/2024 at 9:24 AM    UPDATE; Patient will need IV antibiotics at home. Patient does not have a preference of [facility] agency. [facility] called and cannot start until Monday but is following. Shima with [facility] called and a referral was made. [place] with [facility] called and a referral was made. Will need signed script faxed to [facility] once received.   Electronically signed by Michelle Marie RN on 4/17/2024 at 3:12 PM

## 2024-04-18 ENCOUNTER — TELEPHONE (OUTPATIENT)
Dept: PRIMARY CARE CLINIC | Age: 54
End: 2024-04-18

## 2024-04-18 VITALS
SYSTOLIC BLOOD PRESSURE: 113 MMHG | DIASTOLIC BLOOD PRESSURE: 61 MMHG | HEART RATE: 72 BPM | TEMPERATURE: 97.7 F | HEIGHT: 60 IN | OXYGEN SATURATION: 93 % | RESPIRATION RATE: 16 BRPM | BODY MASS INDEX: 34.19 KG/M2 | WEIGHT: 174.16 LBS

## 2024-04-18 LAB
ANION GAP SERPL CALCULATED.3IONS-SCNC: 7 MMOL/L (ref 7–16)
BASOPHILS # BLD: 0.04 K/UL (ref 0–0.2)
BASOPHILS NFR BLD: 1 % (ref 0–2)
BUN SERPL-MCNC: 5 MG/DL (ref 6–20)
CALCIUM SERPL-MCNC: 8.7 MG/DL (ref 8.6–10.2)
CHLORIDE SERPL-SCNC: 104 MMOL/L (ref 98–107)
CO2 SERPL-SCNC: 32 MMOL/L (ref 22–29)
CREAT SERPL-MCNC: 0.5 MG/DL (ref 0.5–1)
EOSINOPHIL # BLD: 0.32 K/UL (ref 0.05–0.5)
EOSINOPHILS RELATIVE PERCENT: 4 % (ref 0–6)
ERYTHROCYTE [DISTWIDTH] IN BLOOD BY AUTOMATED COUNT: 12.6 % (ref 11.5–15)
GFR SERPL CREATININE-BSD FRML MDRD: >90 ML/MIN/1.73M2
GLUCOSE BLD-MCNC: 120 MG/DL (ref 74–99)
GLUCOSE BLD-MCNC: 158 MG/DL (ref 74–99)
GLUCOSE BLD-MCNC: 67 MG/DL (ref 74–99)
GLUCOSE BLD-MCNC: 83 MG/DL (ref 74–99)
GLUCOSE SERPL-MCNC: 92 MG/DL (ref 74–99)
HCT VFR BLD AUTO: 36 % (ref 34–48)
HGB BLD-MCNC: 11.2 G/DL (ref 11.5–15.5)
IMM GRANULOCYTES # BLD AUTO: 0.06 K/UL (ref 0–0.58)
IMM GRANULOCYTES NFR BLD: 1 % (ref 0–5)
LYMPHOCYTES NFR BLD: 3.4 K/UL (ref 1.5–4)
LYMPHOCYTES RELATIVE PERCENT: 43 % (ref 20–42)
MCH RBC QN AUTO: 27.1 PG (ref 26–35)
MCHC RBC AUTO-ENTMCNC: 31.1 G/DL (ref 32–34.5)
MCV RBC AUTO: 87.2 FL (ref 80–99.9)
MONOCYTES NFR BLD: 0.59 K/UL (ref 0.1–0.95)
MONOCYTES NFR BLD: 7 % (ref 2–12)
NEUTROPHILS NFR BLD: 45 % (ref 43–80)
NEUTS SEG NFR BLD: 3.56 K/UL (ref 1.8–7.3)
PLATELET # BLD AUTO: 294 K/UL (ref 130–450)
PMV BLD AUTO: 8.9 FL (ref 7–12)
POTASSIUM SERPL-SCNC: 4.3 MMOL/L (ref 3.5–5)
RBC # BLD AUTO: 4.13 M/UL (ref 3.5–5.5)
SODIUM SERPL-SCNC: 143 MMOL/L (ref 132–146)
WBC OTHER # BLD: 8 K/UL (ref 4.5–11.5)

## 2024-04-18 PROCEDURE — 76937 US GUIDE VASCULAR ACCESS: CPT

## 2024-04-18 PROCEDURE — C1751 CATH, INF, PER/CENT/MIDLINE: HCPCS

## 2024-04-18 PROCEDURE — 2580000003 HC RX 258: Performed by: STUDENT IN AN ORGANIZED HEALTH CARE EDUCATION/TRAINING PROGRAM

## 2024-04-18 PROCEDURE — 97535 SELF CARE MNGMENT TRAINING: CPT

## 2024-04-18 PROCEDURE — 2580000003 HC RX 258: Performed by: HOSPITALIST

## 2024-04-18 PROCEDURE — 2500000003 HC RX 250 WO HCPCS: Performed by: STUDENT IN AN ORGANIZED HEALTH CARE EDUCATION/TRAINING PROGRAM

## 2024-04-18 PROCEDURE — 97530 THERAPEUTIC ACTIVITIES: CPT

## 2024-04-18 PROCEDURE — 82962 GLUCOSE BLOOD TEST: CPT

## 2024-04-18 PROCEDURE — 36569 INSJ PICC 5 YR+ W/O IMAGING: CPT

## 2024-04-18 PROCEDURE — 6370000000 HC RX 637 (ALT 250 FOR IP): Performed by: HOSPITALIST

## 2024-04-18 PROCEDURE — B548ZZA ULTRASONOGRAPHY OF SUPERIOR VENA CAVA, GUIDANCE: ICD-10-PCS | Performed by: STUDENT IN AN ORGANIZED HEALTH CARE EDUCATION/TRAINING PROGRAM

## 2024-04-18 PROCEDURE — 99239 HOSP IP/OBS DSCHRG MGMT >30: CPT | Performed by: STUDENT IN AN ORGANIZED HEALTH CARE EDUCATION/TRAINING PROGRAM

## 2024-04-18 PROCEDURE — 6370000000 HC RX 637 (ALT 250 FOR IP): Performed by: STUDENT IN AN ORGANIZED HEALTH CARE EDUCATION/TRAINING PROGRAM

## 2024-04-18 PROCEDURE — 36415 COLL VENOUS BLD VENIPUNCTURE: CPT

## 2024-04-18 PROCEDURE — 85025 COMPLETE CBC W/AUTO DIFF WBC: CPT

## 2024-04-18 PROCEDURE — 6360000002 HC RX W HCPCS: Performed by: STUDENT IN AN ORGANIZED HEALTH CARE EDUCATION/TRAINING PROGRAM

## 2024-04-18 PROCEDURE — 02HV33Z INSERTION OF INFUSION DEVICE INTO SUPERIOR VENA CAVA, PERCUTANEOUS APPROACH: ICD-10-PCS | Performed by: STUDENT IN AN ORGANIZED HEALTH CARE EDUCATION/TRAINING PROGRAM

## 2024-04-18 PROCEDURE — 80048 BASIC METABOLIC PNL TOTAL CA: CPT

## 2024-04-18 RX ORDER — ALBUTEROL SULFATE 90 UG/1
2 AEROSOL, METERED RESPIRATORY (INHALATION) EVERY 4 HOURS PRN
Qty: 3 EACH | Refills: 1 | Status: SHIPPED | OUTPATIENT
Start: 2024-04-18 | End: 2025-04-18

## 2024-04-18 RX ADMIN — SODIUM CHLORIDE, PRESERVATIVE FREE 10 ML: 5 INJECTION INTRAVENOUS at 09:53

## 2024-04-18 RX ADMIN — CETIRIZINE HYDROCHLORIDE 10 MG: 10 TABLET, FILM COATED ORAL at 08:36

## 2024-04-18 RX ADMIN — PIPERACILLIN AND TAZOBACTAM 3375 MG: 3; .375 INJECTION, POWDER, LYOPHILIZED, FOR SOLUTION INTRAVENOUS at 03:01

## 2024-04-18 RX ADMIN — SIMETHICONE 160 MG: 80 TABLET, CHEWABLE ORAL at 06:44

## 2024-04-18 RX ADMIN — DULOXETINE HYDROCHLORIDE 60 MG: 60 CAPSULE, DELAYED RELEASE ORAL at 08:37

## 2024-04-18 RX ADMIN — PIPERACILLIN AND TAZOBACTAM 3375 MG: 3; .375 INJECTION, POWDER, LYOPHILIZED, FOR SOLUTION INTRAVENOUS at 15:58

## 2024-04-18 RX ADMIN — OXYCODONE AND ACETAMINOPHEN 1 TABLET: 5; 325 TABLET ORAL at 09:53

## 2024-04-18 RX ADMIN — ASPIRIN 81 MG CHEWABLE TABLET 81 MG: 81 TABLET CHEWABLE at 08:36

## 2024-04-18 RX ADMIN — SIMETHICONE 160 MG: 80 TABLET, CHEWABLE ORAL at 15:54

## 2024-04-18 RX ADMIN — ATORVASTATIN CALCIUM 10 MG: 10 TABLET, FILM COATED ORAL at 08:36

## 2024-04-18 RX ADMIN — SIMETHICONE 160 MG: 80 TABLET, CHEWABLE ORAL at 12:22

## 2024-04-18 RX ADMIN — LIDOCAINE HYDROCHLORIDE 2 ML: 10 SOLUTION INTRAVENOUS at 11:05

## 2024-04-18 RX ADMIN — CYANOCOBALAMIN TAB 1000 MCG 5000 MCG: 1000 TAB at 08:36

## 2024-04-18 RX ADMIN — Medication 1 TABLET: at 08:36

## 2024-04-18 RX ADMIN — PIPERACILLIN AND TAZOBACTAM 3375 MG: 3; .375 INJECTION, POWDER, LYOPHILIZED, FOR SOLUTION INTRAVENOUS at 09:59

## 2024-04-18 RX ADMIN — SODIUM CHLORIDE, PRESERVATIVE FREE 30 ML: 5 INJECTION INTRAVENOUS at 11:30

## 2024-04-18 RX ADMIN — BUPROPION HYDROCHLORIDE 150 MG: 150 TABLET, FILM COATED, EXTENDED RELEASE ORAL at 08:35

## 2024-04-18 RX ADMIN — PREGABALIN 200 MG: 75 CAPSULE ORAL at 08:36

## 2024-04-18 RX ADMIN — BUSPIRONE HYDROCHLORIDE 30 MG: 10 TABLET ORAL at 08:36

## 2024-04-18 RX ADMIN — SODIUM CHLORIDE, PRESERVATIVE FREE 10 ML: 5 INJECTION INTRAVENOUS at 08:36

## 2024-04-18 RX ADMIN — METOPROLOL SUCCINATE 25 MG: 25 TABLET, FILM COATED, EXTENDED RELEASE ORAL at 08:34

## 2024-04-18 RX ADMIN — CALCIUM 500 MG: 500 TABLET ORAL at 08:34

## 2024-04-18 ASSESSMENT — PAIN DESCRIPTION - LOCATION: LOCATION: ABDOMEN

## 2024-04-18 ASSESSMENT — PAIN SCALES - GENERAL: PAINLEVEL_OUTOF10: 9

## 2024-04-18 ASSESSMENT — PAIN DESCRIPTION - DESCRIPTORS: DESCRIPTORS: ACHING;DISCOMFORT

## 2024-04-18 ASSESSMENT — PAIN - FUNCTIONAL ASSESSMENT: PAIN_FUNCTIONAL_ASSESSMENT: ACTIVITIES ARE NOT PREVENTED

## 2024-04-18 ASSESSMENT — PAIN DESCRIPTION - ORIENTATION: ORIENTATION: LEFT;MID

## 2024-04-18 NOTE — PROGRESS NOTES
[facility] Quality Flow/Interdisciplinary Rounds Progress Note        Quality Flow Rounds held on April 18, 2024    Disciplines Attending:  Bedside Nurse, , , and Nursing Unit Leadership    Dimple Pabon was admitted on 4/12/2024 10:33 PM    Anticipated Discharge Date:       Disposition:    Tony Score:  Tony Scale Score: 21    Readmission Risk              Risk of Unplanned Readmission:  12           Discussed patient goal for the day, patient clinical progression, and barriers to discharge.  The following Goal(s) of the Day/Commitment(s) have been identified:  Diagnostics - Report Results and Labs - Report Results      Meli De La Paz RN  April 18, 2024

## 2024-04-18 NOTE — PROGRESS NOTES
Occupational Therapy  OT BEDSIDE TREATMENT NOTE      Date:2024  Patient Name: Dimple Pabon  MRN: [id number]  : 1970  Room: [address] [place], [place] [address]     Evaluating OT: Jennifer Esqueda OTR/L #[id number]     Referring Provider:  Johnny Page MD      Specific Provider Orders/Date:  OT Eval and Treat , 2024      Diagnosis:   1. Intra-abdominal abscess (HCC)    2. Generalized abdominal pain         Surgery: None        Pertinent Medical History: Asthma, HTN, IBS, sleep apnea      Precautions:  Fall Risk      Assessment of current deficits    [x] Functional mobility            [x]ADLs           [x] Strength                   []Cognition    [x] Functional transfers          [x] IADLs          [x] Safety Awareness   [x]Endurance    [] Fine Coordination              [x] Balance      [] Vision/perception    []Sensation      []Gross Motor Coordination  [] ROM           [] Delirium                   [] Motor Control      OT PLAN OF CARE   OT POC based on physician orders, patient diagnosis and results of clinical assessment     Frequency/Duration 2-5 days/wk for 2-4 weeks PRN      Specific OT Treatment Interventions to include:   * Instruction/training on adapted ADL techniques and AE recommendations to increase functional independence within precautions       * Training on energy conservation strategies, correct breathing pattern and techniques to improve independence/tolerance for self-care routine  * Functional transfer/mobility training/DME recommendations for increased independence, safety, and fall prevention  * Patient/Family education to increase follow through with safety techniques and functional independence  * Recommendation of environmental modifications for increased safety with functional transfers/mobility and ADLs  * Therapeutic exercise to improve motor endurance, ROM, and functional strength for ADLs/functional transfers  * Therapeutic activities to facilitate/challenge dynamic balance, stand tolerance for

## 2024-04-18 NOTE — PLAN OF CARE
Problem: Pain  Goal: Verbalizes/displays adequate comfort level or baseline comfort level  4/17/2024 2341 by Loyda Reynolds RN  Outcome: Progressing  4/17/2024 0944 by Cynthia Esparza RN  Outcome: Progressing     Problem: Respiratory - Adult  Goal: Achieves optimal ventilation and oxygenation  Outcome: Progressing     Problem: Skin/Tissue Integrity - Adult  Goal: Skin integrity remains intact  4/17/2024 2341 by Loyda Reynolds RN  Outcome: Progressing  4/17/2024 0944 by Cynthia Esparza RN  Outcome: Progressing     Problem: Musculoskeletal - Adult  Goal: Return mobility to safest level of function  Outcome: Progressing     Problem: Gastrointestinal - Adult  Goal: Minimal or absence of nausea and vomiting  Outcome: Progressing  Goal: Maintains or returns to baseline bowel function  Outcome: Progressing  Goal: Maintains adequate nutritional intake  Outcome: Progressing     Problem: Genitourinary - Adult  Goal: Absence of urinary retention  Outcome: Progressing     Problem: Metabolic/Fluid and Electrolytes - Adult  Goal: Electrolytes maintained within normal limits  Outcome: Progressing  Goal: Hemodynamic stability and optimal renal function maintained  Outcome: Progressing  Goal: Glucose maintained within prescribed range  Outcome: Progressing     Problem: Hematologic - Adult  Goal: Maintains hematologic stability  Outcome: Progressing     Problem: Safety - Adult  Goal: Free from fall injury  Outcome: Progressing     Problem: Chronic Conditions and Co-morbidities  Goal: Patient's chronic conditions and co-morbidity symptoms are monitored and maintained or improved  Outcome: Progressing

## 2024-04-18 NOTE — PROGRESS NOTES
CLINICAL PHARMACY NOTE: MEDS TO BEDS    Total # of Prescriptions Filled: 1   The following medications were delivered to the patient:  Albuterol HFA      Additional Documentation:     Patient is Scheduled with [facility] on 01-.    No Further Questions, Confirmation Letter Sent.  Thank you!    NEW PATIENT QUESTIONS     Who referred you to our clinic? Bakari Piña MD      What are your primary symptoms you would like to be addressed at your visit? ASTHMA    Have you seen an ALLERGIST: Yes     If yes who did you see? POSSIBLY WITH ENT SPECIALIST    Have you seen ENT?: Yes     If so who did you see? [facility] [place]     Have you been seen in the Emergency Department/ Urgent care for this issue? No    Any outside provider listed above should be faxed a request for records unless records are in Care Everywhere. Were records requested?  Yes

## 2024-04-18 NOTE — PROCEDURES
PICC  Catheter insertion date: 4/18/2024     Product Number: [id number]   Lot No: [id number]   Gauge: 17   Lumen: single/ 4.5FR   Rt brachial vein    Vein Diameter: 0.52cm   Arm circumference at insertion site: 34cm   Catheter Length: 35cm   Internal Length: 34cm   External Catheter Length: 1cm   Ultrasound Used: yes  RONALD Humphreyseye confirms PICC tip is placed in the lower 1/3 of the SVC or at the Cavoatrial junction.  Floor nurse notified PICC is okay to use.   : BREONNA Liz RN, URBAN  ASSISTANT: YELITZA QUACH,VA-BC

## 2024-04-18 NOTE — PROGRESS NOTES
Infectious Disease  Progress Note  [facility]    Chief Complaint: intraabdominal abscess    Subjective:  she is stable. No new complaints. No fever. Tolerating antibiotics well. Had PICC placed.     Scheduled Meds:   sodium chloride flush  5-40 mL IntraVENous 2 times per day    heparin flush  3 mL IntraVENous 2 times per day    metoprolol succinate  25 mg Oral Daily    buPROPion  150 mg Oral BID    pregabalin  200 mg Oral BID    DULoxetine  60 mg Oral BID    aspirin  81 mg Oral Daily    atorvastatin  10 mg Oral Daily    busPIRone  30 mg Oral BID    cetirizine  10 mg Oral Daily    sodium chloride flush  5-40 mL IntraVENous 2 times per day    piperacillin-tazobactam  3,375 mg IntraVENous Q8H    insulin lispro  0-8 Units SubCUTAneous 4x Daily WC    calcium elemental  1 tablet Oral BID    vitamin B-12  5,000 mcg Oral Daily    therapeutic multivitamin-minerals  1 tablet Oral BID    simethicone  160 mg Oral TID AC     Continuous Infusions:   sodium chloride      sodium chloride      dextrose       PRN Meds:sodium chloride flush, sodium chloride, heparin flush, albuterol, oxyCODONE-acetaminophen, sodium chloride flush, sodium chloride, ondansetron **OR** ondansetron, acetaminophen **OR** acetaminophen, polyethylene glycol, dextrose bolus **OR** dextrose bolus, glucagon (rDNA), dextrose, Glucose, diphenhydrAMINE    Prior to Admission medications    Medication Sig Start Date End Date Taking? Authorizing Provider   dicyclomine (BENTYL) 20 MG tablet Take 1 tablet by mouth every 6 hours as needed (abdominal pain) 4/13/24  Yes Tommy Marvin DO   levoFLOXacin (LEVAQUIN) 750 MG tablet Take 1 tablet by mouth daily for 5 days 4/13/24 4/18/24 Yes Tommy Marvin DO   Biotin 50098 MCG TABS Take 1 tablet by mouth daily   Yes ProviderYoel MD   Multiple Vitamins-Minerals (THERAPEUTIC MULTIVITAMIN-MINERALS) tablet Take 1 tablet by mouth in the morning and at bedtime   Yes Yoel Varma MD   calcium carbonate 600

## 2024-04-18 NOTE — DISCHARGE SUMMARY
[facility] Hospitalist Physician Discharge Summary       Zeus Allen, DO  [address] [facility] [address]  [phone]    Schedule an appointment as soon as possible for a visit in 3 days      Fidel Khanna MD  [address]  [address]  [facility] [address]  [phone]    Schedule an appointment as soon as possible for a visit   [facility]    [facility]  [address] [address]  [phone]        Rachell Estes MD  [address]  [address]  [facility] [address]  [phone]    Follow up        Activity level: As tolerated     Dispo: Home    Condition on discharge: Stable     Patient ID:  Dimple Pabon  [id number]  53 y.o.  1970    Admit date: 4/12/2024    Discharge date and time:  4/18/2024  1:24 PM    Admission Diagnoses: Principal Problem:    Intra-abdominal abscess (HCC)  Active Problems:    Essential hypertension    Type 2 diabetes mellitus with polyneuropathy (HCC)    Chronic pain syndrome    Acute cystitis without hematuria  Resolved Problems:    * No resolved hospital problems. *      Discharge Diagnoses: Principal Problem:    Intra-abdominal abscess (HCC)  Active Problems:    Essential hypertension    Type 2 diabetes mellitus with polyneuropathy (HCC)    Chronic pain syndrome    Acute cystitis without hematuria  Resolved Problems:    * No resolved hospital problems. *      Consults:  IP CONSULT TO GENERAL SURGERY  IP CONSULT TO IV TEAM  IP CONSULT TO IV TEAM  IP CONSULT TO INFECTIOUS DISEASES  IP CONSULT TO HOME CARE NEEDS    Procedures: None    Hospital Course:   Patient Dimple Pabon is a 53 y.o. presented with Generalized abdominal pain [R10.84]  Intra-abdominal abscess (HCC) [K65.1]  53-year-old female was admitted for evaluation of possible intra-abdominal abscess.  Patient was evaluated by general surgery and infectious disease and the plan for treatment with IV Zosyn.  Patient is medically stable for discharge home with

## 2024-04-18 NOTE — CARE COORDINATION
Updated plan of care. Patient needing IV antibiotics for home. [facility] can accept. [facility] order placed. Reached out to Shima from [facility] to see when start of care will be. [facility] following. Will need to fax signed script to [facility] once obtained. Still needing PICC line placed. Patient states she would like a wheeled walker from [facility]. Lc with [facility] called and a referral was made. DME order placed. Walker to be delivered today.   Electronically signed by Michelle Marie RN on 4/18/2024 at 9:47 AM    UPDATE: [facility] is unable to see the patient until Saturday. [facility] was following and are able to see tomorrow morning. Signed script obtained and faxed to Sidney with [facility].   Electronically signed by Michelle Marie RN on 4/18/2024 at 1:02 PM

## 2024-04-18 NOTE — PROGRESS NOTES
General Surgery   Daily Progress Note      Patient's Name/Date of Birth: Dimple Pabon / 1970    Date: April 18, 2024     Chief Complaint: Abdominal pain nausea     Patient Active Problem List   Diagnosis    Essential hypertension    Type 2 diabetes mellitus with polyneuropathy (HCC)    Class 3 severe obesity due to excess calories with serious comorbidity and body mass index (BMI) of 45.0 to 49.9 in adult (HCC)    Other insomnia    Anxiety    Recurrent major depressive disorder, in remission (HCC)    Idiopathic peripheral neuropathy    Herniated lumbar intervertebral disc    Lumbar spondylosis    Lumbar disc disorder    Lumbar radiculopathy    Chronic pain syndrome    Spinal stenosis of lumbar region without neurogenic claudication    Primary osteoarthritis of left hip    Factor 5 Leiden mutation, heterozygous (HCC)    Intra-abdominal abscess ([facility])    Acute cystitis without hematuria       Subjective: Doing well today, no pain no n/v. Tolerating diet and having BM still     Objective:  /61   Pulse 72   Temp 97.7 °F (36.5 °C) (Oral)   Resp 16   Ht 1.524 m (5')   Wt 79 kg (174 lb 2.6 oz)   LMP  (LMP Unknown)   SpO2 93%   BMI 34.01 kg/m²   Labs:  Recent Labs     04/17/24  1056 04/18/24  0322   WBC 7.9 8.0   HGB 11.0* 11.2*   HCT 34.4 36.0       Lab Results   Component Value Date    CREATININE 0.5 04/18/2024    BUN 5 (L) 04/18/2024     04/18/2024    K 4.3 04/18/2024     04/18/2024    CO2 32 (H) 04/18/2024     No results for input(s): \"LIPASE\", \"AMYLASE\" in the last 72 hours.      General appearance:  NAD  Head: NCAT, PERRLA, EOMI, red conjunctiva  Neck: supple, no masses  Lungs: symmetric chest rise, no audible wheezes  Heart: normal rate per peripheral pulse  Abdomen: soft, nondistended, non tender   Skin: no visible lesions  Gu: no cva tenderness  Extremities: extremities normal, atraumatic, no cyanosis or edema      Assessment/Plan:  Dimple Pabon is a 53 y.o. female with pelvic abscess

## 2024-04-19 ENCOUNTER — TELEPHONE (OUTPATIENT)
Dept: PRIMARY CARE CLINIC | Age: 54
End: 2024-04-19

## 2024-04-19 NOTE — TELEPHONE ENCOUNTER
Corrie calling from [facility] to let you know pt was added from [facility] today for PT, OT & Nursing she is also receiving IV antibiotics.

## 2024-04-22 ENCOUNTER — TELEPHONE (OUTPATIENT)
Dept: PRIMARY CARE CLINIC | Age: 54
End: 2024-04-22

## 2024-05-03 ENCOUNTER — HOSPITAL ENCOUNTER (OUTPATIENT)
Dept: CT IMAGING | Age: 54
End: 2024-05-03
Payer: MEDICARE

## 2024-05-03 DIAGNOSIS — R10.9 ABDOMINAL PAIN, UNSPECIFIED ABDOMINAL LOCATION: ICD-10-CM

## 2024-05-03 PROCEDURE — 74177 CT ABD & PELVIS W/CONTRAST: CPT

## 2024-05-03 PROCEDURE — 6360000004 HC RX CONTRAST MEDICATION: Performed by: RADIOLOGY

## 2024-05-03 RX ADMIN — IOPAMIDOL 75 ML: 755 INJECTION, SOLUTION INTRAVENOUS at 17:21

## 2024-05-03 RX ADMIN — IOPAMIDOL 18 ML: 755 INJECTION, SOLUTION INTRAVENOUS at 17:21

## 2024-05-14 ENCOUNTER — TELEPHONE (OUTPATIENT)
Dept: INFECTIOUS DISEASES | Age: 54
End: 2024-05-14

## 2024-05-14 NOTE — TELEPHONE ENCOUNTER
Spoke to Dr. Mckeon- bariatric surgeon at [place].   Patient saw him today.   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   So PICC was pulled by him in the clinic.    No need for further antibiotics  See me as needed.    Rachell Estes MD

## 2024-05-16 ENCOUNTER — OFFICE VISIT (OUTPATIENT)
Dept: PRIMARY CARE CLINIC | Age: 54
End: 2024-05-16
Payer: MEDICARE

## 2024-05-16 VITALS
BODY MASS INDEX: 35.54 KG/M2 | HEART RATE: 65 BPM | OXYGEN SATURATION: 97 % | DIASTOLIC BLOOD PRESSURE: 80 MMHG | SYSTOLIC BLOOD PRESSURE: 118 MMHG | TEMPERATURE: 97.6 F | WEIGHT: 182 LBS

## 2024-05-16 DIAGNOSIS — E11.42 TYPE 2 DIABETES MELLITUS WITH POLYNEUROPATHY (HCC): ICD-10-CM

## 2024-05-16 DIAGNOSIS — E66.01 OBESITY, CLASS III, BMI 40-49.9 (MORBID OBESITY) (HCC): ICD-10-CM

## 2024-05-16 DIAGNOSIS — D50.9 IRON DEFICIENCY ANEMIA, UNSPECIFIED IRON DEFICIENCY ANEMIA TYPE: ICD-10-CM

## 2024-05-16 DIAGNOSIS — R10.9 ABDOMINAL PAIN, UNSPECIFIED ABDOMINAL LOCATION: Primary | ICD-10-CM

## 2024-05-16 PROCEDURE — 99214 OFFICE O/P EST MOD 30 MIN: CPT | Performed by: INTERNAL MEDICINE

## 2024-05-16 PROCEDURE — 3074F SYST BP LT 130 MM HG: CPT | Performed by: INTERNAL MEDICINE

## 2024-05-16 PROCEDURE — 3079F DIAST BP 80-89 MM HG: CPT | Performed by: INTERNAL MEDICINE

## 2024-05-16 RX ORDER — DICYCLOMINE HCL 20 MG
20 TABLET ORAL EVERY 6 HOURS PRN
Qty: 360 TABLET | Refills: 0 | Status: SHIPPED | OUTPATIENT
Start: 2024-05-16

## 2024-05-16 RX ORDER — PIPERACILLIN SODIUM, TAZOBACTAM SODIUM 3; .375 G/15ML; G/15ML
INJECTION, POWDER, LYOPHILIZED, FOR SOLUTION INTRAVENOUS
COMMUNITY
Start: 2024-05-08 | End: 2024-05-16 | Stop reason: ALTCHOICE

## 2024-05-16 RX ORDER — FERROUS SULFATE 325(65) MG
1 TABLET ORAL DAILY
Qty: 90 TABLET | Refills: 1 | Status: SHIPPED | OUTPATIENT
Start: 2024-05-16

## 2024-05-16 NOTE — PROGRESS NOTES
mood and affect. Behavior is Normal.     ASSESSMENT AND PLAN:        Return in about 3 months (around 8/16/2024).    Electronically signed by Zeus Allen DO on 5/16/2024 at 12:23 PM    Zeus Allen DO    Assessment  Diagnoses and all orders for this visit:  Abdominal pain, unspecified abdominal location  -     External Referral To General Surgery  Iron deficiency anemia, unspecified iron deficiency anemia type  Type 2 diabetes mellitus with polyneuropathy (HCC)  Obesity, Class III, BMI 40-49.9 (morbid obesity) (HCC)  Other orders  -     dicyclomine (BENTYL) 20 MG tablet; Take 1 tablet by mouth every 6 hours as needed (abdominal pain)  -     FEROSUL 325 (65 Fe) MG tablet; Take 1 tablet by mouth daily

## 2024-05-20 ENCOUNTER — TELEPHONE (OUTPATIENT)
Dept: PRIMARY CARE CLINIC | Age: 54
End: 2024-05-20

## 2024-05-20 NOTE — TELEPHONE ENCOUNTER
[facility] called in regarding pt and stated that they are discharging her today for home health.

## 2024-05-22 ENCOUNTER — HOSPITAL ENCOUNTER (OUTPATIENT)
Dept: MAMMOGRAPHY | Age: 54
Discharge: HOME | End: 2024-05-24
Payer: MEDICARE

## 2024-05-22 VITALS — HEIGHT: 60 IN | BODY MASS INDEX: 35.14 KG/M2 | WEIGHT: 179 LBS

## 2024-05-22 DIAGNOSIS — Z12.31 ENCOUNTER FOR SCREENING MAMMOGRAM FOR MALIGNANT NEOPLASM OF BREAST: ICD-10-CM

## 2024-05-22 PROCEDURE — 77063 BREAST TOMOSYNTHESIS BI: CPT

## 2024-06-03 ENCOUNTER — TELEPHONE (OUTPATIENT)
Dept: PRIMARY CARE CLINIC | Age: 54
End: 2024-06-03

## 2024-06-03 DIAGNOSIS — E78.00 HYPERCHOLESTEROLEMIA: Primary | ICD-10-CM

## 2024-06-03 DIAGNOSIS — R73.01 IFG (IMPAIRED FASTING GLUCOSE): ICD-10-CM

## 2024-06-03 NOTE — TELEPHONE ENCOUNTER
She was referred to Dr. Salas from bariatrics. Does she want a different referral?    The lab orders were placed.

## 2024-06-03 NOTE — TELEPHONE ENCOUNTER
Called pt and asked if she would get blood work only. She said she is sorry and forgot about her appt on 05/30/2024. She stated she is going to [facility] tomorrow and stated she could the labs done tomorrow if you wanted her too?    She also wanted to know about the bariatrics doctor that she spoke to you about. She stated she wanted to get a 2nd opinion for the CT scans.        Please advise.

## 2024-06-04 ENCOUNTER — HOSPITAL ENCOUNTER (OUTPATIENT)
Age: 54
Discharge: HOME | End: 2024-06-04
Payer: MEDICARE

## 2024-06-04 DIAGNOSIS — R73.01 IFG (IMPAIRED FASTING GLUCOSE): ICD-10-CM

## 2024-06-04 DIAGNOSIS — E78.00 HYPERCHOLESTEROLEMIA: ICD-10-CM

## 2024-06-04 LAB
ALBUMIN SERPL-MCNC: 3.8 G/DL (ref 3.5–5.2)
ALP SERPL-CCNC: 130 U/L (ref 35–104)
ALT SERPL-CCNC: 13 U/L (ref 0–32)
ANION GAP SERPL CALCULATED.3IONS-SCNC: 7 MMOL/L (ref 7–16)
AST SERPL-CCNC: 19 U/L (ref 0–31)
BASOPHILS # BLD: 0.03 K/UL (ref 0–0.2)
BASOPHILS NFR BLD: 0 % (ref 0–2)
BILIRUB SERPL-MCNC: 0.5 MG/DL (ref 0–1.2)
BUN SERPL-MCNC: 8 MG/DL (ref 6–20)
CALCIUM SERPL-MCNC: 8.9 MG/DL (ref 8.6–10.2)
CHLORIDE SERPL-SCNC: 104 MMOL/L (ref 98–107)
CHOLEST SERPL-MCNC: 145 MG/DL
CO2 SERPL-SCNC: 29 MMOL/L (ref 22–29)
CREAT SERPL-MCNC: 0.6 MG/DL (ref 0.5–1)
EOSINOPHIL # BLD: 0.22 K/UL (ref 0.05–0.5)
EOSINOPHILS RELATIVE PERCENT: 2 % (ref 0–6)
ERYTHROCYTE [DISTWIDTH] IN BLOOD BY AUTOMATED COUNT: 14.9 % (ref 11.5–15)
GFR, ESTIMATED: >90 ML/MIN/1.73M2
GLUCOSE SERPL-MCNC: 85 MG/DL (ref 74–99)
HBA1C MFR BLD: 5.8 % (ref 4–5.6)
HCT VFR BLD AUTO: 39.5 % (ref 34–48)
HDLC SERPL-MCNC: 55 MG/DL
HGB BLD-MCNC: 12.8 G/DL (ref 11.5–15.5)
IMM GRANULOCYTES # BLD AUTO: <0.03 K/UL (ref 0–0.58)
IMM GRANULOCYTES NFR BLD: 0 % (ref 0–5)
LDLC SERPL CALC-MCNC: 71 MG/DL
LYMPHOCYTES NFR BLD: 4.09 K/UL (ref 1.5–4)
LYMPHOCYTES RELATIVE PERCENT: 45 % (ref 20–42)
MCH RBC QN AUTO: 27.8 PG (ref 26–35)
MCHC RBC AUTO-ENTMCNC: 32.4 G/DL (ref 32–34.5)
MCV RBC AUTO: 85.9 FL (ref 80–99.9)
MONOCYTES NFR BLD: 0.51 K/UL (ref 0.1–0.95)
MONOCYTES NFR BLD: 6 % (ref 2–12)
NEUTROPHILS NFR BLD: 46 % (ref 43–80)
NEUTS SEG NFR BLD: 4.19 K/UL (ref 1.8–7.3)
PLATELET # BLD AUTO: 242 K/UL (ref 130–450)
PMV BLD AUTO: 10 FL (ref 7–12)
POTASSIUM SERPL-SCNC: 4 MMOL/L (ref 3.5–5)
PROT SERPL-MCNC: 7.4 G/DL (ref 6.4–8.3)
RBC # BLD AUTO: 4.6 M/UL (ref 3.5–5.5)
SODIUM SERPL-SCNC: 140 MMOL/L (ref 132–146)
TRIGL SERPL-MCNC: 94 MG/DL
VLDLC SERPL CALC-MCNC: 19 MG/DL
WBC OTHER # BLD: 9.1 K/UL (ref 4.5–11.5)

## 2024-06-04 PROCEDURE — 85025 COMPLETE CBC W/AUTO DIFF WBC: CPT

## 2024-06-04 PROCEDURE — 83036 HEMOGLOBIN GLYCOSYLATED A1C: CPT

## 2024-06-04 PROCEDURE — 80061 LIPID PANEL: CPT

## 2024-06-04 PROCEDURE — 80053 COMPREHEN METABOLIC PANEL: CPT

## 2024-06-04 PROCEDURE — 36415 COLL VENOUS BLD VENIPUNCTURE: CPT

## 2024-07-02 ENCOUNTER — OFFICE VISIT (OUTPATIENT)
Dept: PRIMARY CARE CLINIC | Age: 54
End: 2024-07-02
Payer: MEDICARE

## 2024-07-02 VITALS
TEMPERATURE: 97 F | HEART RATE: 84 BPM | HEIGHT: 60 IN | OXYGEN SATURATION: 99 % | DIASTOLIC BLOOD PRESSURE: 80 MMHG | WEIGHT: 181 LBS | SYSTOLIC BLOOD PRESSURE: 128 MMHG | BODY MASS INDEX: 35.53 KG/M2

## 2024-07-02 DIAGNOSIS — Z00.00 MEDICARE ANNUAL WELLNESS VISIT, SUBSEQUENT: Primary | ICD-10-CM

## 2024-07-02 DIAGNOSIS — E11.42 TYPE 2 DIABETES MELLITUS WITH POLYNEUROPATHY (HCC): ICD-10-CM

## 2024-07-02 PROCEDURE — 3074F SYST BP LT 130 MM HG: CPT | Performed by: INTERNAL MEDICINE

## 2024-07-02 PROCEDURE — 3079F DIAST BP 80-89 MM HG: CPT | Performed by: INTERNAL MEDICINE

## 2024-07-02 PROCEDURE — 3044F HG A1C LEVEL LT 7.0%: CPT | Performed by: INTERNAL MEDICINE

## 2024-07-02 PROCEDURE — G0439 PPPS, SUBSEQ VISIT: HCPCS | Performed by: INTERNAL MEDICINE

## 2024-07-02 RX ORDER — CICLOPIROX 7.7 MG/G
GEL TOPICAL
Qty: 1 EACH | Refills: 1 | Status: SHIPPED | OUTPATIENT
Start: 2024-07-02

## 2024-07-02 SDOH — HEALTH STABILITY: PHYSICAL HEALTH: ON AVERAGE, HOW MANY DAYS PER WEEK DO YOU ENGAGE IN MODERATE TO STRENUOUS EXERCISE (LIKE A BRISK WALK)?: 7 DAYS

## 2024-07-02 SDOH — HEALTH STABILITY: PHYSICAL HEALTH: ON AVERAGE, HOW MANY MINUTES DO YOU ENGAGE IN EXERCISE AT THIS LEVEL?: 10 MIN

## 2024-07-02 ASSESSMENT — PATIENT HEALTH QUESTIONNAIRE - PHQ9
4. FEELING TIRED OR HAVING LITTLE ENERGY: MORE THAN HALF THE DAYS
SUM OF ALL RESPONSES TO PHQ9 QUESTIONS 1 & 2: 5
10. IF YOU CHECKED OFF ANY PROBLEMS, HOW DIFFICULT HAVE THESE PROBLEMS MADE IT FOR YOU TO DO YOUR WORK, TAKE CARE OF THINGS AT HOME, OR GET ALONG WITH OTHER PEOPLE: VERY DIFFICULT
1. LITTLE INTEREST OR PLEASURE IN DOING THINGS: NEARLY EVERY DAY
7. TROUBLE CONCENTRATING ON THINGS, SUCH AS READING THE NEWSPAPER OR WATCHING TELEVISION: NEARLY EVERY DAY
SUM OF ALL RESPONSES TO PHQ QUESTIONS 1-9: 21
6. FEELING BAD ABOUT YOURSELF - OR THAT YOU ARE A FAILURE OR HAVE LET YOURSELF OR YOUR FAMILY DOWN: NEARLY EVERY DAY
SUM OF ALL RESPONSES TO PHQ QUESTIONS 1-9: 21
8. MOVING OR SPEAKING SO SLOWLY THAT OTHER PEOPLE COULD HAVE NOTICED. OR THE OPPOSITE, BEING SO FIGETY OR RESTLESS THAT YOU HAVE BEEN MOVING AROUND A LOT MORE THAN USUAL: NEARLY EVERY DAY
SUM OF ALL RESPONSES TO PHQ QUESTIONS 1-9: 21
5. POOR APPETITE OR OVEREATING: MORE THAN HALF THE DAYS
9. THOUGHTS THAT YOU WOULD BE BETTER OFF DEAD, OR OF HURTING YOURSELF: NOT AT ALL
3. TROUBLE FALLING OR STAYING ASLEEP: NEARLY EVERY DAY
SUM OF ALL RESPONSES TO PHQ QUESTIONS 1-9: 21
2. FEELING DOWN, DEPRESSED OR HOPELESS: MORE THAN HALF THE DAYS

## 2024-07-02 ASSESSMENT — LIFESTYLE VARIABLES
HOW OFTEN DO YOU HAVE A DRINK CONTAINING ALCOHOL: 1
HOW MANY STANDARD DRINKS CONTAINING ALCOHOL DO YOU HAVE ON A TYPICAL DAY: 0
HOW OFTEN DO YOU HAVE SIX OR MORE DRINKS ON ONE OCCASION: 1
HOW OFTEN DO YOU HAVE A DRINK CONTAINING ALCOHOL: NEVER
HOW MANY STANDARD DRINKS CONTAINING ALCOHOL DO YOU HAVE ON A TYPICAL DAY: PATIENT DOES NOT DRINK

## 2024-07-02 NOTE — PATIENT INSTRUCTIONS
help?   Call 911 if you have symptoms of a heart attack. These may include:    Chest pain or pressure, or a strange feeling in the chest.     Sweating.     Shortness of breath.     Pain, pressure, or a strange feeling in the back, neck, jaw, or upper belly or in one or both shoulders or arms.     Lightheadedness or sudden weakness.     A fast or irregular heartbeat.   After you call 911, the  may tell you to chew 1 adult-strength or 2 to 4 low-dose aspirin. Wait for an ambulance. Do not try to drive yourself.  Watch closely for changes in your health, and be sure to contact your doctor if you have any problems.  Where can you learn more?  Go to https://www.[employer]net/patientEd and enter F075 to learn more about \"A Healthy Heart: Care Instructions.\"  Current as of: June 24, 2023  Content Version: 14.1  © 9770-5733 [employer].   Care instructions adapted under license by [employer]. If you have questions about a medical condition or this instruction, always ask your healthcare professional. [employer] disclaims any warranty or liability for your use of this information.      Personalized Preventive Plan for Dimple Pabon - 7/2/2024  Medicare offers a range of preventive health benefits. Some of the tests and screenings are paid in full while other may be subject to a deductible, co-insurance, and/or copay.    Some of these benefits include a comprehensive review of your medical history including lifestyle, illnesses that may run in your family, and various assessments and screenings as appropriate.    After reviewing your medical record and screening and assessments performed today your provider may have ordered immunizations, labs, imaging, and/or referrals for you.  A list of these orders (if applicable) as well as your Preventive Care list are included within your After Visit Summary for your review.    Other Preventive Recommendations:    A preventive eye exam performed by

## 2024-07-02 NOTE — PROGRESS NOTES
material    Safety:  Do you have working smoke detectors?: (!) No  Interventions:  See AVS for additional education material    ADL's:   Patient reports needing help with:  Select all that apply: (!) Bathing, Walking/Balance  Select all that apply: (!) Shopping, Food Preparation  Interventions:  See AVS for additional education material                  Objective   Vitals:    07/02/24 1540   BP: 128/80   Pulse: 84   Temp: 97 °F (36.1 °C)   SpO2: 99%   Weight: 82.1 kg (181 lb)   Height: 1.524 m (5')      Body mass index is 35.35 kg/m².             Allergies   Allergen Reactions    Brexpiprazole Other (See Comments)    Cefdinir      Facial erythema     Clemastine      Other reaction(s): Loose voice    Neurontin [Gabapentin]     Theophyllines     Toradol [Ketorolac Tromethamine]      Face erythema     Vicodin Hp [Hydrocodone-Acetaminophen]     Imodium [Loperamide] Nausea And Vomiting     Prior to Visit Medications    Medication Sig Taking? Authorizing Provider   Ciclopirox (LOPROX) 0.77 % gel Apply to nail twice a day Yes Zeus Allen DO   dicyclomine (BENTYL) 20 MG tablet Take 1 tablet by mouth every 6 hours as needed (abdominal pain) Yes Zeus Allen DO   FEROSUL 325 (65 Fe) MG tablet Take 1 tablet by mouth daily Yes Zeus Allen DO   albuterol sulfate HFA (PROVENTIL HFA) 108 (90 Base) MCG/ACT inhaler Inhale 2 puffs into the lungs every 4 hours as needed for Wheezing Yes Arash Valera MD   Biotin 42475 MCG TABS Take 1 tablet by mouth daily Yes Yoel Varma MD   Multiple Vitamins-Minerals (THERAPEUTIC MULTIVITAMIN-MINERALS) tablet Take 1 tablet by mouth in the morning and at bedtime Yes Yoel Varma MD   calcium carbonate 600 MG TABS tablet Take 1 tablet by mouth in the morning and at bedtime Yes Yoel Varma MD   DULoxetine (CYMBALTA) 60 MG extended release capsule Take 1 capsule by mouth 2 times daily Yes Yoel Varma MD   atorvastatin (LIPITOR) 10 MG

## 2024-07-11 ENCOUNTER — TELEPHONE (OUTPATIENT)
Dept: PRIMARY CARE CLINIC | Age: 54
End: 2024-07-11

## 2024-09-19 DIAGNOSIS — E78.00 ELEVATED CHOLESTEROL: ICD-10-CM

## 2024-09-19 RX ORDER — METOPROLOL SUCCINATE 50 MG/1
50 TABLET, EXTENDED RELEASE ORAL DAILY
Qty: 90 TABLET | Refills: 1 | Status: SHIPPED | OUTPATIENT
Start: 2024-09-19

## 2024-09-19 RX ORDER — ATORVASTATIN CALCIUM 10 MG/1
10 TABLET, FILM COATED ORAL DAILY
Qty: 90 TABLET | Refills: 1 | Status: SHIPPED | OUTPATIENT
Start: 2024-09-19

## 2024-11-01 ENCOUNTER — TELEPHONE (OUTPATIENT)
Dept: ENDOCRINOLOGY | Age: 54
End: 2024-11-01

## 2024-11-01 NOTE — TELEPHONE ENCOUNTER
Missed 10/29/24 appointment for 3 mos f/u next ov 3/12/24. Patient complaining of elevated blood sugars 230 to 50  x 1 mos. Patient just took this am and is at 91. Please advise patient for recommendations or if able to move appointment up sooner at  [phone].

## 2024-11-04 NOTE — TELEPHONE ENCOUNTER
Sent patient mychart message. Need more blood sugars and current meds. Advised pt to call for cancellations daily as well.

## 2024-11-06 NOTE — TELEPHONE ENCOUNTER
Called patient since she had not read the [employer] message yet. Asked pt if she could get us a records of her blood sugar and she says she doesn't have one. She says she just checks it periodically when her \"eye go blurry\" and when that happens her sugar is usually over 200. Advised pt to check blood sugar fasting and after dinner for a week and write them down and then call us so we can sen it to her provider and make recommendations until she can get in. Also told pt to call on available days and check for cancellations to get in sooner.

## 2024-12-04 ENCOUNTER — OFFICE VISIT (OUTPATIENT)
Dept: PODIATRY | Age: 54
End: 2024-12-04
Payer: MEDICARE

## 2024-12-04 VITALS
BODY MASS INDEX: 35.53 KG/M2 | OXYGEN SATURATION: 100 % | TEMPERATURE: 98.1 F | WEIGHT: 181 LBS | HEART RATE: 80 BPM | SYSTOLIC BLOOD PRESSURE: 118 MMHG | HEIGHT: 60 IN | DIASTOLIC BLOOD PRESSURE: 78 MMHG

## 2024-12-04 DIAGNOSIS — M79.675 PAIN IN LEFT TOE(S): ICD-10-CM

## 2024-12-04 DIAGNOSIS — R26.2 DIFFICULTY WALKING: ICD-10-CM

## 2024-12-04 DIAGNOSIS — E11.42 TYPE 2 DIABETES MELLITUS WITH POLYNEUROPATHY (HCC): Primary | ICD-10-CM

## 2024-12-04 DIAGNOSIS — B35.3 TINEA PEDIS OF BOTH FEET: ICD-10-CM

## 2024-12-04 DIAGNOSIS — B35.1 TINEA UNGUIUM: ICD-10-CM

## 2024-12-04 DIAGNOSIS — M79.674 PAIN IN RIGHT TOE(S): ICD-10-CM

## 2024-12-04 DIAGNOSIS — I73.9 PERIPHERAL VASCULAR DISEASE, UNSPECIFIED (HCC): ICD-10-CM

## 2024-12-04 PROCEDURE — 3078F DIAST BP <80 MM HG: CPT | Performed by: PODIATRIST

## 2024-12-04 PROCEDURE — 11721 DEBRIDE NAIL 6 OR MORE: CPT | Performed by: PODIATRIST

## 2024-12-04 PROCEDURE — 3074F SYST BP LT 130 MM HG: CPT | Performed by: PODIATRIST

## 2024-12-04 PROCEDURE — 99203 OFFICE O/P NEW LOW 30 MIN: CPT | Performed by: PODIATRIST

## 2024-12-04 PROCEDURE — 3044F HG A1C LEVEL LT 7.0%: CPT | Performed by: PODIATRIST

## 2024-12-04 RX ORDER — HYDROXYZINE PAMOATE 25 MG/1
CAPSULE ORAL
COMMUNITY
Start: 2024-11-14

## 2024-12-04 RX ORDER — PREGABALIN 200 MG/1
200 CAPSULE ORAL
Qty: 42 CAPSULE | Refills: 0 | Status: SHIPPED | OUTPATIENT
Start: 2024-12-04 | End: 2024-12-18

## 2024-12-04 RX ORDER — CICLOPIROX 7.7 MG/G
GEL TOPICAL
Qty: 1 EACH | Refills: 1 | Status: SHIPPED | OUTPATIENT
Start: 2024-12-04

## 2024-12-04 RX ORDER — PREGABALIN 200 MG/1
200 CAPSULE ORAL
Qty: 270 CAPSULE | Refills: 0 | Status: SHIPPED | OUTPATIENT
Start: 2024-12-04 | End: 2025-03-04

## 2024-12-04 RX ORDER — MIRTAZAPINE 15 MG/1
15 TABLET, FILM COATED ORAL NIGHTLY
COMMUNITY
Start: 2024-11-14

## 2024-12-04 NOTE — PROGRESS NOTES
[facility] [place] [facility]  [address] [address]  Dept: [phone]  Dept Fax: [phone]  Loc: [phone]    DIABETIC NAIL PROGRESS NOTE  Date of patient's visit: 12/4/2024  Patient's Name:  Dimple Pabon YOB: 1970            Patient Care Team:  Zeus Allen DO as PCP - General (Internal Medicine)  Zeus Allen DO as PCP - Empaneled Provider          Chief Complaint   Patient presents with    Diabetes    Toe Pain     Patient is her DM check and nail care  Zeus Allen DO 7/2/24         Subjective:   Dimple Pabon comes to clinic for Diabetes and Toe Pain (Patient is her DM check and nail care  Zeus Allen DO 7/2/24/)    she is a diabetic and states that diabetic neuropathy.  This new patient is seen at referral from her family doctor regarding diabetic nail care as well as neuropathy.  Patient relates that she has been seeing a neurologist but her neuropathy has gotten really bad in her feet burning sensation starting at the ball of the feet she is on 200 mg of Lyrica twice a day but she has breakthrough pain all afternoon..  Pt currently has complaint of thickened, elongated nails that they cannot manage by themselves.   Pt's primary care physician is Zeus Allen DO    .     Lab Results   Component Value Date    LABA1C 5.8 (H) 06/04/2024      Complains of numbness in the feet bilat.  Past Medical History:   Diagnosis Date    Asthma     Cirrhosis of liver (HCC)     DM (diabetes mellitus) (HCC)     GERD (gastroesophageal reflux disease)     History of gastric bypass 04/10/2023    Hypertension     IBS (irritable bowel syndrome)     OCD (obsessive compulsive disorder)     Sleep apnea     Stomach cancer (HCC)        Allergies   Allergen Reactions    Brexpiprazole Other (See Comments)    Cefdinir      Facial erythema     Clemastine      Other reaction(s): Loose voice    Neurontin

## 2025-01-24 LAB — DIABETIC RETINOPATHY: NEGATIVE

## 2025-02-06 DIAGNOSIS — Z12.11 COLON CANCER SCREENING: Primary | ICD-10-CM

## 2025-03-05 ENCOUNTER — PROCEDURE VISIT (OUTPATIENT)
Dept: PODIATRY | Age: 55
End: 2025-03-05
Payer: MEDICARE

## 2025-03-05 VITALS
WEIGHT: 181 LBS | BODY MASS INDEX: 35.35 KG/M2 | DIASTOLIC BLOOD PRESSURE: 82 MMHG | OXYGEN SATURATION: 94 % | HEART RATE: 72 BPM | TEMPERATURE: 97.5 F | SYSTOLIC BLOOD PRESSURE: 132 MMHG

## 2025-03-05 DIAGNOSIS — B35.1 TINEA UNGUIUM: ICD-10-CM

## 2025-03-05 DIAGNOSIS — I73.9 PERIPHERAL VASCULAR DISEASE, UNSPECIFIED: ICD-10-CM

## 2025-03-05 DIAGNOSIS — M79.674 PAIN IN RIGHT TOE(S): ICD-10-CM

## 2025-03-05 DIAGNOSIS — R26.2 DIFFICULTY WALKING: ICD-10-CM

## 2025-03-05 DIAGNOSIS — M79.675 PAIN IN LEFT TOE(S): ICD-10-CM

## 2025-03-05 DIAGNOSIS — E11.42 TYPE 2 DIABETES MELLITUS WITH POLYNEUROPATHY (HCC): Primary | ICD-10-CM

## 2025-03-05 PROCEDURE — 3075F SYST BP GE 130 - 139MM HG: CPT | Performed by: PODIATRIST

## 2025-03-05 PROCEDURE — 11721 DEBRIDE NAIL 6 OR MORE: CPT | Performed by: PODIATRIST

## 2025-03-05 PROCEDURE — 99213 OFFICE O/P EST LOW 20 MIN: CPT | Performed by: PODIATRIST

## 2025-03-05 PROCEDURE — 3079F DIAST BP 80-89 MM HG: CPT | Performed by: PODIATRIST

## 2025-03-05 RX ORDER — AMMONIUM LACTATE 12 G/100G
LOTION TOPICAL
Qty: 225 ML | Refills: 0 | Status: SHIPPED | OUTPATIENT
Start: 2025-03-05

## 2025-03-05 RX ORDER — PREGABALIN 200 MG/1
200 CAPSULE ORAL
Qty: 270 CAPSULE | Refills: 0 | Status: SHIPPED | OUTPATIENT
Start: 2025-03-05 | End: 2025-06-03

## 2025-03-05 NOTE — PROGRESS NOTES
[facility] [place] [facility]  [address] [address]  Dept: [phone]  Dept Fax: [phone]  Loc: [phone]    DIABETIC NAIL PROGRESS NOTE  Date of patient's visit: 3/5/2025  Patient's Name:  Dimple Pabon YOB: 1970            Patient Care Team:  Zeus Allen DO as PCP - General (Internal Medicine)  [facility]  Abundio Miller DPM as Consulting Physician (Foot and Ankle Surgery)          Chief Complaint   Patient presents with    Toe Pain     Zeus Allen,   LOV 11/1/24      Foot Pain     Lyrica ck increase    Diabetes       Subjective:   Dimple Pabon comes to clinic for Toe Pain (Zeus Allen, /LOV 11/1/24/), Foot Pain (Lyrica ck increase), and Diabetes    she is a diabetic and states that diabetic neuropathy.  This new patient is seen at referral from her family doctor regarding diabetic nail care as well as neuropathy.  Doing better on the 200 mg 3 times a day.  Pt currently has complaint of thickened, elongated nails that they cannot manage by themselves.   Pt's primary care physician is Zeus Allen DO    .     Lab Results   Component Value Date    LABA1C 5.8 (H) 06/04/2024      Complains of numbness in the feet bilat.  Past Medical History:   Diagnosis Date    Asthma     Cirrhosis of liver (HCC)     DM (diabetes mellitus) (HCC)     GERD (gastroesophageal reflux disease)     History of gastric bypass 04/10/2023    Hypertension     IBS (irritable bowel syndrome)     OCD (obsessive compulsive disorder)     Sleep apnea     Stomach cancer (HCC)        Allergies   Allergen Reactions    Brexpiprazole Other (See Comments)    Cefdinir      Facial erythema     Clemastine      Other reaction(s): Loose voice    Neurontin [Gabapentin]     Theophyllines     Toradol [Ketorolac Tromethamine]      Face erythema     Vicodin Hp [Hydrocodone-Acetaminophen]

## 2025-03-12 ENCOUNTER — TELEMEDICINE (OUTPATIENT)
Dept: ENDOCRINOLOGY | Age: 55
End: 2025-03-12
Payer: MEDICARE

## 2025-03-12 DIAGNOSIS — E78.2 MIXED HYPERLIPIDEMIA: ICD-10-CM

## 2025-03-12 DIAGNOSIS — E66.09 CLASS 2 OBESITY DUE TO EXCESS CALORIES WITHOUT SERIOUS COMORBIDITY WITH BODY MASS INDEX (BMI) OF 35.0 TO 35.9 IN ADULT: ICD-10-CM

## 2025-03-12 DIAGNOSIS — E66.812 CLASS 2 OBESITY DUE TO EXCESS CALORIES WITHOUT SERIOUS COMORBIDITY WITH BODY MASS INDEX (BMI) OF 35.0 TO 35.9 IN ADULT: ICD-10-CM

## 2025-03-12 DIAGNOSIS — E11.42 TYPE 2 DIABETES MELLITUS WITH POLYNEUROPATHY (HCC): Primary | ICD-10-CM

## 2025-03-12 DIAGNOSIS — E16.2 HYPOGLYCEMIA: ICD-10-CM

## 2025-03-12 PROCEDURE — 99214 OFFICE O/P EST MOD 30 MIN: CPT | Performed by: CLINICAL NURSE SPECIALIST

## 2025-03-12 PROCEDURE — G2211 COMPLEX E/M VISIT ADD ON: HCPCS | Performed by: CLINICAL NURSE SPECIALIST

## 2025-03-12 RX ORDER — LANCETS 33 GAUGE
EACH MISCELLANEOUS
Qty: 100 EACH | Refills: 11 | Status: SHIPPED | OUTPATIENT
Start: 2025-03-12

## 2025-03-12 NOTE — PROGRESS NOTES
(irritable bowel syndrome)     OCD (obsessive compulsive disorder)     Sleep apnea     Stomach cancer (HCC)        PAST SURGICAL HISTORY   Past Surgical History:   Procedure Laterality Date    COLONOSCOPY      EPIDURAL STEROID INJECTION Bilateral 10/3/2019    BILATERAL L5 - S1 TRANSFORAMINAL EPIDURAL STEROID INJECTION performed by Celio Quintana MD at [facility] OR    NERVE BLOCK Bilateral 10/03/2019    BILATERAL L5-S1 TRANSFORAMINAL EPIDURAL STEROID INJECTION    UPPER GASTROINTESTINAL ENDOSCOPY      stomach cancer removed       SOCIAL HISTORY   Tobacco:   reports that she has never smoked. She has never used smokeless tobacco.  Alcohol:   reports no history of alcohol use.  Drugs:   reports no history of drug use.    FAMILY HISTORY   Family History   Problem Relation Age of Onset    Colon Cancer Mother     Cancer Other     Depression Other     Diabetes Other     Heart Disease Other     Mental Illness Other     Stroke Other        ALLERGIES AND DRUG REACTIONS   Allergies   Allergen Reactions    Brexpiprazole Other (See Comments)    Cefdinir      Facial erythema     Clemastine      Other reaction(s): Loose voice    Neurontin [Gabapentin]     Theophyllines     Toradol [Ketorolac Tromethamine]      Face erythema     Vicodin Hp [Hydrocodone-Acetaminophen]     Imodium [Loperamide] Nausea And Vomiting       CURRENT MEDICATIONS   Current Outpatient Medications   Medication Sig Dispense Refill    OneTouch Delica Lancets 33G MISC Check blood glucose once a day 100 each 11    pregabalin (LYRICA) 200 MG capsule Take 1 capsule by mouth 3 times daily (with meals) for 90 days. Max Daily Amount: 600 mg 270 capsule 0    ammonium lactate (LAC-HYDRIN) 12 % lotion Apply topically as needed. 225 mL 0    hydrOXYzine pamoate (VISTARIL) 25 MG capsule TAKE 1 CAPSULE 3 TIMES A   DAY AS NEEDED FOR          ANXIETY/PANIC-DO NOT TAKE  WITH BENADRYL      Ciclopirox (LOPROX) 0.77 % gel Apply to nail twice a day 1 each 1    atorvastatin

## 2025-03-18 DIAGNOSIS — E78.00 ELEVATED CHOLESTEROL: ICD-10-CM

## 2025-03-18 RX ORDER — METOPROLOL SUCCINATE 50 MG/1
50 TABLET, EXTENDED RELEASE ORAL DAILY
Qty: 90 TABLET | Refills: 1 | Status: SHIPPED | OUTPATIENT
Start: 2025-03-18

## 2025-03-18 RX ORDER — ATORVASTATIN CALCIUM 10 MG/1
10 TABLET, FILM COATED ORAL DAILY
Qty: 90 TABLET | Refills: 1 | Status: SHIPPED | OUTPATIENT
Start: 2025-03-18

## 2025-03-18 NOTE — TELEPHONE ENCOUNTER
Patients last appointment 7/2/2024.  Patients next scheduled appointment   Future Appointments   Date Time Provider Department Center   3/21/2025  3:45 PM Zeus Allen DO [facility]   6/4/2025  9:00 AM Abundio Miller DPM [facility]

## 2025-03-25 ENCOUNTER — FOLLOWUP TELEPHONE ENCOUNTER (OUTPATIENT)
Dept: ENDOCRINOLOGY | Age: 55
End: 2025-03-25

## 2025-03-25 DIAGNOSIS — E66.09 CLASS 2 OBESITY DUE TO EXCESS CALORIES WITHOUT SERIOUS COMORBIDITY WITH BODY MASS INDEX (BMI) OF 35.0 TO 35.9 IN ADULT: ICD-10-CM

## 2025-03-25 DIAGNOSIS — E11.42 TYPE 2 DIABETES MELLITUS WITH POLYNEUROPATHY (HCC): Primary | ICD-10-CM

## 2025-03-25 DIAGNOSIS — E66.812 CLASS 2 OBESITY DUE TO EXCESS CALORIES WITHOUT SERIOUS COMORBIDITY WITH BODY MASS INDEX (BMI) OF 35.0 TO 35.9 IN ADULT: ICD-10-CM

## 2025-03-25 DIAGNOSIS — E78.2 MIXED HYPERLIPIDEMIA: ICD-10-CM

## 2025-03-25 NOTE — PROGRESS NOTES
3/25/25  Diabetes dietitian, Dr Chaves's office, contacted patient via phone. Pt is post gastric bypass, states she gained her weight lost back. Pt states she is struggling with compulsive overeating, she is in counseling. Pt would still like to meet with dietitian. Requested referral for diabetes education MNT at [facility]. Will follow as needed.

## 2025-04-08 LAB — NONINV COLON CA DNA+OCC BLD SCRN STL QL: NEGATIVE

## 2025-04-09 ENCOUNTER — RESULTS FOLLOW-UP (OUTPATIENT)
Dept: PRIMARY CARE CLINIC | Age: 55
End: 2025-04-09

## 2025-04-27 DIAGNOSIS — E11.42 TYPE 2 DIABETES MELLITUS WITH POLYNEUROPATHY (HCC): ICD-10-CM

## 2025-04-28 RX ORDER — BLOOD SUGAR DIAGNOSTIC
STRIP MISCELLANEOUS
Qty: 100 STRIP | Refills: 3 | Status: SHIPPED | OUTPATIENT
Start: 2025-04-28

## 2025-05-09 ENCOUNTER — OFFICE VISIT (OUTPATIENT)
Dept: PRIMARY CARE CLINIC | Age: 55
End: 2025-05-09

## 2025-05-09 VITALS
WEIGHT: 190 LBS | HEART RATE: 64 BPM | TEMPERATURE: 96.9 F | BODY MASS INDEX: 37.3 KG/M2 | HEIGHT: 60 IN | DIASTOLIC BLOOD PRESSURE: 72 MMHG | SYSTOLIC BLOOD PRESSURE: 112 MMHG | OXYGEN SATURATION: 97 %

## 2025-05-09 DIAGNOSIS — Z11.59 NEED FOR HEPATITIS C SCREENING TEST: ICD-10-CM

## 2025-05-09 DIAGNOSIS — Z13.31 POSITIVE DEPRESSION SCREENING: ICD-10-CM

## 2025-05-09 DIAGNOSIS — E53.8 VITAMIN B 12 DEFICIENCY: ICD-10-CM

## 2025-05-09 DIAGNOSIS — E11.9 CONTROLLED TYPE 2 DIABETES MELLITUS WITHOUT COMPLICATION, WITHOUT LONG-TERM CURRENT USE OF INSULIN (HCC): ICD-10-CM

## 2025-05-09 DIAGNOSIS — E55.9 VITAMIN D DEFICIENCY: ICD-10-CM

## 2025-05-09 DIAGNOSIS — Z00.00 MEDICARE ANNUAL WELLNESS VISIT, SUBSEQUENT: Primary | ICD-10-CM

## 2025-05-09 DIAGNOSIS — Z98.84 HISTORY OF GASTRIC BYPASS: ICD-10-CM

## 2025-05-09 DIAGNOSIS — Z11.4 ENCOUNTER FOR SCREENING FOR HIV: ICD-10-CM

## 2025-05-09 LAB
ALBUMIN: 4.2 G/DL (ref 3.5–5.2)
ALP BLD-CCNC: 144 U/L (ref 35–104)
ALT SERPL-CCNC: 13 U/L (ref 0–35)
ANION GAP SERPL CALCULATED.3IONS-SCNC: 13 MMOL/L (ref 7–16)
AST SERPL-CCNC: 22 U/L (ref 0–35)
BASOPHILS ABSOLUTE: 0.03 K/UL (ref 0–0.2)
BASOPHILS RELATIVE PERCENT: 0 % (ref 0–2)
BILIRUB SERPL-MCNC: 0.7 MG/DL (ref 0–1.2)
BUN BLDV-MCNC: 7 MG/DL (ref 6–20)
CALCIUM SERPL-MCNC: 9.6 MG/DL (ref 8.6–10)
CHLORIDE BLD-SCNC: 101 MMOL/L (ref 98–107)
CHOLESTEROL, TOTAL: 183 MG/DL
CO2: 27 MMOL/L (ref 22–29)
CREAT SERPL-MCNC: 0.6 MG/DL (ref 0.5–1)
CREATININE URINE: 85.3 MG/DL (ref 29–226)
EOSINOPHILS ABSOLUTE: 0.38 K/UL (ref 0.05–0.5)
EOSINOPHILS RELATIVE PERCENT: 5 % (ref 0–6)
GFR, ESTIMATED: >90 ML/MIN/1.73M2
GLUCOSE BLD-MCNC: 99 MG/DL (ref 74–99)
HCT VFR BLD CALC: 45.9 % (ref 34–48)
HDLC SERPL-MCNC: 62 MG/DL
HEMOGLOBIN: 15.1 G/DL (ref 11.5–15.5)
IMMATURE GRANULOCYTES %: 0 % (ref 0–5)
IMMATURE GRANULOCYTES ABSOLUTE: <0.03 K/UL (ref 0–0.58)
LDL CHOLESTEROL: 89 MG/DL
LYMPHOCYTES ABSOLUTE: 3.02 K/UL (ref 1.5–4)
LYMPHOCYTES RELATIVE PERCENT: 36 % (ref 20–42)
MCH RBC QN AUTO: 29.4 PG (ref 26–35)
MCHC RBC AUTO-ENTMCNC: 32.9 G/DL (ref 32–34.5)
MCV RBC AUTO: 89.5 FL (ref 80–99.9)
MICROALBUMIN/CREAT 24H UR: <12 MG/L (ref 0–20)
MICROALBUMIN/CREAT UR-RTO: <14 MCG/MG CREAT (ref 0–30)
MONOCYTES ABSOLUTE: 0.36 K/UL (ref 0.1–0.95)
MONOCYTES RELATIVE PERCENT: 4 % (ref 2–12)
NEUTROPHILS ABSOLUTE: 4.6 K/UL (ref 1.8–7.3)
NEUTROPHILS RELATIVE PERCENT: 55 % (ref 43–80)
PDW BLD-RTO: 13.4 % (ref 11.5–15)
PLATELET # BLD: 234 K/UL (ref 130–450)
PMV BLD AUTO: 10.2 FL (ref 7–12)
POTASSIUM SERPL-SCNC: 3.9 MMOL/L (ref 3.5–5.1)
RBC # BLD: 5.13 M/UL (ref 3.5–5.5)
SODIUM BLD-SCNC: 141 MMOL/L (ref 136–145)
TOTAL PROTEIN: 8 G/DL (ref 6.4–8.3)
TRIGL SERPL-MCNC: 159 MG/DL
VITAMIN B-12: 315 PG/ML (ref 232–1245)
VITAMIN D 25-HYDROXY: 36.3 NG/ML (ref 30–100)
VLDLC SERPL CALC-MCNC: 32 MG/DL
WBC # BLD: 8.4 K/UL (ref 4.5–11.5)

## 2025-05-09 RX ORDER — METOPROLOL SUCCINATE 100 MG/1
100 TABLET, EXTENDED RELEASE ORAL DAILY
Qty: 30 TABLET | Refills: 5 | Status: SHIPPED | OUTPATIENT
Start: 2025-05-09

## 2025-05-09 RX ORDER — DICYCLOMINE HCL 20 MG
20 TABLET ORAL EVERY 6 HOURS PRN
Qty: 360 TABLET | Refills: 0 | Status: SHIPPED | OUTPATIENT
Start: 2025-05-09

## 2025-05-09 RX ORDER — TRAZODONE HYDROCHLORIDE 50 MG/1
50 TABLET ORAL NIGHTLY
COMMUNITY
Start: 2025-02-13

## 2025-05-09 SDOH — ECONOMIC STABILITY: FOOD INSECURITY: WITHIN THE PAST 12 MONTHS, YOU WORRIED THAT YOUR FOOD WOULD RUN OUT BEFORE YOU GOT MONEY TO BUY MORE.: NEVER TRUE

## 2025-05-09 SDOH — ECONOMIC STABILITY: FOOD INSECURITY: WITHIN THE PAST 12 MONTHS, THE FOOD YOU BOUGHT JUST DIDN'T LAST AND YOU DIDN'T HAVE MONEY TO GET MORE.: NEVER TRUE

## 2025-05-09 ASSESSMENT — PATIENT HEALTH QUESTIONNAIRE - PHQ9
7. TROUBLE CONCENTRATING ON THINGS, SUCH AS READING THE NEWSPAPER OR WATCHING TELEVISION: NEARLY EVERY DAY
9. THOUGHTS THAT YOU WOULD BE BETTER OFF DEAD, OR OF HURTING YOURSELF: NOT AT ALL
2. FEELING DOWN, DEPRESSED OR HOPELESS: MORE THAN HALF THE DAYS
SUM OF ALL RESPONSES TO PHQ QUESTIONS 1-9: 18
SUM OF ALL RESPONSES TO PHQ QUESTIONS 1-9: 18
6. FEELING BAD ABOUT YOURSELF - OR THAT YOU ARE A FAILURE OR HAVE LET YOURSELF OR YOUR FAMILY DOWN: NEARLY EVERY DAY
8. MOVING OR SPEAKING SO SLOWLY THAT OTHER PEOPLE COULD HAVE NOTICED. OR THE OPPOSITE, BEING SO FIGETY OR RESTLESS THAT YOU HAVE BEEN MOVING AROUND A LOT MORE THAN USUAL: NOT AT ALL
10. IF YOU CHECKED OFF ANY PROBLEMS, HOW DIFFICULT HAVE THESE PROBLEMS MADE IT FOR YOU TO DO YOUR WORK, TAKE CARE OF THINGS AT HOME, OR GET ALONG WITH OTHER PEOPLE: VERY DIFFICULT
3. TROUBLE FALLING OR STAYING ASLEEP: NEARLY EVERY DAY
SUM OF ALL RESPONSES TO PHQ QUESTIONS 1-9: 18
4. FEELING TIRED OR HAVING LITTLE ENERGY: SEVERAL DAYS
1. LITTLE INTEREST OR PLEASURE IN DOING THINGS: NEARLY EVERY DAY
SUM OF ALL RESPONSES TO PHQ QUESTIONS 1-9: 18
5. POOR APPETITE OR OVEREATING: NEARLY EVERY DAY

## 2025-05-09 NOTE — PROGRESS NOTES
plan was agreed upon to work toward a weight loss goal of 15 pounds: lower carbohydrate diet and increase physical activity, as tolerated. Educational materials for weight loss were provided.  Patient will follow-up in 6 month(s) with PCP. Time spent counseling patient: 12 minutes     Hearing Screen:  Do you or your family notice any trouble with your hearing that hasn't been managed with hearing aids?: (!) Yes    Interventions:  See AVS for additional education material    Vision Screen:  Do you have difficulty driving, watching TV, or doing any of your daily activities because of your eyesight?: (!) Yes  Have you had an eye exam within the past year?: Yes  Interventions:   See AVS for additional education material    Safety:  Do you have working smoke detectors?: (!) No  Interventions:  See AVS for additional education material                   Objective   Vitals:    05/09/25 1044   BP: 112/72   Pulse: 64   Temp: 96.9 °F (36.1 °C)   SpO2: 97%   Weight: 86.2 kg (190 lb)   Height: 1.524 m (5')      Body mass index is 37.11 kg/m².      Physical Exam                Allergies   Allergen Reactions    Brexpiprazole Other (See Comments)    Cefdinir      Facial erythema     Clemastine      Other reaction(s): Loose voice    Neurontin [Gabapentin]     Theophyllines     Toradol [Ketorolac Tromethamine]      Face erythema     Vicodin Hp [Hydrocodone-Acetaminophen]     Imodium [Loperamide] Nausea And Vomiting     Prior to Visit Medications    Medication Sig Taking? Authorizing Provider   traZODone (DESYREL) 50 MG tablet Take 1 tablet by mouth nightly Yes Provider, Historical, MD   Handicap Placard MISC by Does not apply route Dx - Gait instability  Duration - 5 years from today's date Yes Zeus Allen, DO   dicyclomine (BENTYL) 20 MG tablet Take 1 tablet by mouth every 6 hours as needed (abdominal pain) Yes Zeus Allen, DO   metoprolol succinate (TOPROL XL) 100 MG extended release tablet Take 1 tablet by mouth

## 2025-05-11 LAB
HBA1C MFR BLD: 5.7 % (ref 4–5.6)
HEPATITIS C ANTIBODY: NONREACTIVE
HIV AG/AB: NONREACTIVE

## 2025-05-12 ENCOUNTER — RESULTS FOLLOW-UP (OUTPATIENT)
Dept: PRIMARY CARE CLINIC | Age: 55
End: 2025-05-12

## 2025-05-12 NOTE — RESULT ENCOUNTER NOTE
Unremarkable hemoglobin A1c.    Noted elevated triglycerides. she should exercise 150 min/ week, watch her diet and reduce alcohol consumption. They should attempt to lose 5-10% of their bodyweight.     Noted alkaline phosphatase with normal liver enzymes. In the future, she may benefit from a bone density test.

## 2025-06-18 ENCOUNTER — PROCEDURE VISIT (OUTPATIENT)
Dept: PODIATRY | Age: 55
End: 2025-06-18
Payer: MEDICARE

## 2025-06-18 VITALS
TEMPERATURE: 97.4 F | WEIGHT: 190 LBS | BODY MASS INDEX: 37.11 KG/M2 | SYSTOLIC BLOOD PRESSURE: 137 MMHG | DIASTOLIC BLOOD PRESSURE: 73 MMHG

## 2025-06-18 DIAGNOSIS — M79.674 PAIN IN RIGHT TOE(S): ICD-10-CM

## 2025-06-18 DIAGNOSIS — B35.1 TINEA UNGUIUM: ICD-10-CM

## 2025-06-18 DIAGNOSIS — E11.42 TYPE 2 DIABETES MELLITUS WITH POLYNEUROPATHY (HCC): ICD-10-CM

## 2025-06-18 DIAGNOSIS — M79.675 PAIN IN LEFT TOE(S): ICD-10-CM

## 2025-06-18 DIAGNOSIS — R26.2 DIFFICULTY WALKING: Primary | ICD-10-CM

## 2025-06-18 DIAGNOSIS — I73.9 PERIPHERAL VASCULAR DISEASE, UNSPECIFIED: ICD-10-CM

## 2025-06-18 PROCEDURE — 11721 DEBRIDE NAIL 6 OR MORE: CPT | Performed by: PODIATRIST

## 2025-06-18 PROCEDURE — 3075F SYST BP GE 130 - 139MM HG: CPT | Performed by: PODIATRIST

## 2025-06-18 PROCEDURE — 3044F HG A1C LEVEL LT 7.0%: CPT | Performed by: PODIATRIST

## 2025-06-18 PROCEDURE — 3078F DIAST BP <80 MM HG: CPT | Performed by: PODIATRIST

## 2025-06-18 PROCEDURE — 99213 OFFICE O/P EST LOW 20 MIN: CPT | Performed by: PODIATRIST

## 2025-06-18 RX ORDER — AMMONIUM LACTATE 12 G/100G
LOTION TOPICAL
Qty: 225 ML | Refills: 0 | Status: SHIPPED | OUTPATIENT
Start: 2025-06-18

## 2025-06-18 RX ORDER — CICLOPIROX 7.7 MG/G
GEL TOPICAL
Qty: 1 EACH | Refills: 1 | Status: SHIPPED | OUTPATIENT
Start: 2025-06-18

## 2025-06-18 RX ORDER — PREGABALIN 200 MG/1
200 CAPSULE ORAL
Qty: 270 CAPSULE | Refills: 0 | Status: SHIPPED | OUTPATIENT
Start: 2025-06-18 | End: 2025-09-16

## 2025-08-11 RX ORDER — DICYCLOMINE HCL 20 MG
TABLET ORAL
Qty: 360 TABLET | Refills: 0 | Status: SHIPPED | OUTPATIENT
Start: 2025-08-11

## (undated) DEVICE — TOWEL,OR,DSP,ST,BLUE,STD,6/PK,12PK/CS: Brand: MEDLINE

## (undated) DEVICE — 6 ML SYRINGE LUER-LOCK TIP: Brand: MONOJECT

## (undated) DEVICE — 12 ML SYRINGE,LUER-LOCK TIP: Brand: MONOJECT

## (undated) DEVICE — BANDAGE ADH W1XL3IN NAT FAB WVN FLX DURABLE N ADH PD SEAL

## (undated) DEVICE — 3 ML SYRINGE LUER-LOCK TIP: Brand: MONOJECT

## (undated) DEVICE — GAUZE,SPONGE,4"X4",12PLY,STERILE,LF,2'S: Brand: MEDLINE

## (undated) DEVICE — NEEDLE HYPO 18GA L1.5IN PNK POLYPR HUB S STL THN WALL FILL

## (undated) DEVICE — Z DISCONTINUED APPLICATOR SURG PREP 0.35OZ 2% CHG 70% ISO ALC W/ HI LT

## (undated) DEVICE — NEEDLE HYPO 25GA L1.5IN BLU POLYPR HUB S STL REG BVL STR

## (undated) DEVICE — 3M™ RED DOT™ MONITORING ELECTRODE WITH FOAM TAPE AND STICKY GEL 2560, 50/BAG, 20/CASE, 72/PLT: Brand: RED DOT™

## (undated) DEVICE — GLOVE ORANGE PI 7 1/2   MSG9075